# Patient Record
Sex: MALE | URBAN - METROPOLITAN AREA
[De-identification: names, ages, dates, MRNs, and addresses within clinical notes are randomized per-mention and may not be internally consistent; named-entity substitution may affect disease eponyms.]

---

## 2017-11-09 ENCOUNTER — OFFICE (OUTPATIENT)
Dept: URBAN - METROPOLITAN AREA CLINIC 66 | Facility: CLINIC | Age: 77
End: 2017-11-09

## 2017-11-09 VITALS — SYSTOLIC BLOOD PRESSURE: 156 MMHG | HEIGHT: 66 IN | DIASTOLIC BLOOD PRESSURE: 92 MMHG | WEIGHT: 138 LBS

## 2017-11-09 DIAGNOSIS — R17 JAUNDICE: ICD-10-CM

## 2017-11-09 DIAGNOSIS — K80.10 CALCULUS OF GALLBLADDER WITH CHOLECYSTITIS: ICD-10-CM

## 2017-11-09 DIAGNOSIS — K83.1 MIRIZZI'S SYNDROME: ICD-10-CM

## 2017-11-09 PROCEDURE — 99204 OFFICE O/P NEW MOD 45 MIN: CPT | Performed by: INTERNAL MEDICINE

## 2017-11-09 NOTE — SERVICEHPINOTES
patient was seen in hospital with abdominal pain, cholecystitis jaundice due Mirrizi syndrome. S/p ERCP with stent placement and bilirubin came down from 5.5. Now no more symptoms.  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 One 10 Fr by 9 cm plastic stent with a single external flap and a single internal flap was placed into the common bile duct into the common hepatic duct. Bile flowed through the stent. The stent was in good position.s/p lap cholecystectomy 10/27/17 per Dr. villavicencio.BRDIAGNOSIS:BRGALLBLADDER, LAPAROSCOPIC CHOLECYSTECTOMY:BR -     ACUTE AND CHRONIC CHOLECYSTITIS WITH MUCOSALBR     ULCERATION AND NECROSIS.BR-     CHOLELITHIASIS.

## 2017-12-09 ENCOUNTER — HOSPITAL ENCOUNTER (INPATIENT)
Facility: HOSPITAL | Age: 77
LOS: 1 days | DRG: 871 | End: 2017-12-10
Attending: EMERGENCY MEDICINE | Admitting: FAMILY MEDICINE
Payer: COMMERCIAL

## 2017-12-09 ENCOUNTER — APPOINTMENT (EMERGENCY)
Dept: RADIOLOGY | Facility: HOSPITAL | Age: 77
DRG: 871 | End: 2017-12-09
Payer: COMMERCIAL

## 2017-12-09 DIAGNOSIS — A41.9 SEPSIS (HCC): Primary | ICD-10-CM

## 2017-12-09 DIAGNOSIS — K76.9 HEPATIC LESION: ICD-10-CM

## 2017-12-09 DIAGNOSIS — A41.9 SEVERE SEPSIS (HCC): ICD-10-CM

## 2017-12-09 DIAGNOSIS — K75.0 HEPATIC ABSCESS: ICD-10-CM

## 2017-12-09 DIAGNOSIS — R65.20 SEVERE SEPSIS (HCC): ICD-10-CM

## 2017-12-09 PROCEDURE — 85730 THROMBOPLASTIN TIME PARTIAL: CPT | Performed by: EMERGENCY MEDICINE

## 2017-12-09 PROCEDURE — 85027 COMPLETE CBC AUTOMATED: CPT | Performed by: EMERGENCY MEDICINE

## 2017-12-09 PROCEDURE — 83605 ASSAY OF LACTIC ACID: CPT | Performed by: EMERGENCY MEDICINE

## 2017-12-09 PROCEDURE — 85007 BL SMEAR W/DIFF WBC COUNT: CPT | Performed by: EMERGENCY MEDICINE

## 2017-12-09 PROCEDURE — 85610 PROTHROMBIN TIME: CPT | Performed by: EMERGENCY MEDICINE

## 2017-12-09 PROCEDURE — 84484 ASSAY OF TROPONIN QUANT: CPT | Performed by: EMERGENCY MEDICINE

## 2017-12-09 PROCEDURE — 83735 ASSAY OF MAGNESIUM: CPT | Performed by: EMERGENCY MEDICINE

## 2017-12-09 PROCEDURE — 87040 BLOOD CULTURE FOR BACTERIA: CPT | Performed by: EMERGENCY MEDICINE

## 2017-12-09 PROCEDURE — 71020 HB CHEST X-RAY 2VW FRONTAL&LATL: CPT

## 2017-12-09 PROCEDURE — 80053 COMPREHEN METABOLIC PANEL: CPT | Performed by: EMERGENCY MEDICINE

## 2017-12-09 PROCEDURE — 85379 FIBRIN DEGRADATION QUANT: CPT | Performed by: EMERGENCY MEDICINE

## 2017-12-09 PROCEDURE — 93005 ELECTROCARDIOGRAM TRACING: CPT | Performed by: EMERGENCY MEDICINE

## 2017-12-09 PROCEDURE — 83690 ASSAY OF LIPASE: CPT | Performed by: EMERGENCY MEDICINE

## 2017-12-09 PROCEDURE — 36415 COLL VENOUS BLD VENIPUNCTURE: CPT | Performed by: EMERGENCY MEDICINE

## 2017-12-09 RX ORDER — 0.9 % SODIUM CHLORIDE 0.9 %
3 VIAL (ML) INJECTION AS NEEDED
Status: DISCONTINUED | OUTPATIENT
Start: 2017-12-09 | End: 2017-12-10 | Stop reason: HOSPADM

## 2017-12-10 ENCOUNTER — APPOINTMENT (INPATIENT)
Dept: MRI IMAGING | Facility: HOSPITAL | Age: 77
DRG: 871 | End: 2017-12-10
Payer: COMMERCIAL

## 2017-12-10 ENCOUNTER — APPOINTMENT (EMERGENCY)
Dept: CT IMAGING | Facility: HOSPITAL | Age: 77
DRG: 871 | End: 2017-12-10
Payer: COMMERCIAL

## 2017-12-10 ENCOUNTER — HOSPITAL ENCOUNTER (INPATIENT)
Facility: HOSPITAL | Age: 77
LOS: 12 days | Discharge: HOME | DRG: 919 | End: 2017-12-22
Attending: ANESTHESIOLOGY | Admitting: ANESTHESIOLOGY
Payer: COMMERCIAL

## 2017-12-10 ENCOUNTER — APPOINTMENT (INPATIENT)
Dept: RADIOLOGY | Facility: HOSPITAL | Age: 77
DRG: 919 | End: 2017-12-10
Payer: COMMERCIAL

## 2017-12-10 VITALS
WEIGHT: 147.71 LBS | HEART RATE: 126 BPM | OXYGEN SATURATION: 91 % | BODY MASS INDEX: 23.18 KG/M2 | DIASTOLIC BLOOD PRESSURE: 50 MMHG | HEIGHT: 67 IN | TEMPERATURE: 101.8 F | SYSTOLIC BLOOD PRESSURE: 105 MMHG | RESPIRATION RATE: 18 BRPM

## 2017-12-10 DIAGNOSIS — K80.21 CHOLELITHIASIS WITH BILIARY OBSTRUCTION: ICD-10-CM

## 2017-12-10 DIAGNOSIS — A41.9 SEVERE SEPSIS (HCC): ICD-10-CM

## 2017-12-10 DIAGNOSIS — E11.9 DM (DIABETES MELLITUS) (HCC): Primary | Chronic | ICD-10-CM

## 2017-12-10 DIAGNOSIS — E63.9 POOR NUTRITION: ICD-10-CM

## 2017-12-10 DIAGNOSIS — R13.10 DYSPHAGIA: ICD-10-CM

## 2017-12-10 DIAGNOSIS — R65.20 SEVERE SEPSIS (HCC): ICD-10-CM

## 2017-12-10 DIAGNOSIS — K76.9 HEPATIC LESION: ICD-10-CM

## 2017-12-10 PROBLEM — D72.825 BANDEMIA: Status: ACTIVE | Noted: 2017-12-10

## 2017-12-10 PROBLEM — D64.9 ANEMIA: Status: ACTIVE | Noted: 2017-12-10

## 2017-12-10 PROBLEM — N40.0 BPH (BENIGN PROSTATIC HYPERPLASIA): Status: ACTIVE | Noted: 2017-12-10

## 2017-12-10 PROBLEM — I10 HTN (HYPERTENSION): Chronic | Status: ACTIVE | Noted: 2017-12-10

## 2017-12-10 PROBLEM — N17.9 AKI (ACUTE KIDNEY INJURY) (HCC): Status: ACTIVE | Noted: 2017-12-10

## 2017-12-10 PROBLEM — E11.65 TYPE 2 DIABETES MELLITUS WITH HYPERGLYCEMIA (HCC): Status: ACTIVE | Noted: 2017-12-10

## 2017-12-10 PROBLEM — E03.9 HYPOTHYROIDISM: Status: ACTIVE | Noted: 2017-12-10

## 2017-12-10 PROBLEM — K65.1: Status: ACTIVE | Noted: 2017-12-10

## 2017-12-10 PROBLEM — E87.2 LACTIC ACID ACIDOSIS: Status: ACTIVE | Noted: 2017-12-10

## 2017-12-10 LAB
ABO GROUP BLD: NORMAL
ALBUMIN SERPL BCP-MCNC: 3 G/DL (ref 3.5–5)
ALP SERPL-CCNC: 72 U/L (ref 46–116)
ALT SERPL W P-5'-P-CCNC: 73 U/L (ref 12–78)
ANION GAP SERPL CALCULATED.3IONS-SCNC: 11 MMOL/L (ref 4–13)
ANION GAP SERPL CALCULATED.3IONS-SCNC: 8 MMOL/L (ref 4–13)
APTT PPP: 41 SECONDS (ref 23–35)
AST SERPL W P-5'-P-CCNC: 64 U/L (ref 5–45)
ATRIAL RATE: 103 BPM
BACTERIA UR QL AUTO: ABNORMAL /HPF
BASE EX.OXY STD BLDV CALC-SCNC: 90.5 % (ref 60–80)
BASE EXCESS BLDV CALC-SCNC: -1.2 MMOL/L
BASOPHILS # BLD AUTO: 0.01 THOUSANDS/ΜL (ref 0–0.1)
BASOPHILS # BLD MANUAL: 0 THOUSAND/UL (ref 0–0.1)
BASOPHILS NFR BLD AUTO: 0 % (ref 0–1)
BASOPHILS NFR MAR MANUAL: 0 % (ref 0–1)
BILIRUB SERPL-MCNC: 1.2 MG/DL (ref 0.2–1)
BILIRUB UR QL STRIP: NEGATIVE
BLD GP AB SCN SERPL QL: NEGATIVE
BUN SERPL-MCNC: 23 MG/DL (ref 5–25)
BUN SERPL-MCNC: 26 MG/DL (ref 5–25)
CALCIUM SERPL-MCNC: 7.8 MG/DL (ref 8.3–10.1)
CALCIUM SERPL-MCNC: 8.3 MG/DL (ref 8.3–10.1)
CHLORIDE SERPL-SCNC: 101 MMOL/L (ref 100–108)
CHLORIDE SERPL-SCNC: 99 MMOL/L (ref 100–108)
CLARITY UR: CLEAR
CO2 SERPL-SCNC: 24 MMOL/L (ref 21–32)
CO2 SERPL-SCNC: 25 MMOL/L (ref 21–32)
COLOR UR: YELLOW
CREAT SERPL-MCNC: 1.29 MG/DL (ref 0.6–1.3)
CREAT SERPL-MCNC: 1.45 MG/DL (ref 0.6–1.3)
DEPRECATED D DIMER PPP: 1440 NG/ML (FEU) (ref 0–424)
EOSINOPHIL # BLD AUTO: 0.01 THOUSAND/ΜL (ref 0–0.61)
EOSINOPHIL # BLD MANUAL: 0 THOUSAND/UL (ref 0–0.4)
EOSINOPHIL NFR BLD AUTO: 0 % (ref 0–6)
EOSINOPHIL NFR BLD MANUAL: 0 % (ref 0–6)
ERYTHROCYTE [DISTWIDTH] IN BLOOD BY AUTOMATED COUNT: 14.2 % (ref 11.6–15.1)
ERYTHROCYTE [DISTWIDTH] IN BLOOD BY AUTOMATED COUNT: 14.3 % (ref 11.6–15.1)
ERYTHROCYTE [DISTWIDTH] IN BLOOD BY AUTOMATED COUNT: 14.5 % (ref 11.6–15.1)
GFR SERPL CREATININE-BSD FRML MDRD: 46 ML/MIN/1.73SQ M
GFR SERPL CREATININE-BSD FRML MDRD: 53 ML/MIN/1.73SQ M
GLUCOSE SERPL-MCNC: 106 MG/DL (ref 65–140)
GLUCOSE SERPL-MCNC: 146 MG/DL (ref 65–140)
GLUCOSE SERPL-MCNC: 157 MG/DL (ref 65–140)
GLUCOSE SERPL-MCNC: 168 MG/DL (ref 65–140)
GLUCOSE SERPL-MCNC: 89 MG/DL (ref 65–140)
GLUCOSE UR STRIP-MCNC: NEGATIVE MG/DL
HCO3 BLDV-SCNC: 23.2 MMOL/L (ref 24–30)
HCT VFR BLD AUTO: 30.3 % (ref 36.5–49.3)
HCT VFR BLD AUTO: 31 % (ref 36.5–49.3)
HCT VFR BLD AUTO: 34 % (ref 36.5–49.3)
HGB BLD-MCNC: 10 G/DL (ref 12–17)
HGB BLD-MCNC: 10.1 G/DL (ref 12–17)
HGB BLD-MCNC: 11 G/DL (ref 12–17)
HGB UR QL STRIP.AUTO: ABNORMAL
INR PPP: 1.34 (ref 0.86–1.16)
KETONES UR STRIP-MCNC: NEGATIVE MG/DL
LACTATE SERPL-SCNC: 1 MMOL/L (ref 0.5–2)
LACTATE SERPL-SCNC: 1.8 MMOL/L (ref 0.5–2)
LACTATE SERPL-SCNC: 2.3 MMOL/L (ref 0.5–2)
LACTATE SERPL-SCNC: 4.4 MMOL/L (ref 0.5–2)
LEUKOCYTE ESTERASE UR QL STRIP: NEGATIVE
LIPASE SERPL-CCNC: 98 U/L (ref 73–393)
LYMPHOCYTES # BLD AUTO: 0.45 THOUSAND/UL (ref 0.6–4.47)
LYMPHOCYTES # BLD AUTO: 1.29 THOUSANDS/ΜL (ref 0.6–4.47)
LYMPHOCYTES # BLD AUTO: 2 % (ref 14–44)
LYMPHOCYTES NFR BLD AUTO: 6 % (ref 14–44)
MAGNESIUM SERPL-MCNC: 1.5 MG/DL (ref 1.6–2.6)
MCH RBC QN AUTO: 26.7 PG (ref 26.8–34.3)
MCH RBC QN AUTO: 27 PG (ref 26.8–34.3)
MCH RBC QN AUTO: 27.2 PG (ref 26.8–34.3)
MCHC RBC AUTO-ENTMCNC: 32.4 G/DL (ref 31.4–37.4)
MCHC RBC AUTO-ENTMCNC: 32.6 G/DL (ref 31.4–37.4)
MCHC RBC AUTO-ENTMCNC: 33 G/DL (ref 31.4–37.4)
MCV RBC AUTO: 82 FL (ref 82–98)
MCV RBC AUTO: 83 FL (ref 82–98)
MCV RBC AUTO: 83 FL (ref 82–98)
MONOCYTES # BLD AUTO: 1.05 THOUSAND/ΜL (ref 0.17–1.22)
MONOCYTES # BLD AUTO: 1.58 THOUSAND/UL (ref 0–1.22)
MONOCYTES NFR BLD AUTO: 5 % (ref 4–12)
MONOCYTES NFR BLD: 7 % (ref 4–12)
NEUTROPHILS # BLD AUTO: 18.51 THOUSANDS/ΜL (ref 1.85–7.62)
NEUTROPHILS # BLD MANUAL: 20.36 THOUSAND/UL (ref 1.85–7.62)
NEUTS BAND NFR BLD MANUAL: 9 % (ref 0–8)
NEUTS SEG NFR BLD AUTO: 81 % (ref 43–75)
NEUTS SEG NFR BLD AUTO: 89 % (ref 43–75)
NITRITE UR QL STRIP: NEGATIVE
NON-SQ EPI CELLS URNS QL MICRO: ABNORMAL /HPF
NRBC BLD AUTO-RTO: 0 /100 WBCS
NRBC BLD AUTO-RTO: 0 /100 WBCS
O2 CT BLDV-SCNC: 13.9 ML/DL
P AXIS: 55 DEGREES
PCO2 BLDV: 37.3 MM HG (ref 42–50)
PH BLDV: 7.41 [PH] (ref 7.3–7.4)
PH UR STRIP.AUTO: 5.5 [PH] (ref 4.5–8)
PLATELET # BLD AUTO: 135 THOUSANDS/UL (ref 149–390)
PLATELET # BLD AUTO: 164 THOUSANDS/UL (ref 149–390)
PLATELET # BLD AUTO: 169 THOUSANDS/UL (ref 149–390)
PLATELET BLD QL SMEAR: ADEQUATE
PMV BLD AUTO: 11.1 FL (ref 8.9–12.7)
PMV BLD AUTO: 11.2 FL (ref 8.9–12.7)
PMV BLD AUTO: 11.8 FL (ref 8.9–12.7)
PO2 BLDV: 60.4 MM HG (ref 35–45)
POTASSIUM SERPL-SCNC: 3.5 MMOL/L (ref 3.5–5.3)
POTASSIUM SERPL-SCNC: 3.7 MMOL/L (ref 3.5–5.3)
PR INTERVAL: 128 MS
PROT SERPL-MCNC: 6.6 G/DL (ref 6.4–8.2)
PROT UR STRIP-MCNC: ABNORMAL MG/DL
PROTHROMBIN TIME: 17 SECONDS (ref 12.1–14.4)
QRS AXIS: 68 DEGREES
QRSD INTERVAL: 112 MS
QT INTERVAL: 374 MS
QTC INTERVAL: 489 MS
RBC # BLD AUTO: 3.68 MILLION/UL (ref 3.88–5.62)
RBC # BLD AUTO: 3.74 MILLION/UL (ref 3.88–5.62)
RBC # BLD AUTO: 4.12 MILLION/UL (ref 3.88–5.62)
RBC #/AREA URNS AUTO: ABNORMAL /HPF
RH BLD: POSITIVE
SODIUM SERPL-SCNC: 134 MMOL/L (ref 136–145)
SODIUM SERPL-SCNC: 134 MMOL/L (ref 136–145)
SP GR UR STRIP.AUTO: 1.01 (ref 1–1.03)
SPECIMEN EXPIRATION DATE: NORMAL
T WAVE AXIS: 102 DEGREES
TOTAL CELLS COUNTED SPEC: 100
TROPONIN I SERPL-MCNC: <0.02 NG/ML
UROBILINOGEN UR QL STRIP.AUTO: 0.2 E.U./DL
VARIANT LYMPHS # BLD AUTO: 1 %
VENTRICULAR RATE: 103 BPM
WBC # BLD AUTO: 20.93 THOUSAND/UL (ref 4.31–10.16)
WBC # BLD AUTO: 21.27 THOUSAND/UL (ref 4.31–10.16)
WBC # BLD AUTO: 22.62 THOUSAND/UL (ref 4.31–10.16)
WBC #/AREA URNS AUTO: ABNORMAL /HPF

## 2017-12-10 PROCEDURE — 86901 BLOOD TYPING SEROLOGIC RH(D): CPT | Performed by: EMERGENCY MEDICINE

## 2017-12-10 PROCEDURE — 83605 ASSAY OF LACTIC ACID: CPT | Performed by: PHYSICIAN ASSISTANT

## 2017-12-10 PROCEDURE — 77012 CT SCAN FOR NEEDLE BIOPSY: CPT

## 2017-12-10 PROCEDURE — 80048 BASIC METABOLIC PNL TOTAL CA: CPT | Performed by: EMERGENCY MEDICINE

## 2017-12-10 PROCEDURE — C1769 GUIDE WIRE: HCPCS

## 2017-12-10 PROCEDURE — 36415 COLL VENOUS BLD VENIPUNCTURE: CPT | Performed by: EMERGENCY MEDICINE

## 2017-12-10 PROCEDURE — 99152 MOD SED SAME PHYS/QHP 5/>YRS: CPT

## 2017-12-10 PROCEDURE — 99285 EMERGENCY DEPT VISIT HI MDM: CPT

## 2017-12-10 PROCEDURE — 86850 RBC ANTIBODY SCREEN: CPT | Performed by: EMERGENCY MEDICINE

## 2017-12-10 PROCEDURE — 86900 BLOOD TYPING SEROLOGIC ABO: CPT | Performed by: EMERGENCY MEDICINE

## 2017-12-10 PROCEDURE — 87205 SMEAR GRAM STAIN: CPT | Performed by: ANESTHESIOLOGY

## 2017-12-10 PROCEDURE — 81001 URINALYSIS AUTO W/SCOPE: CPT | Performed by: EMERGENCY MEDICINE

## 2017-12-10 PROCEDURE — 85027 COMPLETE CBC AUTOMATED: CPT | Performed by: PHYSICIAN ASSISTANT

## 2017-12-10 PROCEDURE — 85025 COMPLETE CBC W/AUTO DIFF WBC: CPT | Performed by: EMERGENCY MEDICINE

## 2017-12-10 PROCEDURE — A9585 GADOBUTROL INJECTION: HCPCS | Performed by: INTERNAL MEDICINE

## 2017-12-10 PROCEDURE — 87040 BLOOD CULTURE FOR BACTERIA: CPT | Performed by: INTERNAL MEDICINE

## 2017-12-10 PROCEDURE — 87070 CULTURE OTHR SPECIMN AEROBIC: CPT | Performed by: ANESTHESIOLOGY

## 2017-12-10 PROCEDURE — 82948 REAGENT STRIP/BLOOD GLUCOSE: CPT

## 2017-12-10 PROCEDURE — 96375 TX/PRO/DX INJ NEW DRUG ADDON: CPT

## 2017-12-10 PROCEDURE — 82805 BLOOD GASES W/O2 SATURATION: CPT | Performed by: EMERGENCY MEDICINE

## 2017-12-10 PROCEDURE — 96361 HYDRATE IV INFUSION ADD-ON: CPT

## 2017-12-10 PROCEDURE — 83605 ASSAY OF LACTIC ACID: CPT | Performed by: EMERGENCY MEDICINE

## 2017-12-10 PROCEDURE — 88313 SPECIAL STAINS GROUP 2: CPT | Performed by: ANESTHESIOLOGY

## 2017-12-10 PROCEDURE — 80048 BASIC METABOLIC PNL TOTAL CA: CPT | Performed by: PHYSICIAN ASSISTANT

## 2017-12-10 PROCEDURE — 0FB13ZX EXCISION OF RIGHT LOBE LIVER, PERCUTANEOUS APPROACH, DIAGNOSTIC: ICD-10-PCS | Performed by: RADIOLOGY

## 2017-12-10 PROCEDURE — 88307 TISSUE EXAM BY PATHOLOGIST: CPT | Performed by: ANESTHESIOLOGY

## 2017-12-10 PROCEDURE — 83605 ASSAY OF LACTIC ACID: CPT | Performed by: INTERNAL MEDICINE

## 2017-12-10 PROCEDURE — 47000 NEEDLE BIOPSY OF LIVER PERQ: CPT

## 2017-12-10 PROCEDURE — 96365 THER/PROPH/DIAG IV INF INIT: CPT

## 2017-12-10 PROCEDURE — 74183 MRI ABD W/O CNTR FLWD CNTR: CPT

## 2017-12-10 PROCEDURE — 74177 CT ABD & PELVIS W/CONTRAST: CPT

## 2017-12-10 RX ORDER — KETOROLAC TROMETHAMINE 30 MG/ML
30 INJECTION, SOLUTION INTRAMUSCULAR; INTRAVENOUS ONCE
Status: COMPLETED | OUTPATIENT
Start: 2017-12-10 | End: 2017-12-10

## 2017-12-10 RX ORDER — CALCIUM CARBONATE 200(500)MG
1000 TABLET,CHEWABLE ORAL DAILY PRN
Status: DISCONTINUED | OUTPATIENT
Start: 2017-12-10 | End: 2017-12-22 | Stop reason: HOSPADM

## 2017-12-10 RX ORDER — TAMSULOSIN HYDROCHLORIDE 0.4 MG/1
0.4 CAPSULE ORAL
Status: CANCELLED | OUTPATIENT
Start: 2017-12-10

## 2017-12-10 RX ORDER — VANCOMYCIN HYDROCHLORIDE 1 G/200ML
15 INJECTION, SOLUTION INTRAVENOUS ONCE
Status: COMPLETED | OUTPATIENT
Start: 2017-12-10 | End: 2017-12-10

## 2017-12-10 RX ORDER — SODIUM CHLORIDE 9 MG/ML
100 INJECTION, SOLUTION INTRAVENOUS CONTINUOUS
Status: DISCONTINUED | OUTPATIENT
Start: 2017-12-10 | End: 2017-12-11

## 2017-12-10 RX ORDER — LEVOTHYROXINE SODIUM 0.05 MG/1
50 TABLET ORAL
Status: DISCONTINUED | OUTPATIENT
Start: 2017-12-10 | End: 2017-12-10 | Stop reason: HOSPADM

## 2017-12-10 RX ORDER — LEVOTHYROXINE SODIUM 0.05 MG/1
50 TABLET ORAL DAILY
COMMUNITY

## 2017-12-10 RX ORDER — CALCIUM CARBONATE 200(500)MG
1000 TABLET,CHEWABLE ORAL DAILY PRN
Status: CANCELLED | OUTPATIENT
Start: 2017-12-10

## 2017-12-10 RX ORDER — VANCOMYCIN HYDROCHLORIDE 1 G/200ML
15 INJECTION, SOLUTION INTRAVENOUS EVERY 24 HOURS
Status: CANCELLED | OUTPATIENT
Start: 2017-12-10

## 2017-12-10 RX ORDER — SODIUM CHLORIDE 9 MG/ML
100 INJECTION, SOLUTION INTRAVENOUS CONTINUOUS
Status: CANCELLED | OUTPATIENT
Start: 2017-12-10

## 2017-12-10 RX ORDER — HEPARIN SODIUM 5000 [USP'U]/ML
5000 INJECTION, SOLUTION INTRAVENOUS; SUBCUTANEOUS EVERY 8 HOURS SCHEDULED
Status: CANCELLED | OUTPATIENT
Start: 2017-12-10

## 2017-12-10 RX ORDER — DUTASTERIDE AND TAMSULOSIN HYDROCHLORIDE CAPSULES .5; .4 MG/1; MG/1
CAPSULE ORAL
COMMUNITY

## 2017-12-10 RX ORDER — HEPARIN SODIUM 5000 [USP'U]/ML
5000 INJECTION, SOLUTION INTRAVENOUS; SUBCUTANEOUS EVERY 8 HOURS SCHEDULED
Status: DISCONTINUED | OUTPATIENT
Start: 2017-12-10 | End: 2017-12-10

## 2017-12-10 RX ORDER — MIDAZOLAM HYDROCHLORIDE 1 MG/ML
INJECTION INTRAMUSCULAR; INTRAVENOUS CODE/TRAUMA/SEDATION MEDICATION
Status: COMPLETED | OUTPATIENT
Start: 2017-12-10 | End: 2017-12-10

## 2017-12-10 RX ORDER — 0.9 % SODIUM CHLORIDE 0.9 %
3 VIAL (ML) INJECTION AS NEEDED
Status: DISCONTINUED | OUTPATIENT
Start: 2017-12-10 | End: 2017-12-22 | Stop reason: HOSPADM

## 2017-12-10 RX ORDER — CALCIUM CARBONATE 200(500)MG
1000 TABLET,CHEWABLE ORAL DAILY PRN
Status: DISCONTINUED | OUTPATIENT
Start: 2017-12-10 | End: 2017-12-10 | Stop reason: HOSPADM

## 2017-12-10 RX ORDER — VANCOMYCIN HYDROCHLORIDE 1 G/200ML
15 INJECTION, SOLUTION INTRAVENOUS ONCE
Status: CANCELLED | OUTPATIENT
Start: 2017-12-10 | End: 2017-12-10

## 2017-12-10 RX ORDER — ACETAMINOPHEN 325 MG/1
650 TABLET ORAL EVERY 6 HOURS PRN
Status: CANCELLED | OUTPATIENT
Start: 2017-12-10

## 2017-12-10 RX ORDER — ONDANSETRON 2 MG/ML
4 INJECTION INTRAMUSCULAR; INTRAVENOUS EVERY 6 HOURS PRN
Status: DISCONTINUED | OUTPATIENT
Start: 2017-12-10 | End: 2017-12-10 | Stop reason: HOSPADM

## 2017-12-10 RX ORDER — ACETAMINOPHEN 325 MG/1
650 TABLET ORAL EVERY 6 HOURS PRN
Status: DISCONTINUED | OUTPATIENT
Start: 2017-12-10 | End: 2017-12-10 | Stop reason: HOSPADM

## 2017-12-10 RX ORDER — 0.9 % SODIUM CHLORIDE 0.9 %
3 VIAL (ML) INJECTION AS NEEDED
Status: CANCELLED | OUTPATIENT
Start: 2017-12-10

## 2017-12-10 RX ORDER — ONDANSETRON 2 MG/ML
4 INJECTION INTRAMUSCULAR; INTRAVENOUS EVERY 6 HOURS PRN
Status: CANCELLED | OUTPATIENT
Start: 2017-12-10

## 2017-12-10 RX ORDER — ONDANSETRON 2 MG/ML
4 INJECTION INTRAMUSCULAR; INTRAVENOUS EVERY 6 HOURS PRN
Status: DISCONTINUED | OUTPATIENT
Start: 2017-12-10 | End: 2017-12-22 | Stop reason: HOSPADM

## 2017-12-10 RX ORDER — ACETAMINOPHEN 325 MG/1
650 TABLET ORAL ONCE
Status: COMPLETED | OUTPATIENT
Start: 2017-12-10 | End: 2017-12-10

## 2017-12-10 RX ORDER — LEVOTHYROXINE SODIUM 0.05 MG/1
50 TABLET ORAL
Status: CANCELLED | OUTPATIENT
Start: 2017-12-11

## 2017-12-10 RX ORDER — CHLORHEXIDINE GLUCONATE 0.12 MG/ML
15 RINSE ORAL EVERY 12 HOURS SCHEDULED
Status: DISCONTINUED | OUTPATIENT
Start: 2017-12-10 | End: 2017-12-11

## 2017-12-10 RX ORDER — TAMSULOSIN HYDROCHLORIDE 0.4 MG/1
0.4 CAPSULE ORAL
Status: DISCONTINUED | OUTPATIENT
Start: 2017-12-10 | End: 2017-12-22 | Stop reason: HOSPADM

## 2017-12-10 RX ORDER — SODIUM CHLORIDE 9 MG/ML
100 INJECTION, SOLUTION INTRAVENOUS CONTINUOUS
Status: DISCONTINUED | OUTPATIENT
Start: 2017-12-10 | End: 2017-12-10 | Stop reason: HOSPADM

## 2017-12-10 RX ORDER — TAMSULOSIN HYDROCHLORIDE 0.4 MG/1
0.4 CAPSULE ORAL
Status: DISCONTINUED | OUTPATIENT
Start: 2017-12-10 | End: 2017-12-10 | Stop reason: HOSPADM

## 2017-12-10 RX ORDER — LISINOPRIL 10 MG/1
10 TABLET ORAL 2 TIMES DAILY
COMMUNITY

## 2017-12-10 RX ORDER — VANCOMYCIN HYDROCHLORIDE 1 G/200ML
15 INJECTION, SOLUTION INTRAVENOUS EVERY 12 HOURS
Status: DISCONTINUED | OUTPATIENT
Start: 2017-12-10 | End: 2017-12-10 | Stop reason: DRUGHIGH

## 2017-12-10 RX ORDER — HEPARIN SODIUM 5000 [USP'U]/ML
5000 INJECTION, SOLUTION INTRAVENOUS; SUBCUTANEOUS EVERY 8 HOURS SCHEDULED
Status: DISCONTINUED | OUTPATIENT
Start: 2017-12-10 | End: 2017-12-10 | Stop reason: HOSPADM

## 2017-12-10 RX ORDER — VANCOMYCIN HYDROCHLORIDE 1 G/200ML
15 INJECTION, SOLUTION INTRAVENOUS EVERY 24 HOURS
Status: DISCONTINUED | OUTPATIENT
Start: 2017-12-11 | End: 2017-12-11

## 2017-12-10 RX ORDER — LEVOTHYROXINE SODIUM 0.05 MG/1
50 TABLET ORAL
Status: DISCONTINUED | OUTPATIENT
Start: 2017-12-11 | End: 2017-12-22 | Stop reason: HOSPADM

## 2017-12-10 RX ORDER — VANCOMYCIN HYDROCHLORIDE 1 G/200ML
15 INJECTION, SOLUTION INTRAVENOUS EVERY 24 HOURS
Status: DISCONTINUED | OUTPATIENT
Start: 2017-12-10 | End: 2017-12-10 | Stop reason: HOSPADM

## 2017-12-10 RX ORDER — ACETAMINOPHEN 325 MG/1
650 TABLET ORAL EVERY 6 HOURS PRN
Status: DISCONTINUED | OUTPATIENT
Start: 2017-12-10 | End: 2017-12-22 | Stop reason: HOSPADM

## 2017-12-10 RX ORDER — AMOXICILLIN 250 MG
1 CAPSULE ORAL DAILY PRN
Status: DISCONTINUED | OUTPATIENT
Start: 2017-12-10 | End: 2017-12-22 | Stop reason: HOSPADM

## 2017-12-10 RX ORDER — FENTANYL CITRATE 50 UG/ML
INJECTION, SOLUTION INTRAMUSCULAR; INTRAVENOUS CODE/TRAUMA/SEDATION MEDICATION
Status: COMPLETED | OUTPATIENT
Start: 2017-12-10 | End: 2017-12-10

## 2017-12-10 RX ADMIN — SODIUM CHLORIDE 1000 ML: 0.9 INJECTION, SOLUTION INTRAVENOUS at 00:08

## 2017-12-10 RX ADMIN — SODIUM CHLORIDE 1000 ML: 0.9 INJECTION, SOLUTION INTRAVENOUS at 11:45

## 2017-12-10 RX ADMIN — PHYTONADIONE 10 MG: 10 INJECTION, EMULSION INTRAMUSCULAR; INTRAVENOUS; SUBCUTANEOUS at 17:11

## 2017-12-10 RX ADMIN — HEPARIN SODIUM 5000 UNITS: 5000 INJECTION, SOLUTION INTRAVENOUS; SUBCUTANEOUS at 06:42

## 2017-12-10 RX ADMIN — SODIUM CHLORIDE 100 ML/HR: 0.9 INJECTION, SOLUTION INTRAVENOUS at 17:30

## 2017-12-10 RX ADMIN — MIDAZOLAM 1 MG: 1 INJECTION INTRAMUSCULAR; INTRAVENOUS at 19:44

## 2017-12-10 RX ADMIN — SODIUM CHLORIDE 75 ML/HR: 0.9 INJECTION, SOLUTION INTRAVENOUS at 03:04

## 2017-12-10 RX ADMIN — VANCOMYCIN HYDROCHLORIDE 1000 MG: 1 INJECTION, SOLUTION INTRAVENOUS at 01:45

## 2017-12-10 RX ADMIN — LEVOTHYROXINE SODIUM 50 MCG: 50 TABLET ORAL at 06:42

## 2017-12-10 RX ADMIN — GADOBUTROL 6 ML: 604.72 INJECTION INTRAVENOUS at 11:35

## 2017-12-10 RX ADMIN — SODIUM CHLORIDE 1000 ML: 0.9 INJECTION, SOLUTION INTRAVENOUS at 15:19

## 2017-12-10 RX ADMIN — Medication 3.38 G: at 16:17

## 2017-12-10 RX ADMIN — KETOROLAC TROMETHAMINE 30 MG: 30 INJECTION, SOLUTION INTRAMUSCULAR at 00:34

## 2017-12-10 RX ADMIN — VANCOMYCIN HYDROCHLORIDE 1000 MG: 1 INJECTION, SOLUTION INTRAVENOUS at 18:00

## 2017-12-10 RX ADMIN — IOHEXOL 100 ML: 350 INJECTION, SOLUTION INTRAVENOUS at 00:43

## 2017-12-10 RX ADMIN — CEFTRIAXONE 1000 MG: 1 INJECTION, SOLUTION INTRAVENOUS at 01:04

## 2017-12-10 RX ADMIN — Medication 3.38 G: at 23:34

## 2017-12-10 RX ADMIN — TAMSULOSIN HYDROCHLORIDE 0.4 MG: 0.4 CAPSULE ORAL at 20:58

## 2017-12-10 RX ADMIN — ACETAMINOPHEN 650 MG: 325 TABLET, FILM COATED ORAL at 21:12

## 2017-12-10 RX ADMIN — ACETAMINOPHEN 650 MG: 325 TABLET ORAL at 00:34

## 2017-12-10 RX ADMIN — FENTANYL CITRATE 50 MCG: 50 INJECTION INTRAMUSCULAR; INTRAVENOUS at 19:44

## 2017-12-10 NOTE — PLAN OF CARE
Problem: Potential for Falls  Goal: Patient will remain free of falls  INTERVENTIONS:  - Assess patient frequently for physical needs  -  Identify cognitive and physical deficits and behaviors that affect risk of falls    -  [place] fall precautions as indicated by assessment   - Educate patient/family on patient safety including physical limitations  - Instruct patient to call for assistance with activity based on assessment  - Modify environment to reduce risk of injury  - Consider OT/PT consult to assist with strengthening/mobility   Outcome: Progressing

## 2017-12-10 NOTE — ED PROVIDER NOTES
History  Chief Complaint   Patient presents with    Fever - 9 weeks to 74 years     Per EMS " Family stated he became shaky and felt warm to touch  He was showing confusion  He had gallbladder removed three weeks ago  He has stents in place and is to be remove on the 12/22  " Healthy appearance  Answers questions appropriately     79-year-old male patient, here visiting from [place] states that he started with shakiness and feeling unwell earlier this evening [place]  Three weeks ago, the patient underwent a cholecystectomy for cholecystitis  Since then the patient has been doing well  Today, while with family, he felt that he was very jittery  The patient has no other signs of infection but does have abdominal wall which is very warm to the touch  Patient be evaluated for sepsis  History provided by:  Patient   used: No    Fever - 9 weeks to 74 years   Temp source:  Oral  Severity:  Moderate  Onset quality:  Sudden  Timing:  Constant  Progression:  Worsening  Chronicity:  New  Relieved by:  Nothing  Worsened by:  Nothing  Ineffective treatments:  None tried  Associated symptoms: confusion    Associated symptoms: no chest pain, no chills, no cough, no diarrhea, no dysuria, no headaches, no myalgias, no nausea, no rhinorrhea and no somnolence        Prior to Admission Medications   Prescriptions Last Dose Informant Patient Reported? Taking?    Dutasteride-Tamsulosin HCl 0 5-0 4 MG CAPS 12/9/2017 at Unknown time  Yes Yes   Sig: Take by mouth daily at bedtime   levothyroxine 50 mcg tablet 12/9/2017 at Unknown time  Yes Yes   Sig: Take 50 mcg by mouth daily   lisinopril (ZESTRIL) 10 mg tablet 12/9/2017 at Unknown time  Yes Yes   Sig: Take 10 mg by mouth 2 (two) times a day   metFORMIN (GLUCOPHAGE) 500 mg tablet 12/9/2017 at Unknown time  Yes Yes   Sig: Take 500 mg by mouth daily      Facility-Administered Medications: None       Past Medical History: Diagnosis Date    BPH (benign prostatic hyperplasia)     Diabetes mellitus ([facility] [address] )     Hypertension     Hypothyroidism        Past Surgical History:   Procedure Laterality Date    APPENDECTOMY      CHOLECYSTECTOMY      biliary stent placement       Family History   Problem Relation Age of Onset    Hypertension Mother     Dementia Father     Diabetes Brother      I have reviewed and agree with the history as documented  Social History   Substance Use Topics    Smoking status: Former Smoker     Packs/day: 0 50     Years: 10 00     Types: Cigarettes     Start date: 1965     Quit date: 1975    Smokeless tobacco: Never Used    Alcohol use Yes      Comment: occ        Review of Systems   Constitutional: Positive for fever  Negative for chills  HENT: Negative for rhinorrhea  Respiratory: Negative for cough  Cardiovascular: Negative for chest pain  Gastrointestinal: Negative for diarrhea and nausea  Genitourinary: Negative for dysuria  Musculoskeletal: Negative for myalgias  Neurological: Negative for headaches  Psychiatric/Behavioral: Positive for confusion  All other systems reviewed and are negative  Physical Exam  ED Triage Vitals [12/09/17 2334]   Temperature Pulse Respirations Blood Pressure SpO2   99 2 °F (37 3 °C) (!) 118 20 110/57 98 %      Temp Source Heart Rate Source Patient Position - Orthostatic VS BP Location FiO2 (%)   Oral Monitor Sitting Right arm --      Pain Score       4           Orthostatic Vital Signs  Vitals:    12/10/17 0300 12/10/17 0730 12/10/17 0828 12/10/17 1038   BP: 109/55 100/60 139/68 105/50   Pulse: 81 77 104 (!) 126   Patient Position - Orthostatic VS: Lying Lying Lying Lying       Physical Exam   Constitutional: He is oriented to person, place, and time  He appears well-developed and well-nourished  No distress  HENT:   Head: Normocephalic and atraumatic     Right Ear: External ear normal    Left Ear: External ear normal    Eyes: Conjunctivae and EOM are normal  Right eye exhibits no discharge  Left eye exhibits no discharge  No scleral icterus  Neck: Normal range of motion  Neck supple  No JVD present  No tracheal deviation present  No thyromegaly present  Cardiovascular: Normal rate and regular rhythm  Pulmonary/Chest: Effort normal and breath sounds normal  No stridor  No respiratory distress  He has no wheezes  He has no rales  Abdominal: Soft  Bowel sounds are normal  He exhibits no distension  There is no tenderness  Musculoskeletal: Normal range of motion  He exhibits no edema, tenderness or deformity  Neurological: He is alert and oriented to person, place, and time  No cranial nerve deficit  Coordination normal    Skin: Skin is warm and dry  He is not diaphoretic  Psychiatric: He has a normal mood and affect  His behavior is normal    Nursing note and vitals reviewed  ED Medications  Medications   sodium chloride 0 9 % bolus 1,000 mL (0 mL Intravenous Stopped 12/10/17 0145)   acetaminophen (TYLENOL) tablet 650 mg (650 mg Oral Given 12/10/17 0034)   ketorolac (TORADOL) injection 30 mg (30 mg Intravenous Given 12/10/17 0034)   iohexol (OMNIPAQUE) 350 MG/ML injection (MULTI-DOSE) 100 mL (100 mL Intravenous Given 12/10/17 0043)   vancomycin (VANCOCIN) IVPB (premix) 1,000 mg (0 mg Intravenous Stopped 12/10/17 0250)   sodium chloride 0 9 % bolus 1,000 mL (1,000 mL Intravenous New Bag 12/10/17 1145)   gadobutrol injection (MULTI-DOSE) SOLN 10 mL (6 mL Intravenous Given 12/10/17 1135)       Diagnostic Studies  Results Reviewed     Procedure Component Value Units Date/Time    Blood culture [[id number]] Collected:  12/09/17 2354    Lab Status:  Preliminary result Specimen:  Blood from Arm, Right Updated:  12/12/17 1301     Blood Culture No Growth at 48 hrs  Blood culture [[id number]] Collected:  12/09/17 2354    Lab Status:  Preliminary result Specimen:  Blood from Hand, Right Updated:  12/12/17 1301     Blood Culture No Growth at 48 hrs  Basic metabolic panel [[id number]]  (Abnormal) Collected:  12/10/17 0509    Lab Status:  Final result Specimen:  Blood from Arm, Left Updated:  12/10/17 0556     Sodium 134 (L) mmol/L      Potassium 3 5 mmol/L      Chloride 101 mmol/L      CO2 25 mmol/L      Anion Gap 8 mmol/L      BUN 23 mg/dL      Creatinine 1 29 mg/dL      Glucose 168 (H) mg/dL      Calcium 7 8 (L) mg/dL      eGFR 53 ml/min/1 73sq m     Narrative:         National Kidney Disease Education Program recommendations are as follows:  GFR calculation is accurate only with a steady state creatinine  Chronic Kidney disease less than 60 ml/min/1 73 sq  meters  Kidney failure less than 15 ml/min/1 73 sq  meters  CBC (With Platelets) [[id number]]  (Abnormal) Collected:  12/10/17 0509    Lab Status:  Final result Specimen:  Blood from Arm, Left Updated:  12/10/17 0533     WBC 20 93 (H) Thousand/uL      RBC 3 74 (L) Million/uL      Hemoglobin 10 1 (L) g/dL      Hematocrit 31 0 (L) %      MCV 83 fL      MCH 27 0 pg      MCHC 32 6 g/dL      RDW 14 3 %      Platelets 816 Thousands/uL      MPV 11 8 fL     Lactic acid every 2 hours prn [[id number]]  (Normal) Collected:  12/10/17 0216    Lab Status:  Final result Specimen:  Blood from Arm, Right Updated:  12/10/17 0259     LACTIC ACID 1 0 mmol/L     Narrative:         Result may be elevated if tourniquet was used during collection      Blood gas, venous [[id number]]  (Abnormal) Collected:  12/10/17 0136    Lab Status:  Final result Specimen:  Blood from Arm, Right Updated:  12/10/17 0142     pH, Ozzie 7 411 (H)     pCO2, Ozzie 37 3 (L) mm Hg      pO2, Ozzie 60 4 (H) mm Hg      HCO3, Ozzie 23 2 (L) mmol/L      Base Excess, Ozzie -1 2 mmol/L      O2 Content, Ozzie 13 9 ml/dL      O2 HGB, VENOUS 90 5 (H) %     Urine Microscopic [[id number]]  (Abnormal) Collected:  12/10/17 0103    Lab Status:  Final result Specimen:  Urine from Urine, Clean Catch Updated:  12/10/17 0133     RBC, UA 1-2 (A) /hpf      WBC, UA None Seen /hpf Epithelial Cells Occasional /hpf      Bacteria, UA None Seen /hpf     Urinalysis with culture and sensitivity reflex [[id number]]  (Abnormal) Collected:  12/10/17 0103    Lab Status:  Final result Specimen:  Urine from Urine, Clean Catch Updated:  12/10/17 0123     Color, UA Yellow     Clarity, UA Clear     Specific Gravity, UA 1 010     pH, UA 5 5     Leukocytes, UA Negative     Nitrite, UA Negative     Protein, UA Trace (A) mg/dl      Glucose, UA Negative mg/dl      Ketones, UA Negative mg/dl      Urobilinogen, UA 0 2 E U /dl      Bilirubin, UA Negative     Blood, UA Small (A)    D-dimer, quantitative [[id number]]  (Abnormal) Collected:  12/09/17 2354    Lab Status:  Final result Specimen:  Blood from Arm, Right Updated:  12/10/17 0044     D-Dimer, Quant 1,440 (H) ng/ml (FEU)     CBC and differential [[id number]]  (Abnormal) Collected:  12/09/17 2354    Lab Status:  Final result Specimen:  Blood from Arm, Right Updated:  12/10/17 0037     WBC 22 62 (H) Thousand/uL      RBC 4 12 Million/uL      Hemoglobin 11 0 (L) g/dL      Hematocrit 34 0 (L) %      MCV 83 fL      MCH 26 7 (L) pg      MCHC 32 4 g/dL      RDW 14 2 %      MPV 11 1 fL      Platelets 045 Thousands/uL      nRBC 0 /100 WBCs     Narrative: This is an appended report  These results have been appended to a previously verified report  Lactate Blood [[id number]]  (Abnormal) Collected:  12/09/17 2354    Lab Status:  Final result Specimen:  Blood from Arm, Right Updated:  12/10/17 0032     LACTIC ACID 2 3 (HH) mmol/L     Narrative:         Result may be elevated if tourniquet was used during collection      Troponin I [[id number]]  (Normal) Collected:  12/09/17 2354    Lab Status:  Final result Specimen:  Blood from Arm, Right Updated:  12/10/17 0030     Troponin I <0 02 ng/mL     Narrative:         [employer] Chemistry analyzer 99% cutoff is > 0 04 ng/mL in network labs    o cTnI 99% cutoff is useful only when applied to patients in the clinical setting of myocardial ischemia  o cTnI 99% cutoff should be interpreted in the context of clinical history, ECG findings and possibly cardiac imaging to establish correct diagnosis  o cTnI 99% cutoff may be suggestive but clearly not indicative of a coronary event without the clinical setting of myocardial ischemia  Comprehensive metabolic panel [[id number]]  (Abnormal) Collected:  12/09/17 2354    Lab Status:  Final result Specimen:  Blood from Arm, Right Updated:  12/10/17 3528     Sodium 134 (L) mmol/L      Potassium 3 7 mmol/L      Chloride 99 (L) mmol/L      CO2 24 mmol/L      Anion Gap 11 mmol/L      BUN 26 (H) mg/dL      Creatinine 1 45 (H) mg/dL      Glucose 157 (H) mg/dL      Calcium 8 3 mg/dL      AST 64 (H) U/L      ALT 73 U/L      Alkaline Phosphatase 72 U/L      Total Protein 6 6 g/dL      Albumin 3 0 (L) g/dL      Total Bilirubin 1 20 (H) mg/dL      eGFR 46 ml/min/1 73sq m     Narrative:         National Kidney Disease Education Program recommendations are as follows:  GFR calculation is accurate only with a steady state creatinine  Chronic Kidney disease less than 60 ml/min/1 73 sq  meters  Kidney failure less than 15 ml/min/1 73 sq  meters  Lipase [[id number]]  (Normal) Collected:  12/09/17 2354    Lab Status:  Final result Specimen:  Blood from Arm, Right Updated:  12/10/17 0027     Lipase 98 u/L     Magnesium [[id number]]  (Abnormal) Collected:  12/09/17 2354    Lab Status:  Final result Specimen:  Blood from Arm, Right Updated:  12/10/17 0027     Magnesium 1 5 (L) mg/dL     Protime-INR [[id number]]  (Abnormal) Collected:  12/09/17 2354    Lab Status:  Final result Specimen:  Blood from Arm, Right Updated:  12/10/17 0027     Protime 17 0 (H) seconds      INR 1 34 (H)    APTT [[id number]]  (Abnormal) Collected:  12/09/17 2354    Lab Status:  Final result Specimen:  Blood from Arm, Right Updated:  12/10/17 0027     PTT 41 (H) seconds     Narrative:          Therapeutic Heparin Range = 60-90 seconds MRI abdomen w wo contrast   Final Result by Brii Scruggs DO (12/10 1313)      4 5 cm right hepatic lobe mass is indeterminate and restricts diffusion  Although abscess remains in the differential diagnosis, its imaging appearance slightly favor a solid mass  Needle sampling is necessary for further differentiation and therefore    recommended  1 7 cm posterior right hepatic lobe lesion is difficult to characterize given its small size although has imaging features favoring cavernous hemangioma  I would recommend follow-up MRI in 3 months to ensure stability of this finding  1 7 x 1 2 cm indeterminate left adrenal nodule  This does not appear to represent a lipid rich adenoma although could represent a lipid poor adenoma  This can be reevaluated for stability at time of follow-up above  I personally discussed this study and recommendations with Dr Margarita De La Torre on 12/10/2017 1:06 PM          Workstation performed: [id number]         XR chest 2 views   Final Result by Georgiana Jonas MD (12/10 1024)      No active pulmonary disease  Workstation performed: [id number]         CT abdomen pelvis with contrast   Final Result by Julieth Gan DO (12/10 7793)   1  Enhancing hepatic lesions as described above  Differential would include atypical hemangioma, hepatic neoplasm, either primary or metastatic or hepatic abscess, given the history of fever and recent gallbladder surgery  It is recommended the    patient have a follow-up MRI of the liver with gadolinium, for further evaluation  2   Prostatomegaly with probable bladder outlet obstruction  Correlation with serum PSA  3   Colonic diverticulosis        Findings are consistent with the preliminary report from [facility] which was provided shortly after completion of the exam                       Workstation performed: [id number]                    Procedures  Procedures       Phone Contacts  ED Phone Contact    ED Course  ED Course                                MDM  Number of Diagnoses or Management Options  Hepatic abscess: new and requires workup     Amount and/or Complexity of Data Reviewed  Clinical lab tests: ordered and reviewed  Tests in the radiology section of CPT®: ordered and reviewed  Decide to obtain previous medical records or to obtain history from someone other than the patient: yes  Review and summarize past medical records: yes    Patient Progress  Patient progress: stable    The patient presented with a condition in which there was a high probability of imminent or life-threatening deterioration, and critical care services (excluding separately billable procedures) totalled 30-74 minutes  Disposition  Final diagnoses:   Hepatic abscess     Time reflects when diagnosis was documented in both MDM as applicable and the Disposition within this note     Time User Action Codes Description Comment    12/10/2017  1:45 AM Morris Srivastava Add [A41 9] Sepsis ([facility] [address] )     12/10/2017  1:45 AM Costella India [A41 9] Sepsis ([facility] [address] )     12/10/2017  1:45 AM Costella India [A41 9] Sepsis ([facility] [address] )     12/10/2017  1:45 AM Morris Srivastava Add [K76 9] Hepatic lesion     12/10/2017 11:24 AM Lit KHAN Add [A41 9,  R65 20] Severe sepsis ([facility] [address] )     12/12/2017  2:43 PM Radha Clark Add [K75 0] Hepatic abscess       ED Disposition     ED Disposition Condition Comment    Admit  Case was discussed with Elizabeth Bolden and the patient's admission status was agreed to be Admission Status: inpatient status to the service of Dr Rocky Baer           Follow-up Information    None       Discharge Medication List as of 12/10/2017  2:07 PM      CONTINUE these medications which have NOT CHANGED    Details   Dutasteride-Tamsulosin HCl 0 5-0 4 MG CAPS Take by mouth daily at bedtime, Historical Med      levothyroxine 50 mcg tablet Take 50 mcg by mouth daily, Historical Med      lisinopril (ZESTRIL) 10 mg tablet Take 10 mg by mouth 2 (two) times a day, Historical Med      metFORMIN (GLUCOPHAGE) 500 mg tablet Take 500 mg by mouth daily, Historical Med           No discharge procedures on file      ED Provider  Electronically Signed by           Leopoldo Hymen, DO  12/12/17 100 [facility], DO  12/12/17 3004

## 2017-12-10 NOTE — SOCIAL WORK
Cm was informed patient being transferred to [facility] for a higher level of care and would need insurance auth for transport  Cm contacted accepting transport [address] who stated they are transporting the patient auth pending as they are aware patients insurance company is closed over the weekend and patient is in need of treatment at a higher level of care  Cm contacted patient insurance this day and confirmed company is closed for the weekend  Cm team will pursue insurance auth next business day

## 2017-12-10 NOTE — DISCHARGE SUMMARY
Discharge Summary - [facility]    Patient Information: Jc Cooper 65 y o  male MRN: [id number]  Unit/Bed#: -01 Encounter: [id number]    Discharging Physician / Practitioner: Cait Jarquin MD  PCP: No primary care provider on file  Admission Date: 12/9/2017  Discharge Date: 12/10/17    Reason for Admission: altered mental status, fever    Discharge Diagnoses:     Principal Problem:  ·   Severe sepsis [facility]): patient with increasing lactic acid this am and rigor  CT shows possible liver abcess versus hemangioma  Patient being sent for MRI stat  Inpatient sepsis protocol initiated  I spoke with ID will change Cefepime to zosyn and keep Vanco for now  Patient has a retained common bile duct stent that he was to have removed on return to [place]  Patient being transferred to MICU in [facility] for closer monitoring and possible drainage if this is an abcess  Active Problems:  ·   DM (diabetes mellitus) ([facility] [address] ) holding metformin for now use sliding scale insulin  ·   HTN (hypertension):  Blood pressure on the low side giving IV fluid hydration and holding ACE-inhibitor  ·   ALDEN [facility]  Holding ACE-inhibitor and giving fluid challenge will monitor eyes and nose to assure patient is voiding as he does have history of BPH with urinary retention  ·   Hypothyroidism:  Will check TSH continue Synthroid        Consultations During Hospital Stay:  · I canceled the GI consult here at [facility] is the patient will be transferring to [facility]  Medical ICU can decide on gastroenterology consultation versus IR versus surgery versus all the above  · I have consulted Infectious Disease at [facility] in communicated with an via phone  We have adjusted his antibiotic therapy  Procedures Performed:   · CT scan of the abdomen shows enhancing hepatic lesions 14 mm right lobe of liver    There is also a 3 6 x 3 4 heterogeneous enhancing low density lesion in the dome of the right lobe  There is no intra hepatic biliary duct dilatation  Stent is presently common bile duct extending to the common hepatic and right hepatic ducts  Stomach was distended with recently  us did food  Patient has hiatal hernia no obstruction is noted patient has moderate feces in the colon compatible with constipation  No diverticulitis is noted but diverticular are noted  Appendix and gallbladder are absent  Prostatomegaly is present with possible outlet obstruction  Significant Findings / Test Results:     · Possible liver abscess  · Discharging creatinine improved to 1   29 down from 1 45  · White count initially 22 62 down to 20 93  · Hemoglobin 10 1  · Lactic acid trending from 2 3-1 0, to 4 4 this a m     Incidental Findings:   · Renal cysts no further workup3    Test Results Pending at Discharge (will require follow up): · Blood cultures are pending x2 sets 1 last night and 1 from this morning     Outpatient Tests Requested:  · None    Complications:  None    Hospital Course:     Seng Montez is a 65 y o  male patient who originally presented to the hospital on 2017 due to altered mental status and fever  Patient evidently had a cholecystectomy with complications requiring placement of common bile duct stent back in   He was to have the stent removed on 2017  Evidently the cholecystectomy was emergent  The patient came to [place] for a wedding and while here started to feel shaky and have fever  He started to take Tylenol but did not have improvement in the fever  His wife states he was actually confused  He came to the emergency room at the urging guests who were physicians at the wedding  He was found to have sepsis related to a possible liver abscess noted on CT scan  This morning the patient developed poor eager stat lactic acid was sent showing worsening condition  I communicated with Infectious Disease and adjusted his antibiotics    I initiated severe sepsis protocol initiated fluid boluses  We reviewed the case with ICU  Patient will need to be transferred to [place] for further care due to no ICU beds and the likelihood of needing drainage through an interventional radiologist which would not be available here today  At the time of transfer the patient is awake alert and oriented rigors have continue discontinued  We are obtain MRI to help guide therapy  I have reviewed the case with Nephrology to assure that it would be safe to give him gadolinium at this time his renal function is reasonable  We will need to do a he retention protocol on him however as it does appear that he has prostatomegaly and may have urinary output put issues  Condition at Discharge: fair     Discharge Day Visit / Exam:     Subjective:  Patient found this morning to be shaking chills  He is awake alert and very pleasant denies any pain but is having some nausea    Vitals: Blood Pressure: 105/50 (12/10/17 1038)  Pulse: (!) 126 (RN aware) (12/10/17 1038)  Temperature: (!) 101 8 °F (38 8 °C) (12/10/17 1038)  Temp Source: Axillary (12/10/17 1038)  Respirations: 18 (12/10/17 1038)  Height: 5' 7" (170 2 cm) (12/10/17 0300)  Weight - Scale: 67 kg (147 lb 11 3 oz) (12/10/17 0300)  SpO2: 91 % (12/10/17 1038)  Exam:   Physical Exam    General:  Patient is awake alert and oriented cranial nerves 2-12 appear to be intact  Pupils equal round and reactive to light extraocular muscles intact mucous membranes are moist neck is supple there is no JVD no lymphadenopathy no oral lesions  Chest is decreased but clear to auscultation I do not appreciate any rhonchi rales or wheezes  Cardiovascular tachycardic positive S1 and S2 no S3-S4 murmur or gallop  Abdomen soft no guarding no rebound positive bowel sounds  Extremities no clubbing cyanosis edema skin is warm  Neurologically patient is awake alert oriented cranial nerves 2-12 are intact  Psychiatrically affect is appropriate    Discharge instructions/Information to patient and family:   See after visit summary for information provided to patient and family  Provisions for Follow-Up Care:  See after visit summary for information related to follow-up care and any pertinent home health orders  Disposition:     [address] Transfer to [facility] [address] to [facility] SNF:   · Not Applicable to this Patient - Not Applicable to this Patient    Planned Readmission:  Readmit to [facility]     Discharge Statement:  I spent 60 min minutes discharging the patient  This time was spent on the day of discharge  I had direct contact with the patient on the day of discharge  Greater than 50% of the total time was spent examining patient, answering all patient questions, arranging and discussing plan of care with patient as well as directly providing post-discharge instructions  Additional time then spent on discharge activities  Note critical care time of 60 minutes was spent stabilizing this patient  I performed a physical exam reviewed the chart contacted numerous sub specialist as above discussed the case with intensive care and arrange for transfer to another campus  We initiated a protocol for sepsis severe as above  I reviewed this case with the patient's wife at the bedside and am in the process of calling a family friend who they desire to have updated  Discharge Medications:  See after visit summary for reconciled discharge medications provided to patient and family        ** Please Note: This note has been constructed using a voice recognition system **

## 2017-12-10 NOTE — H&P
History and Physical - Critical Care  Eulogio Hunter 65 y o  male MRN: [id number]  [facility]: [id number]     Reason for Admission / Chief Complaint: sepsis secondary to liver mass/abscess     History of Present Illness:  Florian Barreto is a 65 y o  male w/ hx of recent cholecystectomy w/ biliary stent placement 10/25/17 presents with fevers/chills/rigors that bega two days ago  Patient states he started to feel unwell, and would periodically shake from being febrile  Tried tylenol with no alleviation of symptoms  Patient thought maybe he got sick on the plane ride, as patient is visiting from [place] for a family wedding  Patient this morning felt particularly worse, and was brought to [facility], where patient was found to be septic, with tachycardia, soft pressures, and febrile  IR imaginag found a hepaic mass in right liver lobe, and an abscess could not be ruled out  Transferred to [facility] for further management and IR drainage  On arrival, patient states he feels fine, denies any current nausea/vomiting, mild abdominal pain  Patient states he is to have his stents removed 12/20/17  When asked about why patient had cholecystectomy, patient states he had biliary sludge as well as many gallstones  Patient had stent placed at [facility] in [place]  The patient was unable to recall the GI surgeon's name  His gastroenterologist was Dr Preston Vargas [phone]  Attempted to call the GI physician for further information, and left message for Huong Burrows, Dr Miguel Puri advanced practitioner, but no one was able to be reached for further information  Also called [facility], [phone], but  was unable to transfer us to the surgeon who performed the patient's surgery, since medical records is closed on a Sunday  History obtained from the patient       Past Medical History:  Past Medical History:   Diagnosis Date    BPH (benign prostatic hyperplasia)     Diabetes mellitus ([facility] [address] )     Hypertension     Hypothyroidism         Past Surgical History:  Past Surgical History:   Procedure Laterality Date    APPENDECTOMY      CHOLECYSTECTOMY      biliary stent placement        Past Family History:  Family History   Problem Relation Age of Onset    Hypertension Mother     Dementia Father     Diabetes Brother         Social History:  History   Smoking Status    Former Smoker    Packs/day: 0 50    Years: 10 00    Types: Cigarettes    Start date: 1965    Quit date: 1975   Smokeless Tobacco    Never Used     History   Alcohol Use    Yes     Comment: occ     History   Drug Use No     Marital Status: /Civil Union    Medications:  Current Facility-Administered Medications   Medication Dose Route Frequency    acetaminophen (TYLENOL) tablet 650 mg  650 mg Oral Q6H PRN    calcium carbonate (TUMS) chewable tablet 1,000 mg  1,000 mg Oral Daily PRN    chlorhexidine (PERIDEX) 0 12 % oral rinse 15 mL  15 mL Swish & Spit Q12H [address]    insulin lispro (HumaLOG) 100 units/mL subcutaneous injection 1-5 Units  1-5 Units Subcutaneous TID AC    [START ON 12/11/2017] levothyroxine tablet 50 mcg  50 mcg Oral Early Morning    ondansetron (ZOFRAN) injection 4 mg  4 mg Intravenous Q6H PRN    phytonadione (AQUA-MEPHYTON) 10 mg/mL 10 mg in sodium chloride 0 9 % 50 mL IVPB  10 mg Intravenous Once    piperacillin-tazobactam (ZOSYN) 3 375 g in dextrose 5 % 50 mL IVPB  3 375 g Intravenous Q6H [address]    sodium chloride (PF) 0 9 % injection 3 mL  3 mL Intravenous PRN    sodium chloride 0 9 % bolus 1,000 mL  1,000 mL Intravenous Once    sodium chloride 0 9 % infusion  100 mL/hr Intravenous Continuous    tamsulosin (FLOMAX) capsule 0 4 mg  0 4 mg Oral Daily With Dinner    [START ON 12/11/2017] vancomycin (VANCOCIN) IVPB (premix) 1,000 mg  15 mg/kg Intravenous Q24H    vancomycin (VANCOCIN) IVPB (premix) 1,000 mg  15 mg/kg Intravenous Once     Home medications:  Prior to Admission medications    Medication Sig Start Date End Date Taking? Authorizing Provider   Dutasteride-Tamsulosin HCl 0 5-0 4 MG CAPS Take by mouth daily at bedtime    Historical Provider, MD   levothyroxine 50 mcg tablet Take 50 mcg by mouth daily    Historical Provider, MD   lisinopril (ZESTRIL) 10 mg tablet Take 10 mg by mouth 2 (two) times a day    Historical Provider, MD   metFORMIN (GLUCOPHAGE) 500 mg tablet Take 500 mg by mouth daily    Historical Provider, MD     Allergies: Allergies   Allergen Reactions    Shellfish-Derived Products Anaphylaxis        ROS:   Review of Systems   Constitutional: Positive for chills and fever  Negative for fatigue  HENT: Negative for sore throat  Eyes: Negative for redness and visual disturbance  Respiratory: Negative for shortness of breath  Cardiovascular: Negative for chest pain  Gastrointestinal: Positive for abdominal pain  Negative for blood in stool, constipation, diarrhea and nausea  Endocrine: Negative for polyuria  Genitourinary: Negative for difficulty urinating  Skin: Negative for rash  Neurological: Negative for dizziness, seizures, facial asymmetry, light-headedness, numbness and headaches  Psychiatric/Behavioral: Negative for agitation, confusion and dysphoric mood  Vitals:  Vitals:    12/10/17 1441 12/10/17 1500   BP: 92/51 92/51   Pulse: (!) 112 (!) 106   Resp: (!) 24 (!) 27   Temp: 100 4 °F (38 °C)    TempSrc: Oral    SpO2: 96% 95%   Weight: 65 4 kg (144 lb 2 9 oz)    Height: 5' 7" (1 702 m)      Temperature:   Temp (24hrs), Av 6 °F (37 6 °C), Min:97 4 °F (36 3 °C), Max:103 1 °F (39 5 °C)    Current: Temperature: 100 4 °F (38 °C)     Weights:   IBW: 66 1 kg  Body mass index is 22 58 kg/m²  Physical Exam:  Physical Exam   Constitutional: He is oriented to person, place, and time  He appears well-developed and well-nourished  No distress  HENT:   Head: Normocephalic and atraumatic     Right Ear: External ear normal  Left Ear: External ear normal    Mouth/Throat: Oropharynx is clear and moist    Eyes: Pupils are equal, round, and reactive to light  Neck: Normal range of motion  Cardiovascular: Regular rhythm and normal heart sounds  tachycardic   Pulmonary/Chest: Effort normal  No respiratory distress  He has no wheezes  Abdominal: Soft  There is tenderness  There is no rebound  Mild tenderness to palpation in right lower quadrant with no rebound/gaurding, muñiz sign negative  Musculoskeletal: Normal range of motion  Neurological: He is alert and oriented to person, place, and time  No cranial nerve deficit  Skin: Skin is warm and dry  He is not diaphoretic  No erythema  Psychiatric: He has a normal mood and affect  Vitals reviewed  Labs:    Results from last 7 days  Lab Units 12/10/17  0509 12/09/17  2354   WBC Thousand/uL 20 93* 22 62*   HEMOGLOBIN g/dL 10 1* 11 0*   HEMATOCRIT % 31 0* 34 0*   PLATELETS Thousands/uL 164 169   MONO PCT MAN %  --  7      Results from last 7 days  Lab Units 12/10/17  0509 12/09/17  2354   SODIUM mmol/L 134* 134*   POTASSIUM mmol/L 3 5 3 7   CHLORIDE mmol/L 101 99*   CO2 mmol/L 25 24   BUN mg/dL 23 26*   CREATININE mg/dL 1 29 1 45*   CALCIUM mg/dL 7 8* 8 3   TOTAL PROTEIN g/dL  --  6 6   BILIRUBIN TOTAL mg/dL  --  1 20*   ALK PHOS U/L  --  72   ALT U/L  --  73   AST U/L  --  64*   GLUCOSE RANDOM mg/dL 168* 157*       Results from last 7 days  Lab Units 12/09/17  2354   MAGNESIUM mg/dL 1 5*            Results from last 7 days  Lab Units 12/09/17  2354   INR  1 34*   PTT seconds 41*       Results from last 7 days  Lab Units 12/10/17  1202   LACTIC ACID mmol/L 1 8       0  Lab Value Date/Time   TROPONINI <0 02 12/09/2017 2354        Imaging:  I have personally reviewed pertinent reports  Xr Chest 2 Views  Result Date: 12/10/2017  Impression: No active pulmonary disease       Mri Abdomen W Wo Contrast  Result Date: 12/10/2017  Impression:   -4 5 cm right hepatic lobe mass is indeterminate and restricts diffusion  Although abscess remains in the differential diagnosis, its imaging appearance slightly favor a solid mass  Needle sampling is necessary for further differentiation and therefore recommended    -1 7 cm posterior right hepatic lobe lesion is difficult to characterize given its small size although has imaging features favoring cavernous hemangioma    -1 7 x 1 2 cm indeterminate left adrenal nodule  Ct Abdomen Pelvis With Contrast  Result Date: 12/10/2017  Impression:   1  Enhancing hepatic lesions as described above  Differential would include atypical hemangioma, hepatic neoplasm, either primary or metastatic or hepatic abscess, given the history of fever and recent gallbladder surgery  It is recommended the patient have a follow-up MRI of the liver with gadolinium, for further evaluation  2   Prostatomegaly with probable bladder outlet obstruction  Correlation with serum PSA  3   Colonic diverticulosis  Micro:  No results found for: Glo Stade, SPUTUMCULTUR    Assessment: 65 y o  M presenting w/ sepsis secondary to liver mass/abscess    Plan:     Neuro:  //Analgesia: Tylenol PRN     CV: Goal MAP > 65   //HTN: holding home lisinopril    //Access:    Left peripheral  Right peripheral x2       Pulm: Goal SpO2 > 95  -Incentive spirometry    GI:   //Liver mass/abscess: IR consulted, IR guided drainage pending    -4 5 cm right hepatic lobe mass on MRI  -s/p cholecystectomy 10/25/17  [ ] AM CMP pending     //Bowel Regimen: senna prn    //nausea: zofran PRN     : Trend Is and Os  Trend UOP and BUN/creatinine   //BPH: home tamsulosin    //Indwelling Erwin present: no        F/E/N:     //Fluids: Rate: 100 cc/hr     //Electrolytes: Replete PRN  Goals K >4 0, Mag >2 0, and Phos >3 0    //Nutrition: NPO prior to IR guided drainage  ID: Trend white count and fever curve   //Liver abscess:  - Vancomycin + Zosyn   D/C vanc once if able to post IR procedure  - lactate 1 8, not trending  Heme: Trend H and H   //Elevated PT/INR:  Vit K 10 mg IV given     //DVT ppx: to be resumed 24 hours post IR procedure       Endo:   //DM: holding home metformin, on Insulin Sliding Scale while in hospital      //Hypothyroidism: Home levothyroxine  Msk/Skin: Frequent turning and pressure offloading    //OOB if possible  //PT/OT to see patient when able     Disposition: ICU level care       VTE Pharmacologic Prophylaxis: RX contraindicated due to: going to IR  VTE Mechanical Prophylaxis: sequential compression device     Invasive lines and devices: Invasive Devices     Peripheral Intravenous Line            Peripheral IV 12/09/17 Left Antecubital 1 day    Peripheral IV 12/10/17 Right Antecubital less than 1 day    Peripheral IV 12/10/17 Right Hand less than 1 day                 Code Status: Level 1 - Full Code  POA:    POLST:       Given critical illness, patient length of stay will require greater than two midnights  Counseling / Coordination of Care  Total Critical Care time spent 30 minutes excluding procedures, teaching and family updates  Portions of the record may have been created with voice recognition software  Occasional wrong word or "sound a like" substitutions may have occurred due to the inherent limitations of voice recognition software  Read the chart carefully and recognize, using context, where substitutions have occurred          Sarah Serrano MD

## 2017-12-10 NOTE — H&P
History and Physical - [facility]    Patient Information: Teresa Bolton 65 y o  male MRN: [id number]  Unit/Bed#: ED 32 Encounter: [id number]  Admitting Physician: Yanira Raymond PA-C  PCP: No primary care provider on file  Date of Admission:  12/10/17    Assessment/Plan:    Hospital Problem List:     Principal Problem:    Sepsis ([facility] )  Active Problems:    DM (diabetes mellitus) ([facility] )    HTN (hypertension)    ALDEN (acute kidney injury) ([place] [address] )      Plan for the Primary Problem(s):  · Sepsis  · Admit  · Trend lactic acid  · IV fluids  · IV antibiotics, cefepime and vancomycin  · Liver ultrasound pending  · Consult GI  · Etiology unclear, but suspicious of possible hepatic abscess based on CT findings and recent cholecystectomy with stent placement  Plan for Additional Problems:   · Diabetes - hemoglobin A1c pending, insulin sliding scale  · Hypertension - will monitor, holding lisinopril secondary to acute kidney injury  · Acute kidney injury - IV fluids, recheck creatinine, avoid nephrotoxic drugs    VTE Prophylaxis: Heparin  / sequential compression device   Code Status:  Full  POLST: There is no POLST form on file for this patient (pre-hospital)    Anticipated Length of Stay:  Patient will be admitted on an  inpatient  basis with an anticipated length of stay of  more than 2 midnights  Justification for Hospital Stay:  IV antibiotics    Total Time for Visit, including Counseling / Coordination of Care: 30 minutes  Greater than 50% of this total time spent on direct patient counseling and coordination of care  Chief Complaint:   Fever    History of Present Illness:    Teresa Bolton is a 65 y o  male who presents with complaints of fever and not feeling well that began yesterday  Patient states he has been very shaky  Patient states he has been taking Tylenol but the fever keeps returning  Patient is visiting from [place] for a family wedding    Patient states that while at the wedding today he started to feel worse and was brought to the hospital via EMS  Patient status post cholecystectomy with stent placement on 10/25/17  Patient is scheduled for stent removal on 12/20/2017  Patient states he had an emergent cholecystectomy  Patient states he does have abdominal pain but denies nausea, vomiting, chest pain, shortness of breath  Review of Systems:    Review of Systems   Constitutional: Positive for fever  All other systems reviewed and are negative  Past Medical and Surgical History:     Past Medical History:   Diagnosis Date    Diabetes mellitus ([facility] )     Hypertension        Past Surgical History:   Procedure Laterality Date    APPENDECTOMY      CHOLECYSTECTOMY         Meds/Allergies:    Prior to Admission medications    Medication Sig Start Date End Date Taking? Authorizing Provider   Dutasteride-Tamsulosin HCl 0 5-0 4 MG CAPS Take by mouth daily at bedtime   Yes Historical Provider, MD   levothyroxine 50 mcg tablet Take 50 mcg by mouth daily   Yes Historical Provider, MD   lisinopril (ZESTRIL) 10 mg tablet Take 10 mg by mouth 2 (two) times a day   Yes Historical Provider, MD   metFORMIN (GLUCOPHAGE) 500 mg tablet Take 500 mg by mouth daily   Yes Historical Provider, MD     I have reviewed home medications with patient personally  Allergies: No Known Allergies    Social History:     Marital Status: /Civil Union   Occupation:  Retired  Patient Pre-hospital Living Situation:  Lives at home in [place], patient is visiting for a family wedding  Patient Pre-hospital Level of Mobility:  Ambulatory  Patient Pre-hospital Diet Restrictions:  Diabetic  Substance Use History:   History   Alcohol Use    Yes     Comment: occ     History   Smoking Status    Former Smoker   Smokeless Tobacco    Never Used     History   Drug Use No       Family History:    History reviewed  No pertinent family history      Physical Exam:     Vitals:   Blood Pressure: 107/56 (12/10/17 0106)  Pulse: 101 (12/10/17 0106)  Temperature: (!) 103 1 °F (39 5 °C) (12/10/17 0011)  Temp Source: Rectal (12/10/17 0011)  Respirations: 20 (12/10/17 0106)  Weight - Scale: 66 5 kg (146 lb 9 7 oz) (12/09/17 2334)  SpO2: 96 % (12/10/17 0106)    Physical Exam   Constitutional: He is oriented to person, place, and time  He appears well-developed and well-nourished  He has a sickly appearance  HENT:   Head: Normocephalic and atraumatic  Right Ear: External ear normal    Left Ear: External ear normal    Nose: Nose normal    Mouth/Throat: Oropharynx is clear and moist    Eyes: Conjunctivae and EOM are normal  Pupils are equal, round, and reactive to light  Neck: Normal range of motion  Cardiovascular: Normal rate, regular rhythm and normal heart sounds  Pulmonary/Chest: Effort normal and breath sounds normal    Abdominal: Soft  Bowel sounds are normal  There is tenderness in the right upper quadrant and right lower quadrant  There is no CVA tenderness  Musculoskeletal: Normal range of motion  Neurological: He is alert and oriented to person, place, and time  Skin: Skin is warm and dry  Psychiatric: He has a normal mood and affect  His behavior is normal  Judgment and thought content normal    Vitals reviewed  Additional Data:     Lab Results: I have personally reviewed pertinent reports          Results from last 7 days  Lab Units 12/09/17  2354   WBC Thousand/uL 22 62*   HEMOGLOBIN g/dL 11 0*   HEMATOCRIT % 34 0*   PLATELETS Thousands/uL 169   LYMPHO PCT % 2*   MONO PCT MAN % 7   EOSINO PCT MANUAL % 0       Results from last 7 days  Lab Units 12/09/17  2354   SODIUM mmol/L 134*   POTASSIUM mmol/L 3 7   CHLORIDE mmol/L 99*   CO2 mmol/L 24   BUN mg/dL 26*   CREATININE mg/dL 1 45*   CALCIUM mg/dL 8 3   TOTAL PROTEIN g/dL 6 6   BILIRUBIN TOTAL mg/dL 1 20*   ALK PHOS U/L 72   ALT U/L 73   AST U/L 64*   GLUCOSE RANDOM mg/dL 157*       Results from last 7 days  Lab Units 12/09/17  2354   INR 1 34*       Imaging: I have personally reviewed pertinent reports  CT abdomen - [facility] report - status post cholecystectomy  Hepatic lesion noted, cannot rule out abscess    EKG, Pathology, and Other Studies Reviewed on Admission:   · EKG:     Allscripts / Epic Records Reviewed: Yes     ** Please Note: This note has been constructed using a voice recognition system   **

## 2017-12-10 NOTE — PLAN OF CARE
DISCHARGE PLANNING     Discharge to home or other facility with appropriate resources Progressing        GASTROINTESTINAL - ADULT     Minimal or absence of nausea and/or vomiting Progressing     Maintains or returns to baseline bowel function Progressing     Maintains adequate nutritional intake Progressing        INFECTION - ADULT     Absence or prevention of progression during hospitalization Progressing     Absence of fever/infection during neutropenic period Progressing        Knowledge Deficit     Patient/family/caregiver demonstrates understanding of disease process, treatment plan, medications, and discharge instructions Progressing        METABOLIC, FLUID AND ELECTROLYTES - ADULT     Electrolytes maintained within normal limits Progressing     Fluid balance maintained Progressing     Glucose maintained within target range Progressing        PAIN - ADULT     Verbalizes/displays adequate comfort level or baseline comfort level Progressing        Potential for Falls     Patient will remain free of falls Progressing        Prexisting or High Potential for Compromised Skin Integrity     Skin integrity is maintained or improved Progressing        SAFETY ADULT     Maintain or return to baseline ADL function Progressing     Maintain or return mobility status to optimal level Progressing

## 2017-12-11 LAB
ALBUMIN SERPL BCP-MCNC: 2.1 G/DL (ref 3.5–5)
ALP SERPL-CCNC: 72 U/L (ref 46–116)
ALT SERPL W P-5'-P-CCNC: 155 U/L (ref 12–78)
ANION GAP SERPL CALCULATED.3IONS-SCNC: 8 MMOL/L (ref 4–13)
ANION GAP SERPL CALCULATED.3IONS-SCNC: 9 MMOL/L (ref 4–13)
AST SERPL W P-5'-P-CCNC: 140 U/L (ref 5–45)
BASOPHILS # BLD MANUAL: 0 THOUSAND/UL (ref 0–0.1)
BASOPHILS NFR MAR MANUAL: 0 % (ref 0–1)
BILIRUB SERPL-MCNC: 0.97 MG/DL (ref 0.2–1)
BUN SERPL-MCNC: 26 MG/DL (ref 5–25)
BUN SERPL-MCNC: 27 MG/DL (ref 5–25)
CALCIUM SERPL-MCNC: 6.8 MG/DL (ref 8.3–10.1)
CALCIUM SERPL-MCNC: 7 MG/DL (ref 8.3–10.1)
CHLORIDE SERPL-SCNC: 109 MMOL/L (ref 100–108)
CHLORIDE SERPL-SCNC: 110 MMOL/L (ref 100–108)
CO2 SERPL-SCNC: 20 MMOL/L (ref 21–32)
CO2 SERPL-SCNC: 22 MMOL/L (ref 21–32)
CREAT SERPL-MCNC: 1.11 MG/DL (ref 0.6–1.3)
CREAT SERPL-MCNC: 1.27 MG/DL (ref 0.6–1.3)
EOSINOPHIL # BLD MANUAL: 0 THOUSAND/UL (ref 0–0.4)
EOSINOPHIL NFR BLD MANUAL: 0 % (ref 0–6)
ERYTHROCYTE [DISTWIDTH] IN BLOOD BY AUTOMATED COUNT: 14.5 % (ref 11.6–15.1)
EST. AVERAGE GLUCOSE BLD GHB EST-MCNC: 126 MG/DL
GFR SERPL CREATININE-BSD FRML MDRD: 54 ML/MIN/1.73SQ M
GFR SERPL CREATININE-BSD FRML MDRD: 63 ML/MIN/1.73SQ M
GLUCOSE SERPL-MCNC: 113 MG/DL (ref 65–140)
GLUCOSE SERPL-MCNC: 114 MG/DL (ref 65–140)
GLUCOSE SERPL-MCNC: 140 MG/DL (ref 65–140)
GLUCOSE SERPL-MCNC: 154 MG/DL (ref 65–140)
GLUCOSE SERPL-MCNC: 223 MG/DL (ref 65–140)
GLUCOSE SERPL-MCNC: 94 MG/DL (ref 65–140)
GLUCOSE SERPL-MCNC: 95 MG/DL (ref 65–140)
HBA1C MFR BLD: 6 % (ref 4.2–6.3)
HCT VFR BLD AUTO: 29.7 % (ref 36.5–49.3)
HGB BLD-MCNC: 10 G/DL (ref 12–17)
LACTATE SERPL-SCNC: 1.3 MMOL/L (ref 0.5–2)
LYMPHOCYTES # BLD AUTO: 0.59 THOUSAND/UL (ref 0.6–4.47)
LYMPHOCYTES # BLD AUTO: 3 % (ref 14–44)
MCH RBC QN AUTO: 27 PG (ref 26.8–34.3)
MCHC RBC AUTO-ENTMCNC: 33.7 G/DL (ref 31.4–37.4)
MCV RBC AUTO: 80 FL (ref 82–98)
MONOCYTES # BLD AUTO: 0.79 THOUSAND/UL (ref 0–1.22)
MONOCYTES NFR BLD: 4 % (ref 4–12)
NEUTROPHILS # BLD MANUAL: 18.11 THOUSAND/UL (ref 1.85–7.62)
NEUTS BAND NFR BLD MANUAL: 5 % (ref 0–8)
NEUTS SEG NFR BLD AUTO: 87 % (ref 43–75)
NRBC BLD AUTO-RTO: 0 /100 WBCS
PLATELET # BLD AUTO: 142 THOUSANDS/UL (ref 149–390)
PLATELET BLD QL SMEAR: ABNORMAL
PMV BLD AUTO: 10.8 FL (ref 8.9–12.7)
POTASSIUM SERPL-SCNC: 3.8 MMOL/L (ref 3.5–5.3)
POTASSIUM SERPL-SCNC: 4 MMOL/L (ref 3.5–5.3)
PROT SERPL-MCNC: 5.4 G/DL (ref 6.4–8.2)
RBC # BLD AUTO: 3.7 MILLION/UL (ref 3.88–5.62)
RBC MORPH BLD: NORMAL
SODIUM SERPL-SCNC: 139 MMOL/L (ref 136–145)
SODIUM SERPL-SCNC: 139 MMOL/L (ref 136–145)
VARIANT LYMPHS # BLD AUTO: 1 %
WBC # BLD AUTO: 19.68 THOUSAND/UL (ref 4.31–10.16)

## 2017-12-11 PROCEDURE — 82948 REAGENT STRIP/BLOOD GLUCOSE: CPT

## 2017-12-11 PROCEDURE — 85007 BL SMEAR W/DIFF WBC COUNT: CPT | Performed by: EMERGENCY MEDICINE

## 2017-12-11 PROCEDURE — 80053 COMPREHEN METABOLIC PANEL: CPT | Performed by: EMERGENCY MEDICINE

## 2017-12-11 PROCEDURE — 83036 HEMOGLOBIN GLYCOSYLATED A1C: CPT | Performed by: NURSE PRACTITIONER

## 2017-12-11 PROCEDURE — 85027 COMPLETE CBC AUTOMATED: CPT | Performed by: EMERGENCY MEDICINE

## 2017-12-11 RX ORDER — OXYCODONE HYDROCHLORIDE 5 MG/1
5 TABLET ORAL EVERY 4 HOURS PRN
Status: DISCONTINUED | OUTPATIENT
Start: 2017-12-11 | End: 2017-12-22 | Stop reason: HOSPADM

## 2017-12-11 RX ADMIN — SODIUM CHLORIDE 100 ML/HR: 0.9 INJECTION, SOLUTION INTRAVENOUS at 03:31

## 2017-12-11 RX ADMIN — Medication 3.38 G: at 05:43

## 2017-12-11 RX ADMIN — ACETAMINOPHEN 650 MG: 325 TABLET, FILM COATED ORAL at 03:27

## 2017-12-11 RX ADMIN — INSULIN LISPRO 1 UNITS: 100 INJECTION, SOLUTION INTRAVENOUS; SUBCUTANEOUS at 12:28

## 2017-12-11 RX ADMIN — INSULIN LISPRO 1 UNITS: 100 INJECTION, SOLUTION INTRAVENOUS; SUBCUTANEOUS at 17:54

## 2017-12-11 RX ADMIN — TAMSULOSIN HYDROCHLORIDE 0.4 MG: 0.4 CAPSULE ORAL at 17:48

## 2017-12-11 RX ADMIN — Medication 3.38 G: at 23:11

## 2017-12-11 RX ADMIN — Medication 3.38 G: at 17:48

## 2017-12-11 RX ADMIN — ENOXAPARIN SODIUM 40 MG: 40 INJECTION SUBCUTANEOUS at 12:28

## 2017-12-11 RX ADMIN — OXYCODONE HYDROCHLORIDE 5 MG: 5 TABLET ORAL at 19:36

## 2017-12-11 RX ADMIN — OXYCODONE HYDROCHLORIDE 5 MG: 5 TABLET ORAL at 04:01

## 2017-12-11 RX ADMIN — LEVOTHYROXINE SODIUM 50 MCG: 50 TABLET ORAL at 05:43

## 2017-12-11 RX ADMIN — ACETAMINOPHEN 650 MG: 325 TABLET, FILM COATED ORAL at 14:44

## 2017-12-11 RX ADMIN — Medication 3.38 G: at 12:34

## 2017-12-11 NOTE — PROGRESS NOTES
Progress Note -   ICU Transfer to Step down/med  surg  - Eulogio Hunter 65 y o  male MRN: [id number]    Unit/Bed#: MICU 10 Encounter: [id number]      Code Status: Level 1 - Full Code    Date of ICU admission: 12/10/2017    Reason for ICU adm: Sepsis ([place] [address] ) [A41 9]    Active problems:   Patient Active Problem List   Diagnosis    Severe sepsis ([place] [address] )    Type 2 diabetes mellitus with hyperglycemia ([place] [address] )    HTN (hypertension)    ALDEN (acute kidney injury) ([place] [address] )    Hypothyroidism    BPH (benign prostatic hyperplasia)    Left upper quadrant abdominal abscess ([place] [address] )    Cholelithiasis with biliary obstruction S/P Cholecystectomy 6 weeks ago    Lactic acid acidosis    Anemia    Leukocytosis with bandemia       Summary of clinical course:   Thaddeus Louis is a 65 y o  male w/ hx of recent cholecystectomy w/ biliary stent placement 10/25/17 presents with fevers/chills/rigors that began two days ago  Patient states he started to feel unwell, and would periodically shake from being febrile  Tried tylenol with no alleviation of symptoms  Patient thought maybe he got sick on the plane ride, as patient is visiting from [place] for a family wedding  Patient this morning felt particularly worse, and was brought to [facility], where patient was found to be septic, with tachycardia, soft pressures, and febrile  IR imaginag found a hepatic mass in right liver lobe, and an abscess could not be ruled out  Transferred to [facility] for further management and IR drainage      On arrival, patient stated he feels fine, denied any current nausea/vomiting, mild abdominal pain  Patient states he is to have his stents removed 12/20/17  When asked about why patient had cholecystectomy, patient states he had biliary sludge as well as many gallstones      Patient had stent placed at [facility] in [place]  The patient was unable to recall the GI surgeon's name  His gastroenterologist is Dr Mireille Montenegro [phone]  Attempted to call the GI physician for further information, and left message for Zahida Khan, Dr Ngoc Mijares advanced practitioner, but no one was able to be reached for further information  Also called LIZZETH, [phone] but were unable to contact GI surgeon  Pt returned from IR appearing better  Systolic BP in 684P  IR did not find a hepatic abscess to drain, unable to aspirate purulent material  Tissue biopsies and cultures sent  Post op end points within normal limits  Lactate was not elevated, no Hg drop  Continue to monitor on the floor to ensure patient stable for discharge  Please keep patient's PCP updated, Dr Josefa Jones [phone]  Recent or scheduled procedures: none    Recent diagnostics:   IR guided tissue biopsy of right hepatic lobe    Neuro:  //Analgesia: Tylenol and 5 mg oxy PRN     CV: Goal MAP > 65   - pressures have been stable without need for pressors   //HTN: holding home lisinopril for now    Pulm: Goal SpO2 > 95  -Incentive spirometry     GI:   //Liver mass/abscess:  -4 4 x 4 2 x 4 5 cm right anterior hepatic mass consistent with abscess after cholecystectomy, but MRI findings suggestive of solid mass  1 7 x 1 3 cm posterior right hepatic lobe nodule suggestive of cavernous hemangioma  - IR attempted aspiration of right hepatic lesion, did not yield fluid, suggesting solid mass  [ ] follow up tissue biopsy and tissue culture results    //Bowel Regimen: senna prn    //nausea: zofran PRN     : Trend Is and Os   Trend UOP and BUN/creatinine   //BPH: home tamsulosin     //Indwelling Erwin present: no      F/E/N:      //Fluids: PO intake  //Electrolytes: Replete PRN   //Nutrition: Vegetarian diet     ID: Trend white count and fever curve    //Liver abscess:  Zosyn; transition to PO abx prior to discharge     Heme: Trend H and H   //Elevated PT/INR:  Vit K 10 mg IV given 12/10  //DVT ppx:  lovenox 40 mg daily  Endo:   //DM: holding home metformin, on Insulin Sliding Scale while in hospital    //Hypothyroidism: Home levothyroxine  Msk/Skin: Frequent turning and pressure offloading  //OOB if possible     Hospital discharge planning:  Med surg to ensure patient remains stable prior to discharge    Spoke to Dr Dulce Wilkerson about the transfer

## 2017-12-11 NOTE — PROGRESS NOTES
Post Op Progress note    The Pt returned from IR procedure doing well  The pt was scheduled to have IR percutaneous drainage of presumed hepatic abscesses identified on CT  However intra-procedurally, IR was unable to aspirate any purulent material, and so tissue needle biopsies were taken to evaluate presumably solid masses versus abscesses  The pt tolerated the procedure well under propofol  He returns to the unit awake, alert, in epain at the biopsy site  He has no nausea or vomiting  He has an appetite and is requesting to eat  /56   Pulse 99   Temp 98 8 °F (37 1 °C) (Oral)   Resp 20   Ht 5' 7" (1 702 m)   Wt 65 4 kg (144 lb 2 9 oz)   SpO2 93%   BMI 22 58 kg/m²      Physical Exam   Constitutional: He is oriented to person, place, and time  He appears well-developed and well-nourished  No distress  HENT:   Head: Normocephalic and atraumatic  Mouth/Throat: Oropharynx is clear and moist    Eyes: EOM are normal  Pupils are equal, round, and reactive to light  Neck: No JVD present  No tracheal deviation present  Cardiovascular: Normal rate, regular rhythm, normal heart sounds and intact distal pulses  Exam reveals no gallop and no friction rub  No murmur heard  Pulmonary/Chest: Effort normal and breath sounds normal  No respiratory distress  He has no wheezes  He has no rales  Abdominal: Soft  Bowel sounds are normal  He exhibits no distension  There is tenderness in the right upper quadrant  There is no rebound and no guarding  Neurological: He is alert and oriented to person, place, and time  No cranial nerve deficit  He exhibits normal muscle tone  Skin: Skin is warm and dry  He is not diaphoretic  No pallor  Psychiatric: He has a normal mood and affect  His behavior is normal    Nursing note and vitals reviewed      A/P 65year old male with lesion and febrile illness s/p CT guided IR aspiration of liver lesion POD 0     -Source of fever still unidentified as liver lesions do not appear to be infectious in etiology at this point  Continue to follow blood, urine, and tissue Cx data  Follow pathology to determine the etiology of liver lesions   Continue empiric Abx coverage for now    -Check post op endpoints     -Pain control as needed    -Advance Diet as tolerated    -cardiopulmonary monitoring overnight    -PT/OT and OOB in AM    -May consider transfer out of ICU in AM if remains hemodynamically stable over night

## 2017-12-11 NOTE — PROGRESS NOTES
Progress Note - Critical Care   Eulogio Hunter 65 y o  male MRN: [id number]  Unit/Bed#: MICU 10 Encounter: [id number]    Assessment: sepsis secondary to liver mass/abscess    Plan:     Neuro:  //Analgesia: Tylenol and 5 mg oxy PRN      CV: Goal MAP > 65   - pressures have been stable without need for pressors   //HTN: holding home lisinopril     //Access:    Left peripheral  Right peripheral x2                   Pulm: Goal SpO2 > 95  -Incentive spirometry     GI:   //Liver mass/abscess:  -4 4 x 4 2 x 4 5 cm right anterior hepatic mass consistent with abscess after cholecystectomy, but MRI findings suggestive of solid mass  1 7 x 1 3 cm posterior right hepatic lobe nodule suggestive of cavernous hemangioma  - IR attempted aspiration of right hepatic lesion, did not yield fluid, suggesting solid mass  [ ] follow up tissue biopsy and tissue culture results  - AST and ALT elevated s/p IR procedure     //Bowel Regimen: senna prn     //nausea: zofran PRN                : Trend Is and Os  Trend UOP and BUN/creatinine   //BPH: home tamsulosin     //Indwelling Erwin present: no                    F/E/N:      //Fluids: Rate: 100 cc/hr     //Electrolytes: Replete PRN  Goals K >4 0, Mag >2 0, and Phos >3 0     //Nutrition: NPO prior to IR guided drainage  Advance diet today  ID: Trend white count and fever curve    -afebrile post procedure, trending white count     //Liver abscess:  - Vancomycin + Zosyn  D/C vanc once if able to post IR procedure  - lactate 1 3, not trending                   Heme: Trend H and H   //Elevated PT/INR:  Vit K 10 mg IV given 12/10     //DVT ppx:  resume 24 hours post IR procedure                   Endo:   //DM: holding home metformin, on Insulin Sliding Scale while in hospital       //Hypothyroidism: Home levothyroxine                    Msk/Skin: Frequent turning and pressure offloading    //OOB if possible   //PT/OT to see patient when able                 Disposition: Likely stepdown or med surg  Chief Complaint: Mild abdominal pain  HPI/24hr events:  Returned from IR appearing better  Systolic BP in 143H  IR did not find a hepatic abscess to drain, unable to aspirate purulent material  Tissue biopsies and cultures sent  Post op end points wnl  Lactate was not elevated, no Hg drop  Required 1 dose of 5 mg oxy for pain control  Physical Exam:   Constitutional: Pt appears well-nourished  No distress  HENT:   Head: Normocephalic and atraumatic  Eyes: EOM are normal  Pupils are equal, round, and reactive to light  Neck: Normal range of motion  Neck supple  Cardiovascular: Normal rate and regular rhythm  Pulmonary/Chest: Effort normal and breath sounds normal  No respiratory distress  Abdominal: Soft  Pt exhibits no distension  Tenderness in RUQ  IR biopsy site c/d/i  Musculoskeletal: Normal range of motion  Neurological: Pt is alert  Skin: Skin is warm and dry  Pt is not diaphoretic  Vitals reviewed  Vitals:    17 0300 17 0400 17 0500 17 0600   BP: 118/63 134/55 133/62 117/58   Pulse: 84 (!) 108 102 104   Resp: 21 (!) 25 21 20   Temp:  98 2 °F (36 8 °C)     TempSrc:  Oral     SpO2: 99% 91% 100% 96%   Weight:       Height:           Temperature:   Temp (24hrs), Av 8 °F (37 1 °C), Min:97 4 °F (36 3 °C), Max:101 8 °F (38 8 °C)  T max at 10 AM yesterday  Current: Temperature: 98 2 °F (36 8 °C)  Has remained afebrile overnight    Weights:   IBW: 66 1 kg    Body mass index is 22 58 kg/m²    Weight (last 2 days)     Date/Time   Weight    12/10/17 1441  65 4 (144 18)              Hemodynamic Monitoring:  None      Intake and Outputs:  I/O        07 - 12/10 0700 12/10 0701 -  0700    I V  (mL/kg)  1048 3 (16)    IV Piggyback  1480    Total Intake(mL/kg)  2528 3 (38 7)    Urine (mL/kg/hr)  550    Total Output   550    Net   + 3              Nutrition:        Diet Orders            Start     Ordered    12/10/17 2053  Diet NPO; Ice chips  Diet effective now     Question Answer Comment   Diet Type NPO    NPO Except: Ice chips    RD to adjust diet per protocol? Yes        12/10/17 2053          Labs:     Results from last 7 days  Lab Units 12/11/17  0555 12/10/17  2341 12/10/17  0509 12/09/17  2354   WBC Thousand/uL 19 68* 21 27* 20 93* 22 62*   HEMOGLOBIN g/dL 10 0* 10 0* 10 1* 11 0*   HEMATOCRIT % 29 7* 30 3* 31 0* 34 0*   PLATELETS Thousands/uL 142* 135* 164 169   NEUTROS PCT %  --  89*  --   --    MONOS PCT %  --  5  --   --    MONO PCT MAN %  --   --   --  7        Results from last 7 days  Lab Units 12/11/17  0555 12/10/17  2341 12/10/17  0509 12/09/17  2354   SODIUM mmol/L 139 139 134* 134*   POTASSIUM mmol/L 3 8 4 0 3 5 3 7   CHLORIDE mmol/L 110* 109* 101 99*   CO2 mmol/L 20* 22 25 24   BUN mg/dL 27* 26* 23 26*   CREATININE mg/dL 1 11 1 27 1 29 1 45*   CALCIUM mg/dL 7 0* 6 8* 7 8* 8 3   TOTAL PROTEIN g/dL 5 4*  --   --  6 6   BILIRUBIN TOTAL mg/dL 0 97  --   --  1 20*   ALK PHOS U/L 72  --   --  72   ALT U/L 155*  --   --  73   AST U/L 140*  --   --  64*   GLUCOSE RANDOM mg/dL 95 113 168* 157*       Results from last 7 days  Lab Units 12/09/17  2354   MAGNESIUM mg/dL 1 5*            Results from last 7 days  Lab Units 12/09/17  2354   INR  1 34*   PTT seconds 41*       Results from last 7 days  Lab Units 12/10/17  2341   LACTIC ACID mmol/L 1 3       0  Lab Value Date/Time   TROPONINI <0 02 12/09/2017 2354       Imaging:      Xr Chest 2 Views  Result Date: 12/10/2017  Impression: No active pulmonary disease       Mri Abdomen W Wo Contrast  Result Date: 12/10/2017  Impression:   -4 5 cm right hepatic lobe mass is indeterminate and restricts diffusion  Although abscess remains in the differential diagnosis, its imaging appearance slightly favor a solid mass    Needle sampling is necessary for further differentiation and therefore recommended    -1 7 cm posterior right hepatic lobe lesion is difficult to characterize given its small size although has imaging features favoring cavernous hemangioma    -1 7 x 1 2 cm indeterminate left adrenal nodule      Ct Abdomen Pelvis With Contrast  Result Date: 12/10/2017  Impression:   1  Enhancing hepatic lesions as described above  Differential would include atypical hemangioma, hepatic neoplasm, either primary or metastatic or hepatic abscess, given the history of fever and recent gallbladder surgery  It is recommended the patient have a follow-up MRI of the liver with gadolinium, for further evaluation  2   Prostatomegaly with probable bladder outlet obstruction  Correlation with serum PSA  3   Colonic diverticulosis  Micro:  No results found for: DEVIKA SuarezCULTCARISSA    Allergies: Allergies   Allergen Reactions    Shellfish-Derived Products Anaphylaxis       Medications:   Scheduled Meds:    chlorhexidine 15 mL Swish & Spit Q12H [address]   insulin lispro 1-5 Units Subcutaneous TID AC   levothyroxine 50 mcg Oral Early Morning   piperacillin-tazobactam 3 375 g Intravenous Q6H [address]   tamsulosin 0 4 mg Oral Daily With Dinner   vancomycin 15 mg/kg Intravenous Q24H     Continuous Infusions:    sodium chloride 100 mL/hr Last Rate: 100 mL/hr (12/11/17 0331)     PRN Meds:    acetaminophen 650 mg Q6H PRN   calcium carbonate 1,000 mg Daily PRN   ondansetron 4 mg Q6H PRN   oxyCODONE 5 mg Q4H PRN   senna-docusate sodium 1 tablet Daily PRN   sodium chloride (PF) 3 mL PRN       VTE Pharmacologic Prophylaxis: None  Will start heparin subq today  VTE Mechanical Prophylaxis: sequential compression device    Invasive lines and devices: Invasive Devices     Peripheral Intravenous Line            Peripheral IV 12/10/17 Right Antecubital 1 day    Peripheral IV 12/10/17 Right Hand less than 1 day                   Counseling / Coordination of Care  Total Critical Care time spent 30 minutes excluding procedures, teaching and family updates         Code Status: Level 1 - Full Code     Portions of the record may have been created with voice recognition software  Occasional wrong word or "sound a like" substitutions may have occurred due to the inherent limitations of voice recognition software  Read the chart carefully and recognize, using context, where substitutions have occurred       Wil Carey MD

## 2017-12-11 NOTE — SOCIAL WORK
CM met with pt and his wife at bedside  CM reviewed role with dcp  Pt resides with his wife in a single story home with ramp entrance  Pt is independent with ADLs and ambulation PTA  Pt has no hx of STR or VNA  No hx of MH or drug/alcohol abuse  Pharmacy - [facility] in [facility]  Pt was due to fly back to [place] today  Pt would like to return home as soon as possible  Both are retired and his wife is his help at home  CM to follow for dcp  CM reviewed d/c planning process including the following: identifying help at home, patient preference for d/c planning needs, Discharge Lounge, [facility] Meds to Bed program, availability of treatment team to discuss questions or concerns patient and/or family may have regarding understanding medications and recognizing signs and symptoms once discharged  CM also encouraged patient to follow up with all recommended appointments after discharge  Patient advised of importance for patient and family to participate in managing patients medical well being

## 2017-12-11 NOTE — PROGRESS NOTES
Spoke with family friend Dr Loren Spivey who per wife is OK to give updates to   She would like an update as well via phone [phone]

## 2017-12-11 NOTE — CASE MANAGEMENT
[facility] in the [facility] by Arnold Ulrich for 2017  Network Utilization Review Department  Phone: [phone]; Fax [phone]  ATTENTION: The Network Utilization Review Department is now centralized for our 7 Facilities  Make a note that we have a new phone and fax numbers for our Department  Please call with any questions or concerns to [phone] and carefully follow the prompts so that you are directed to the right person  All voicemails are confidential  Fax any determinations, approvals, denials, and requests for initial or continue stay review clinical to [phone]  Due to HIGH CALL volume, it would be easier if you could please send faxed requests to expedite your requests and in part, help us provide discharge notifications faster  Initial Clinical Review    Admission: Date/Time/Statement: 12/10/17 @ 1429     Orders Placed This Encounter   Procedures    Inpatient Admission     Standing Status:   Standing     Number of Occurrences:   1     Order Specific Question:   Admitting Physician     Answer:   Maria Del Carmen Best [[id number]]     Order Specific Question:   Level of Care     Answer:   Critical Care [15]     Order Specific Question:   Estimated length of stay     Answer:   More than 2 Midnights     Order Specific Question:   Certification     Answer:   I certify that inpatient services are medically necessary for this patient for a duration of greater than two midnights  See H&P and MD Progress Notes for additional information about the patient's course of treatment  ED: Date/Time/Mode of Arrival:   ED Arrival Information     Patient not seen in ED  TRANSFER FROM [address]                      History of Illness: Drew Oliver is a 65 y o  male w/ hx of recent cholecystectomy w/ biliary stent placement 10/25/17 presents with fevers/chills/rigors that bega two days ago   Patient states he started to feel unwell, and would periodically shake from being febrile  Tried tylenol with no alleviation of symptoms  Patient thought maybe he got sick on the plane ride, as patient is visiting from [place] for a family wedding  Patient this morning felt particularly worse, and was brought to [facility], where patient was found to be septic, with tachycardia, soft pressures, and febrile  IR imaginag found a hepaic mass in right liver lobe, and an abscess could not be ruled out  Transferred to [facility] for further management and IR drainage      On arrival, patient states he feels fine, denies any current nausea/vomiting, mild abdominal pain  Patient states he is to have his stents removed 12/20/17  When asked about why patient had cholecystectomy, patient states he had biliary sludge as well as many gallstones      Patient had stent placed at [facility] in [place]  The patient was unable to recall the GI surgeon's name  His gastroenterologist was Dr Liana Paz [phone]  Attempted to call the GI physician for further information, and left message for Jesse Salazar, Dr Roel Perez advanced practitioner, but no one was able to be reached for further information   Also called [facility], [phone], but  was unable to transfer us to the surgeon who performed the patient's surgery, since medical records is closed on a Sunday       ED Vital Signs:   ED Triage Vitals   Temperature Pulse Respirations Blood Pressure SpO2   12/10/17 1441 12/10/17 1441 12/10/17 1441 12/10/17 1441 12/10/17 1441   100 4 °F (38 °C) (!) 112 (!) 24 92/51 96 %      Temp Source Heart Rate Source Patient Position - Orthostatic VS BP Location FiO2 (%)   12/10/17 1441 12/10/17 1441 12/10/17 2005 12/10/17 1441 --   Oral Monitor Lying Right arm       Pain Score       12/10/17 1441       No Pain        Wt Readings from Last 1 Encounters:   12/10/17 65 4 kg (144 lb 2 9 oz)     Vital Signs (abnormal):   12/11/17 1000  --   108   28  115/60  89  100 %  --  --   12/11/17 0900  98 4 °F (36 9 °C)  102   23  136/69  87  100 %  None (Room air)  Sitting   12/11/17 0800  --  94   26  123/59  87  97 %  --  --   12/11/17 0400  98 2 °F (36 8 °C)   108   25  134/55  93  91 %  None (Room air)  Lying   12/10/17 1959  --  93  15   88/52  67  99 %  Nasal cannula  --   12/10/17 1900  --  88   26  114/63  76  99 %  --  --   12/10/17 1800  --  90   26   96/44  69  98 %  --  --   12/10/17 1700  98 8 °F (37 1 °C)  96   34  103/59  78  98 %  --  --   12/10/17 1600  --  98  17   91/44  62  96 %  --  --   12/10/17 1500  --   106   27  92/51  69  95 %  --  --   12/10/17 1441  100 4 °F (38 °C)   112   24  92/51  --  96 %  None (Room air)  --     Abnormal Labs:   12/9 12/10 12/11 12/12   Sodium 134 134    139    139      Chloride 99 101 109 110   CO2 24 25 22 20   BUN 26 23 26 27   Creatinine 1 45 1 29 1 27 1 11   Glucose 157 168 113 95   Calcium 8 3 7 8 6 8 7 0   AST 64   140   ALT 73   155   Total Protein 6 6   5 4   Albumin 3 0   2 1   Total Bilirubin 1 20   0 97   eGFR 46 53 54 63   Magnesium 1 5        WBC 22 62    20 93    21 27    19 68      RBC 4 12 3 74 3 68 3 70   Hemoglobin 11 0 10 1 10 0 10 0   Hematocrit 34 0 31 0 30 3 29 7   MCV 83 83 82 80   MCH 26 7 27 0 27 2 27 0   Platelets 076 427 959 142   PT/INR 17 0/1 34  PTT 41  POC glucose 146  D Dimer 1440  PT/INR   Trop WNL  Lactic acide 2 3, 4 4  12/10   Protein, UA Trace     Blood, UA Small       Diagnostic Test Results:     CT abd/pelvis - 1  Enhancing hepatic lesions as described above  Differential would include atypical hemangioma, hepatic neoplasm, either primary or metastatic or hepatic abscess, given the history of fever and recent gallbladder surgery  It is recommended the patient have a follow-up MRI of the liver with gadolinium, for further evaluation  2   Prostatomegaly with probable bladder outlet obstruction  Correlation with serum PSA  3   Colonic diverticulosis      MRI abd - 4 5 cm right hepatic lobe mass is indeterminate and restricts diffusion  Although abscess remains in the differential diagnosis, its imaging appearance slightly favor a solid mass  Needle sampling is necessary for further differentiation and therefore recommended  1 7 cm posterior right hepatic lobe lesion is difficult to characterize given its small size although has imaging features favoring cavernous hemangioma  I would recommend follow-up MRI in 3 months to ensure stability of this finding  1 7 x 1 2 cm indeterminate left adrenal nodule  This does not appear to represent a lipid rich adenoma although could represent a lipid poor adenoma  This can be reevaluated for stability at time of follow-up above  CT needle biopsy liver - Successful CT-guided biopsy  Attempted drainage which was not possible as this is a solid lesion  EKG - Sinus tachycardia  Nonspecific T wave abnormality  Abnormal ECG    ED Treatment:   Medication Administration - No Administrations Displayed (No Start Event Found)     None        Past Medical/Surgical History: Active Ambulatory Problems     Diagnosis Date Noted    Severe sepsis ([facility] ) 12/10/2017    Type 2 diabetes mellitus with hyperglycemia ([facility] ) 12/10/2017    HTN (hypertension) 12/10/2017    ALDEN (acute kidney injury) ([facility] ) 12/10/2017    Hypothyroidism 12/10/2017     Resolved Ambulatory Problems     Diagnosis Date Noted    No Resolved Ambulatory Problems     Past Medical History:   Diagnosis Date    BPH (benign prostatic hyperplasia)     Diabetes mellitus ([facility] )     Hypertension     Hypothyroidism      Admitting Diagnosis: Sepsis ([facility] ) [A41 9]    Age/Sex: 65 y o  male    Assessment/Plan:  65 y o   M presenting w/ sepsis secondary to liver mass/abscess   Plan:      Neuro:  //Analgesia: Tylenol PRN                CV: Goal MAP > 65   //HTN: holding home lisinopril     //Access:    Left peripheral  Right peripheral x2                 Pulm: Goal SpO2 > 95  -Incentive spirometry     GI: //Liver mass/abscess: IR consulted, IR guided drainage pending    -4 5 cm right hepatic lobe mass on MRI  -s/p cholecystectomy 10/25/17  [ ] AM CMP pending      //Bowel Regimen: senna prn     //nausea: zofran PRN                : Trend Is and Os  Trend UOP and BUN/creatinine   //BPH: home tamsulosin     //Indwelling Erwin present: no                  F/E/N:      //Fluids: Rate: 100 cc/hr     //Electrolytes: Replete PRN  Goals K >4 0, Mag >2 0, and Phos >3 0     //Nutrition: NPO prior to IR guided drainage  ID: Trend white count and fever curve   //Liver abscess:  - Vancomycin + Zosyn  D/C vanc once if able to post IR procedure  - lactate 1 8, not trending  Heme: Trend H and H   //Elevated PT/INR:  Vit K 10 mg IV given      //DVT ppx: to be resumed 24 hours post IR procedure                   Endo:   //DM: holding home metformin, on Insulin Sliding Scale while in hospital       //Hypothyroidism: Home levothyroxine                    Msk/Skin: Frequent turning and pressure offloading     //OOB if possible  //PT/OT to see patient when able                Disposition: ICU level care  VTE Pharmacologic Prophylaxis: RX contraindicated due to: going to IR  VTE Mechanical Prophylaxis: sequential compression device     Invasive lines and devices: Invasive Devices            Peripheral Intravenous Line               Peripheral IV 12/09/17 Left Antecubital 1 day      Peripheral IV 12/10/17 Right Antecubital less than 1 day      Peripheral IV 12/10/17 Right Hand less than 1 day                Code Status: Level 1 - Full Code  Given critical illness, patient length of stay will require greater than two midnights      Admission Orders:  Scheduled Meds:   chlorhexidine 15 mL Swish & Spit Q12H [address]   enoxaparin 40 mg Subcutaneous Q24H [address]   insulin lispro 1-5 Units Subcutaneous TID AC   levothyroxine 50 mcg Oral Early Morning   piperacillin-tazobactam 3 375 g Intravenous Q6H [address]   tamsulosin 0 4 mg Oral Daily With Dinner     Continuous Infusions:   sodium chloride 100 mL/hr Last Rate: Stopped (12/11/17 1044)     PRN Meds:   Acetaminophen x2    calcium carbonate    ondansetron    oxyCODONE x1    senna-docusate sodium    sodium chloride (PF)  · Fentanyl  _________________________________  12/10 CT guided drainage Procedure Note  Findings: Attempted aspiration/drainage of right hepatic lesion did not yield fluid suggesting this is a solid mass  Therefore, a core biopsy was obtained for pathology  ____________________________________  12/11 Critical Care Progress Note  Assessment: sepsis secondary to liver mass/abscess     Plan:      Neuro:  //Analgesia: Tylenol and 5 mg oxy PRN      CV: Goal MAP > 65   - pressures have been stable without need for pressors   //HTN: holding home lisinopril     //Access:    Left peripheral  Right peripheral x2      Pulm: Goal SpO2 > 95  -Incentive spirometry     GI:   //Liver mass/abscess:  -4 4 x 4 2 x 4 5 cm right anterior hepatic mass consistent with abscess after cholecystectomy, but MRI findings suggestive of solid mass  1 7 x 1 3 cm posterior right hepatic lobe nodule suggestive of cavernous hemangioma  - IR attempted aspiration of right hepatic lesion, did not yield fluid, suggesting solid mass  [ ] follow up tissue biopsy and tissue culture results  - AST and ALT elevated s/p IR procedure     //Bowel Regimen: senna prn     //nausea: zofran PRN     : Trend Is and Os  Trend UOP and BUN/creatinine   //BPH: home tamsulosin     //Indwelling Erwin present: no       F/E/N:      //Fluids: Rate: 100 cc/hr     //Electrolytes: Replete PRN  Goals K >4 0, Mag >2 0, and Phos >3 0     //Nutrition: NPO prior to IR guided drainage  Advance diet today      ID: Trend white count and fever curve    -afebrile post procedure, trending white count      //Liver abscess:  - Vancomycin + Zosyn   D/C vanc once if able to post IR procedure  - lactate 1 3, not trending       Heme: Trend H and H   //Elevated PT/INR:  Vit K 10 mg IV given 12/10     //DVT ppx:  resume 24 hours post IR procedure      Endo:   //DM: holding home metformin, on Insulin Sliding Scale while in hospital       //Hypothyroidism: Home levothyroxine       Msk/Skin: Frequent turning and pressure offloading     //OOB if possible   //PT/OT to see patient when able                 Disposition: Likely stepdown or med surg      Chief Complaint: Mild abdominal pain      HPI/24hr events:  Returned from IR appearing better  Systolic BP in 175V  IR did not find a hepatic abscess to drain, unable to aspirate purulent material  Tissue biopsies and cultures sent  Post op end points wnl  Lactate was not elevated, no Hg drop  Required 1 dose of 5 mg oxy for pain control

## 2017-12-11 NOTE — CASE MANAGEMENT
[address] [place] [address] in the [facility] by Arnold Ulrich for 2017  Network Utilization Review Department  Phone: [phone]; Fax [phone]  ATTENTION: The Network Utilization Review Department is now centralized for our 7 Facilities  Make a note that we have a new phone and fax numbers for our Department  Please call with any questions or concerns to [phone] and carefully follow the prompts so that you are directed to the right person  All voicemails are confidential  Fax any determinations, approvals, denials, and requests for initial or continue stay review clinical to [phone]  Due to HIGH CALL volume, it would be easier if you could please send faxed requests to expedite your requests and in part, help us provide discharge notifications faster  Initial Clinical Review    Admission: Date/Time/Statement: 12/10/17 @ 0145     Orders Placed This Encounter   Procedures    Inpatient Admission     Standing Status:   Standing     Number of Occurrences:   1     Order Specific Question:   Admitting Physician     Answer:   David Peoples [[id number]]     Order Specific Question:   Level of Care     Answer:   Med Surg [16]     Order Specific Question:   Estimated length of stay     Answer:   More than 2 Midnights     Order Specific Question:   Certification     Answer:   I certify that inpatient services are medically necessary for this patient for a duration of greater than two midnights  See H&P and MD Progress Notes for additional information about the patient's course of treatment           ED: Date/Time/Mode of Arrival:   ED Arrival Information     Expected Arrival Acuity Means of Arrival Escorted By Service Admission Type    - 12/9/2017 23:27 Urgent Ambulance [address] [place] [address] Medicine Urgent    Arrival Complaint    -          Chief Complaint:   Chief Complaint   Patient presents with    Fever - 9 weeks to 74 years     Per EMS " Family stated he became shaky and felt warm to touch  He was showing confusion  He had gallbladder removed three weeks ago  He has stents in place and is to be remove on the 12/22  " Healthy appearance  Answers questions appropriately       History of Illness: Anastacia Danielson is a 65 y o  male who presents with complaints of fever and not feeling well that began yesterday  Patient states he has been very shaky  Patient states he has been taking Tylenol but the fever keeps returning  Patient is visiting from [place] for a family wedding  Patient states that while at the wedding today he started to feel worse and was brought to the hospital via EMS  Patient status post cholecystectomy with stent placement on 10/25/17  Patient is scheduled for stent removal on 12/20/2017  Patient states he had an emergent cholecystectomy    Patient states he does have abdominal pain but denies nausea, vomiting, chest pain, shortness of breath        ED Vital Signs:   ED Triage Vitals [12/09/17 2334]   Temperature Pulse Respirations Blood Pressure SpO2   99 2 °F (37 3 °C) (!) 118 20 110/57 98 %      Temp Source Heart Rate Source Patient Position - Orthostatic VS BP Location FiO2 (%)   Oral Monitor Sitting Right arm --      Pain Score       4        Wt Readings from Last 1 Encounters:   12/10/17 65 4 kg (144 lb 2 9 oz)       Vital Signs (abnormal):   12/10/17 0011   103 1 °F (39 5 °C)     12/10/17 1038   101 8 °F (38 8 °C)  126 18 105/50 91 %   12/10/17 0828   97 4 °F (36 3 °C) 104 20 139/68 92 %   12/10/17 0730  97 6 °F (36 4 °C) 77 18 100/60 97 %   12/10/17 0300  97 8 °F (36 6 °C) 81 18 109/55 97 %   12/10/17 0215  99 5 °F (37 5 °C) -- -- -- --   12/10/17 0147  -- 91 20 97/55 95 %   12/10/17 0106  -- 101 20 107/56 96 %     Abnormal Labs/Diagnostic Test Results:   12/9: na 134   Cl 99   Bun 26   Creat 1 45   ast 64   Alb 3   t bili 1 2   Mg 1 5   Lactic acid 2 3  Wbc 22 62   hgb 11   hct 34   Bands 9   D dimer 1440     Pt 17   incr 1 34 ptt 39  bld c&s pending  EKG: sinus tach    12/10: VBG: vph 7 411   vpco2 37 3   vpo2 60 4   vhco3 23 2   voxyhgb 90 5  Na 134   Gluc 168, 146   Ca 7 8   Lactic acid 4 4  Wbc 20 93   hgb 10 1   hct 31  UA: sm bld   Tr prpot   1-2 rbc   occ epithelials  CXR: no active dz  CT a&p:1   Enhancing hepatic lesions as described above  Differential would include atypical hemangioma, hepatic neoplasm, either primary or metastatic or hepatic abscess, given the history of fever and recent gallbladder surgery  It is recommended the patient have a follow-up MRI of the liver with gadolinium, for further evaluation  2   Prostatomegaly with probable bladder outlet obstruction  Correlation with serum PSA  3   Colonic diverticulosi    MRI abd: 4 5 cm right hepatic lobe mass is indeterminate and restricts diffusion  Although abscess remains in the differential diagnosis, its imaging appearance slightly favor a solid mass  Needle sampling is necessary for further differentiation and therefore recommended    1 7 cm posterior right hepatic lobe lesion is difficult to characterize given its small size although has imaging features favoring cavernous hemangioma  I would recommend follow-up MRI in 3 months to ensure stability of this finding    1 7 x 1 2 cm indeterminate left adrenal nodule  This does not appear to represent a lipid rich adenoma although could represent a lipid poor adenoma  This can be reevaluated for stability at time of follow-up above      ED Treatment:   Medication Administration from 12/09/2017 2327 to 12/10/2017 0251       Date/Time Order Dose Route Action Action by Comments     12/10/2017 0008 sodium chloride 0 9 % bolus 1,000 mL 1,000 mL Intravenous Florian 37 Berlin Harris RN      12/10/2017 0034 acetaminophen (TYLENOL) tablet 650 mg 650 mg Oral Given Berlin Harris RN      12/10/2017 0034 ketorolac (TORADOL) injection 30 mg 30 mg Intravenous Given Berlin Harris RN      12/10/2017 0104 cefTRIAXone (ROCEPHIN) IVPB (premix) 1,000 mg 1,000 mg Intravenous New Bag Karina Riddle RN      12/10/2017 0145 vancomycin (VANCOCIN) IVPB (premix) 1,000 mg 1,000 mg Intravenous New Bag Mckinley Strange RN           Past Medical/Surgical History: Active Ambulatory Problems     Diagnosis Date Noted    BPH (benign prostatic hyperplasia) 12/10/2017    [facility] 12/10/2017    Cholelithiasis with biliary obstruction S/P Cholecystectomy 6 weeks ago 12/10/2017    Lactic acid acidosis 12/10/2017    Anemia 12/10/2017    Leukocytosis with bandemia 12/10/2017     Resolved Ambulatory Problems     Diagnosis Date Noted    No Resolved Ambulatory Problems     Past Medical History:   Diagnosis Date    BPH (benign prostatic hyperplasia)     Diabetes mellitus ([facility] )     Hypertension     Hypothyroidism        Admitting Diagnosis: Fever [R50 9]  Hepatic lesion [K76 9]  Sepsis ([facility] ) [A41 9]    Age/Sex: 65 y o  male    Assessment/Plan: Principal Problem:  Sepsis ([facility] )  Active Problems:    DM (diabetes mellitus) ([facility] )    HTN (hypertension)    ALDEN (acute kidney injury) (HCC)    Plan for the Primary Problem(s):  · Sepsis  ? Admit  ? Trend lactic acid  ? IV fluids  ? IV antibiotics, cefepime and vancomycin  ? Liver ultrasound pending  ? Consult GI  ?  Etiology unclear, but suspicious of possible hepatic abscess based on CT findings and recent cholecystectomy with stent placement      Plan for Additional Problems:   · Diabetes - hemoglobin A1c pending, insulin sliding scale  · Hypertension - will monitor, holding lisinopril secondary to acute kidney injury  · Acute kidney injury - IV fluids, recheck creatinine, avoid nephrotoxic drugs     VTE Prophylaxis: Heparin  / sequential compression device   Code Status:  Full  POLST: There is no POLST form on file for this patient (pre-hospital)     Anticipated Length of Stay:  Patient will be admitted on an  inpatient  basis with an anticipated length of stay of  more than 2 midnights  Justification for Hospital Stay:  IV antibiotics    Attestation signed by Mariela Royal MD at 12/10/2017 11:17 AM       Split/Shared Statement  I saw/examined the patient  I agree with the Advanced Practitioner's note with the following additions/exceptions: Called to see patient with shaking chills  Patient awake and alert but with rigor  Fluids increased to 100 ml, and lactic acid sent stat, Blood cultures x 2 ordered  Lactic returned 4 4  Severe sepsis protocol activated with initiation of Fluid boluses and serial lactic acid  CT from last night showed potential for liver abcess which is highly likely since patient recently had cholecystectomy with common bile duct stent  He was due to have stent removed on return to [place]  I reviewed this case with ICU attending who agrees with increase in level of care  We have no ICU availablity on campus and patient will likely at minimum may need IR for intervention and drainage vs surgery   Patient will need transfer to [place]  Reviewed case with Dr [employer] who accepts patient in transfer to medical ICU  Will get MRI stat prior to transfer   Tele started      [facility]  Creatinine improved this am  Continue fluids   Ok to give gadolinium at this time         Exam: VS  101 8   Resp 18  105/50 91 % MAP 76     General : Rigor, bed shaking     Patient awake alert very pleasant  Normocephalic atraumatic pupils equal round and reactive to light extraocular muscles are intact mucous mucous membranes are moist neck is supple there is no JVD no lymph nodes no carotid bruits chest is decreased but clear to auscultation is no rhonchi rales or wheezes  Cardiovascular regular rate rhythm positive S1 and S2 no S3-S4 murmur or gallop  Abdomen soft nontender nondistended with positive bowel sounds no hepatosplenomegaly no guarding or rebound, he does not appear to have a full bladder  Extremities no clubbing cyanosis edema neurologically awake alert oriented cranial nerves 2-12 appear to be intact     Laboratories were reviewed patient with increased lactic acid the signifying worsening sepsis  Patient qualifies for severe sepsis and we transfer to the ICU at [facility] for intervention  For now will give IV fluid boluses repeat lactic acid in Q 2 hours and obtain MRCP to guide therapy at our other facil         Admission Orders:    PO tylenol x 1  IV cefepime q12h  Peridex rinse q12h  SQ heparin q8h  Gluc checks ac/  Hs  IV toradol prn (x 1)  PO levothyroxine daily  PO phytonadione x 1  IV zosyn q6h  NS 1li bolus x 4  PO flomax daily  IV vanco x 2  /hr  IV fentanyl x 1  IV versed x 1  IV zofran prn  SCD's  Lactic acid q2h  Cons GI  Diabetic diet  Cbc, bmp in am  US liver  Up w/assist    PER MED DC SUMMARY 12/10  Subjective:  Patient found this morning to be shaking chills, +nausea  Discharge Diagnoses:    Principal Problem:  ·   Severe sepsis [facility]): patient with increasing lactic acid this am and rigor  CT shows possible liver abcess versus hemangioma  Patient being sent for MRI stat  Inpatient sepsis protocol initiated  I spoke with ID will change Cefepime to zosyn and keep Vanco for now  Patient has a retained common bile duct stent that he was to have removed on return to [place]  Patient being transferred to MICU in [facility] for closer monitoring and possible drainage if this is an abcess  Significant Findings / Test Results:    · Possible liver abscess  · Discharging creatinine improved to 1   29 down from 1 45  · White count initially 22 62 down to 20 93  · Hemoglobin 10 1  · Lactic acid trending from 2 3-1 0, to 4 4 this a m  Amaris Brown Course:    Miki Aguilar is a 65 y o  male patient who originally presented to the hospital on 12/9/2017 due to altered mental status and fever    Patient evidently had a cholecystectomy with complications requiring placement of common bile duct stent back in October 25  He was to have the stent removed on December 20, 2017  Evidently the cholecystectomy was emergent  The patient came to [place] for a wedding and while here started to feel shaky and have fever  He started to take Tylenol but did not have improvement in the fever  His wife states he was actually confused  He came to the emergency room at the urging guests who were physicians at the wedding  He was found to have sepsis related to a possible liver abscess noted on CT scan  This morning the patient developed poor eager stat lactic acid was sent showing worsening condition  I communicated with Infectious Disease and adjusted his antibiotics  I initiated severe sepsis protocol initiated fluid boluses  We reviewed the case with ICU  Patient will need to be transferred to [facility] for further care due to no ICU beds and the likelihood of needing drainage through an interventional radiologist which would not be available here today  At the time of transfer the patient is awake alert and oriented rigors have continue discontinued  We are obtain MRI to help guide therapy  I have reviewed the case with Nephrology to assure that it would be safe to give him gadolinium at this time his renal function is reasonable  We will need to do a he retention protocol on him however as it does appear that he has prostatomegaly and may have urinary output put issues    Condition at Discharge: fair   Disposition: [facility] Transfer to [facility]    Gastroenterology [address] [place] [address] - Clinical Indications for Admission to Inpatient Care by Ying Youssef RN        Met:  Reviewed on 12/11/2017 by Ying Youssef RN        Created Using Review Status Review Entered   Viri Montalvo Completed 12/11/2017 11:29      Criteria Set Name - Subset   Gastroenterology [address] [place] [address] - Clinical Indications for Admission to Inpatient Care      Criteria Review      Clinical Indications for Admission to Inpatient Care Most Recent : Debbie Mondragon Most Recent Date: 12/11/2017 11:29 AM EST   (X) Hospital admission is needed for appropriate care of the patient because of 1 or more of the    following :      (X) Suspected acute intra-abdominal process indicated by 1 or more of the following  (1) (2) (3)       (4) (5):         (X) Hemodynamic instability         12/11/2017 11:29 AM EST by Debbie Mondragon            after 4li NS infused; lactic acid 4 4         (X) Severe liver disease indicated by 1 or more of the following  (15) (16) (21) (22) (23) (24)       (25) (26) (27) (28):         (X) Hepatic abscess       ==================================================    PLEASE NOTE, THE BELOW REVIEW IS FROM THE [facility]) [facility]   UNABLE TO REMOVE FROM THE FAX      ==================================================

## 2017-12-11 NOTE — BRIEF OP NOTE (RAD/CATH)
CT guided drainage Procedure Note    PATIENT NAME: Edward Goodman  : 1940  MRN: [id number]     Pre-op Diagnosis:   1  DM (diabetes mellitus) ([facility] )    2  Hepatic lesion      Post-op Diagnosis:   1  DM (diabetes mellitus) ([facility] )    2  Hepatic lesion        Surgeon:   Salina Osorio MD  Assistants:     No qualified resident was available, Resident is only observing    Estimated Blood Loss: Minimal  Findings: Attempted aspiration/drainage of right hepatic lesion did not yield fluid suggesting this is a solid mass   Therefore, a core biopsy was obtained for pathology    Specimens: 18 gauge core biopsy for pathology and culture    Complications:  none    Anesthesia: Conscious sedation and Local    Salina Osorio MD     Date: 12/10/2017  Time: 8:17 PM

## 2017-12-11 NOTE — PLAN OF CARE
Problem: DISCHARGE PLANNING - CARE MANAGEMENT  Goal: Discharge to post-acute care or home with appropriate resources  INTERVENTIONS:  - Conduct assessment to determine patient/family and health care team treatment goals, and need for post-acute services based on payer coverage, community resources, and patient preferences, and barriers to discharge  - Address psychosocial, clinical, and financial barriers to discharge as identified in assessment in conjunction with the patient/family and health care team  - Arrange appropriate level of post-acute services according to patient's   needs and preference and payer coverage in collaboration with the physician and health care team  - Communicate with and update the patient/family, physician, and health care team regarding progress on the discharge plan  - Arrange appropriate transportation to post-acute venues  - Pt to d/c home with appropriate resources when medically stable    Outcome: Progressing

## 2017-12-12 PROBLEM — R16.0 LIVER MASS: Status: ACTIVE | Noted: 2017-12-10

## 2017-12-12 PROBLEM — E87.2 LACTIC ACID ACIDOSIS: Status: RESOLVED | Noted: 2017-12-10 | Resolved: 2017-12-12

## 2017-12-12 LAB
ALBUMIN SERPL BCP-MCNC: 2.1 G/DL (ref 3.5–5)
ALP SERPL-CCNC: 95 U/L (ref 46–116)
ALT SERPL W P-5'-P-CCNC: 106 U/L (ref 12–78)
ANION GAP SERPL CALCULATED.3IONS-SCNC: 8 MMOL/L (ref 4–13)
AST SERPL W P-5'-P-CCNC: 45 U/L (ref 5–45)
BILIRUB SERPL-MCNC: 0.9 MG/DL (ref 0.2–1)
BUN SERPL-MCNC: 18 MG/DL (ref 5–25)
CALCIUM SERPL-MCNC: 7.5 MG/DL (ref 8.3–10.1)
CHLORIDE SERPL-SCNC: 106 MMOL/L (ref 100–108)
CO2 SERPL-SCNC: 21 MMOL/L (ref 21–32)
CREAT SERPL-MCNC: 1.06 MG/DL (ref 0.6–1.3)
ERYTHROCYTE [DISTWIDTH] IN BLOOD BY AUTOMATED COUNT: 14.4 % (ref 11.6–15.1)
GFR SERPL CREATININE-BSD FRML MDRD: 67 ML/MIN/1.73SQ M
GLUCOSE SERPL-MCNC: 130 MG/DL (ref 65–140)
GLUCOSE SERPL-MCNC: 143 MG/DL (ref 65–140)
GLUCOSE SERPL-MCNC: 185 MG/DL (ref 65–140)
GLUCOSE SERPL-MCNC: 198 MG/DL (ref 65–140)
GLUCOSE SERPL-MCNC: 201 MG/DL (ref 65–140)
HCT VFR BLD AUTO: 31.7 % (ref 36.5–49.3)
HGB BLD-MCNC: 10.9 G/DL (ref 12–17)
MCH RBC QN AUTO: 27.2 PG (ref 26.8–34.3)
MCHC RBC AUTO-ENTMCNC: 34.4 G/DL (ref 31.4–37.4)
MCV RBC AUTO: 79 FL (ref 82–98)
PLATELET # BLD AUTO: 182 THOUSANDS/UL (ref 149–390)
PMV BLD AUTO: 11.4 FL (ref 8.9–12.7)
POTASSIUM SERPL-SCNC: 3.2 MMOL/L (ref 3.5–5.3)
PROT SERPL-MCNC: 5.8 G/DL (ref 6.4–8.2)
RBC # BLD AUTO: 4.01 MILLION/UL (ref 3.88–5.62)
SODIUM SERPL-SCNC: 135 MMOL/L (ref 136–145)
WBC # BLD AUTO: 15.71 THOUSAND/UL (ref 4.31–10.16)

## 2017-12-12 PROCEDURE — 82948 REAGENT STRIP/BLOOD GLUCOSE: CPT

## 2017-12-12 PROCEDURE — 85027 COMPLETE CBC AUTOMATED: CPT | Performed by: PHYSICIAN ASSISTANT

## 2017-12-12 PROCEDURE — 80053 COMPREHEN METABOLIC PANEL: CPT | Performed by: PHYSICIAN ASSISTANT

## 2017-12-12 RX ADMIN — Medication 3.38 G: at 05:10

## 2017-12-12 RX ADMIN — ACETAMINOPHEN 650 MG: 325 TABLET, FILM COATED ORAL at 01:43

## 2017-12-12 RX ADMIN — Medication 3.38 G: at 11:56

## 2017-12-12 RX ADMIN — CEFTRIAXONE 2000 MG: 2 INJECTION, SOLUTION INTRAVENOUS at 16:53

## 2017-12-12 RX ADMIN — INSULIN LISPRO 1 UNITS: 100 INJECTION, SOLUTION INTRAVENOUS; SUBCUTANEOUS at 22:08

## 2017-12-12 RX ADMIN — ACETAMINOPHEN 650 MG: 325 TABLET, FILM COATED ORAL at 12:08

## 2017-12-12 RX ADMIN — ACETAMINOPHEN 650 MG: 325 TABLET, FILM COATED ORAL at 22:09

## 2017-12-12 RX ADMIN — ONDANSETRON 4 MG: 2 INJECTION INTRAMUSCULAR; INTRAVENOUS at 19:42

## 2017-12-12 RX ADMIN — LEVOTHYROXINE SODIUM 50 MCG: 50 TABLET ORAL at 05:09

## 2017-12-12 RX ADMIN — OXYCODONE HYDROCHLORIDE 5 MG: 5 TABLET ORAL at 01:44

## 2017-12-12 RX ADMIN — Medication 500 MG: at 19:32

## 2017-12-12 RX ADMIN — ENOXAPARIN SODIUM 40 MG: 40 INJECTION SUBCUTANEOUS at 08:00

## 2017-12-12 RX ADMIN — TAMSULOSIN HYDROCHLORIDE 0.4 MG: 0.4 CAPSULE ORAL at 16:51

## 2017-12-12 NOTE — ASSESSMENT & PLAN NOTE
· POA  · Unclear source, initially felt secondary to liver abscess, but was found to be solid    · Blood cultures negative thus far, CXR clear, UA unremarkable, ?viral  · Still febrile with leukocytosis  · Will ask for ID consultation  · Continue Zosyn for now  · PCP in [place] Dr Gali Barillas [phone]

## 2017-12-12 NOTE — ASSESSMENT & PLAN NOTE
· S/p biopsy with IR  · Will need follow up with GI specialist in [place] Dr Alicia Lowe [phone]

## 2017-12-12 NOTE — PROGRESS NOTES
Progress Note - Maricruz Hughes 1940, 65 y o  male MRN: [id number]    Unit/Bed#: -01 Encounter: [id number]    Primary Care Provider: No primary care provider on file  Date and time admitted to hospital: 12/10/2017  2:29 PM        * Severe sepsis ([facility] )   Assessment & Plan    · POA  · Unclear source, initially felt secondary to liver abscess, but was found to be solid  · Blood cultures negative thus far, CXR clear, UA unremarkable, ?viral  · Still febrile with leukocytosis  · Will ask for ID consultation  · Continue Zosyn for now  · PCP in [place] Dr Megha Hernandez [phone]        Liver mass   Assessment & Plan    · S/p biopsy with IR  · Will need follow up with GI specialist in [place] Dr Maday Banks [phone]        Cholelithiasis with biliary obstruction S/P Cholecystectomy 6 weeks ago   Assessment & Plan    · Will need GI follow up upon returning home        Anemia   Assessment & Plan    · stable        Type 2 diabetes mellitus with hyperglycemia ([facility] )   Assessment & Plan    · Metformin on hold  · Continue correctional insulin for now        ALDEN (acute kidney injury) ([facility] )   Assessment & Plan    · POA and likely secondary to sepsis and hypotension  · Improved  · Monitor back on zestril        HTN (hypertension)   Assessment & Plan    · Initially hypotensive, but now stable  · Continue zestril          VTE Pharmacologic Prophylaxis:   Pharmacologic: Enoxaparin (Lovenox)  Mechanical VTE Prophylaxis in Place: Yes    Patient Centered Rounds: I have performed bedside rounds with nursing staff today  Discussions with Specialists or Other Care Team Provider:     Education and Discussions with Family / Patient: patient  Wife updated at bedside  Spoke to daughter and family friend over the phone, spoke with brother over phone    Time Spent for Care: 45 minutes  More than 50% of total time spent on counseling and coordination of care as described above   Time spent reviewing records, examining patient, and discussing treatment plan with patient and family    Current Length of Stay: 2 day(s)    Current Patient Status: Inpatient   Certification Statement: The patient will continue to require additional inpatient hospital stay due to fever    Discharge Plan: await clinical coarse  Resides in [place] and is here visiting family    Code Status: Level 1 - Full Code      Subjective: Intermittent chills  Some RUQ tenderness at times  Ambulated today without difficulty    Objective:     Vitals:   Temp (24hrs), Av 2 °F (37 3 °C), Min:97 5 °F (36 4 °C), Max:100 4 °F (38 °C)    HR:  [] 85  Resp:  [16-32] 18  BP: ()/(48-83) 147/79  SpO2:  [91 %-100 %] 95 %  Body mass index is 22 46 kg/m²  Input and Output Summary (last 24 hours): Intake/Output Summary (Last 24 hours) at 17 1338  Last data filed at 17 0819   Gross per 24 hour   Intake              240 ml   Output             1000 ml   Net             -760 ml       Physical Exam:     Physical Exam   Constitutional: He is oriented to person, place, and time  He appears well-developed  No distress  HENT:   Head: Normocephalic and atraumatic  Neck: Normal range of motion  Neck supple  Cardiovascular: Normal rate and regular rhythm  No murmur heard  Pulmonary/Chest: Effort normal and breath sounds normal  No respiratory distress  He has no wheezes  He has no rales  Abdominal: Soft  Bowel sounds are normal  He exhibits no distension  There is tenderness (RUQ tenderness to palpation)  Musculoskeletal: He exhibits no edema  Neurological: He is alert and oriented to person, place, and time  No cranial nerve deficit  Skin: Skin is warm and dry  No rash noted  Psychiatric: He has a normal mood and affect         Additional Data:     Labs:      Results from last 7 days  Lab Units 17  0555 12/10/17  2341   WBC Thousand/uL 19 68* 21 27*   HEMOGLOBIN g/dL 10 0* 10 0*   HEMATOCRIT % 29 7* 30 3*   PLATELETS Thousands/uL 142* 135*   NEUTROS PCT %  --  89*   LYMPHS PCT %  --  6*   LYMPHO PCT % 3*  --    MONOS PCT %  --  5   MONO PCT MAN % 4  --    EOS PCT %  --  0   EOSINO PCT MANUAL % 0  --        Results from last 7 days  Lab Units 12/11/17  0555   SODIUM mmol/L 139   POTASSIUM mmol/L 3 8   CHLORIDE mmol/L 110*   CO2 mmol/L 20*   BUN mg/dL 27*   CREATININE mg/dL 1 11   CALCIUM mg/dL 7 0*   TOTAL PROTEIN g/dL 5 4*   BILIRUBIN TOTAL mg/dL 0 97   ALK PHOS U/L 72   ALT U/L 155*   AST U/L 140*   GLUCOSE RANDOM mg/dL 95       Results from last 7 days  Lab Units 12/09/17  2354   INR  1 34*       * I Have Reviewed All Lab Data Listed Above  * Additional Pertinent Lab Tests Reviewed: [facility] Admission Reviewed    Imaging:    Imaging Reports Reviewed Today Include: MRI,CT,CXR  Imaging Personally Reviewed by Myself Includes:  none    Recent Cultures (last 7 days):       Results from last 7 days  Lab Units 12/10/17  2030 12/10/17  1202 12/10/17  0917 12/09/17  2354   BLOOD CULTURE   --  No Growth at 24 hrs  No Growth at 48 hrs  No Growth at 48 hrs  No Growth at 48 hrs  GRAM STAIN RESULT  No Polys or Bacteria seen  --   --   --    BODY FLUID CULTURE, STERILE  No growth  --   --   --        Last 24 Hours Medication List:     enoxaparin 40 mg Subcutaneous Q24H [address]   insulin lispro 1-5 Units Subcutaneous TID AC   insulin lispro 1-5 Units Subcutaneous HS   levothyroxine 50 mcg Oral Early Morning   piperacillin-tazobactam 3 375 g Intravenous Q6H [address]   tamsulosin 0 4 mg Oral Daily With Dinner        Today, Patient Was Seen By: Tess Carty PA-C    ** Please Note: Dictation voice to text software may have been used in the creation of this document   ** Yes

## 2017-12-12 NOTE — CONSULTS
Consultation - Infectious Disease   Eulogio Hunter 65 y o  male MRN: [id number]  Unit/Bed#: -01 Encounter: [id number]      IMPRESSION & RECOMMENDATIONS:   1  Severe sepsis-POA at [place]  Fever, leukocytosis, elevated lactate  A definitive source has not been yet found, however I suspect is related to the patient's hypodense liver lesion  No other clear sources appreciated  The patient does have some prostatomegaly that could suggest urinary retention with UTI, however he completely denies any urinary symptomatology at this time  Unfortunately, no urinalysis was sent on the time of the original admission  Thus far the patient's blood cultures have remained negative and he has become hemodynamically stable and nontoxic  He seems to be tolerating the antibiotics without difficulty  At this point is the patient is significantly improved, it seems reasonable to narrow the spectrum of antibiotics while waiting tissue all data in anticipation of a long-term plan   -discontinue Zosyn  -ceftriaxone 2 g IV Q 24 hours  -follow-up blood cultures and liver biopsy cultures and adjust antibiotics as needed  -monitor CBC with diff and CMP  -supportive care  -additional workup if sepsis persist without a source    2  Hypodense liver mass-unclear etiology  With the patient having no liver mass back in late October, it is unlikely that this represents of malignancy  This more likely represents an inflammatory/infectious process like a phlegmon that has yet to become liquified into an abscess  No other clear source is appreciated  -follow up liver biopsy including cultures and pathology  -antibiotics as above for now  -may need additional imaging to see if this hypodensity becomes more liquified  -no additional ID workup for now    3  Acute kidney injury-likely secondary to sepsis with hypotension  Pre renal issues may have significantly played a role  The renal function has improved    -monitor the GFR  -no need to dose adjust ceftriaxone for renal function  -no additional ID workup for now    4  Recent cholecystitis/cholangitis-with biliary obstruction  Status post ERCP with stenting  Possibly all related to 1  Transaminases are elevated however there does not appear to be an obstructive pattern, and imaging does not reveal any ongoing obstruction  -monitor liver function test  -no additional ID workup for now  -recommend GI evaluation if the liver function tests would remain elevated and no other source of sepsis is found    5  Diabetes mellitus-type 2 with hyperglycemia    HISTORY OF PRESENT ILLNESS:  Reason for Consult:  Sepsis  HPI: Blanca Barahona is a 65y o  year old male admitted to [facility] in [place] after being transferred from [facility] for management of high fever, shaking chills, and an abnormal liver lesion who I am asked to assist with management  Patient has a history of a recent cholecystectomy with biliary stent placement back in late October this year  At that time he reportedly had a normal MRI of the liver  He had been feeling some malaise last week but otherwise was relatively stable  He had traveled from [place] where he lives, for a family wedding late last week and during that time the malaise persisted  This past Friday night the patient had a bout of fever and shaking chills  The following morning he felt quite fatigued and cold but was able to go to the wedding that day  By Saturday night his shaking chills became much more persistent and he apparently became more lethargic and confused  He was therefore brought to [facility] for further evaluation  He apparently developed some right-sided abdominal pain that day and underwent CT of the abdomen and pelvis that revealed a hypodense mass in the liver of unclear significance  On presentation he was found to have high fever with an elevated lactate level    He had blood cultures obtained and was started on vancomycin and ceftriaxone and admitted for further management  He was transferred to [facility] in [place] for biopsy/drainage of the hypodense liver lesion  He was initially admitted to the intensive care unit due to a soft blood pressure  His antibiotics were broadened to vancomycin and Zosyn  He underwent percutaneous biopsy of the liver mass yesterday and the mass was found to be solid  Therefore no drain was placed or drainage could be accomplished  The core biopsy was sent for pathology and culture  Over the last 24 hours the patient's temperature has come down and he has remained hemodynamically stable  He has been transferred out of the intensive care unit  He initially had a brisk leukocytosis which is resolving  He is no longer having the shaking chills but has significant malaise and according to his wife he has not cognitively back to baseline  He denies any headache or stiff neck, denies any sore throat or rhinorrhea or nasal congestion, denies any cough or difficulty breathing, denies any chest pain or current abdominal pain, denies any dysuria or hematuria, denies any new rash or skin lesions, denies any new joint or muscle pains  Of note the patient did have some back pain when he initially had his rigors  Denies having any difficulty with urine output  REVIEW OF SYSTEMS:  A complete 12 point system-based review of systems is otherwise negative      PAST MEDICAL HISTORY:  Past Medical History:   Diagnosis Date    BPH (benign prostatic hyperplasia)     Diabetes mellitus ([facility] [address] )     Hypertension     Hypothyroidism      Past Surgical History:   Procedure Laterality Date    APPENDECTOMY      CHOLECYSTECTOMY      biliary stent placement       FAMILY HISTORY:  Non-contributory    SOCIAL HISTORY:  Social History   History   Alcohol Use    Yes     Comment: occ     History   Drug Use No     History   Smoking Status    Former Smoker    Packs/day: 0 50    Years: 10 00  Types: Cigarettes    Start date:     Quit date:    Smokeless Tobacco    Never Used       ALLERGIES:  Allergies   Allergen Reactions    Shellfish-Derived Products Anaphylaxis       MEDICATIONS:  All current active medications have been reviewed  Antibiotics:  Zosyn 3 , antibiotics 4  PHYSICAL EXAM:  HR:  [] 85  Resp:  [16-18] 18  BP: ()/(48-83) 147/79  SpO2:  [91 %-95 %] 95 %  Temp (24hrs), Av 2 °F (37 3 °C), Min:97 5 °F (36 4 °C), Max:100 4 °F (38 °C)  Current: Temperature: 99 6 °F (37 6 °C)    Intake/Output Summary (Last 24 hours) at 17 1439  Last data filed at 17 3539   Gross per 24 hour   Intake              240 ml   Output             1000 ml   Net             -760 ml       General Appearance:  Appearing nontoxic, and in no distress   Head:  Normocephalic, without obvious abnormality, atraumatic   Eyes:  Conjunctiva pink and sclera anicteric, both eyes   Nose: Nares normal, mucosa normal, no drainage   Throat: Oropharynx moist without lesions   Neck: Supple, symmetrical, no adenopathy, no tenderness/mass/nodules   Back:   Symmetric, no curvature, ROM normal, no CVA tenderness   Lungs:   Decreased breath sounds bilaterally, respirations unlabored   Chest Wall:  No tenderness or deformity   Heart:  RRR; no murmur, rub or gallop   Abdomen:   Soft, non-tender, non-distended, positive bowel sounds,    Extremities: No cyanosis, clubbing or edema   Skin: No rashes or lesions  No draining wounds noted  Lymph nodes: Cervical, supraclavicular nodes normal   Neurologic: Alert and oriented times 3, extremity strength 5/5 and symmetric       LABS, IMAGING, & OTHER STUDIES:  Lab Results:  I have personally reviewed pertinent labs      Results from last 7 days  Lab Units 17  1425 17  0555 12/10/17  2341   WBC Thousand/uL 15 71* 19 68* 21 27*   HEMOGLOBIN g/dL 10 9* 10 0* 10 0*   PLATELETS Thousands/uL 182 142* 135*       Results from last 7 days  Lab Units 12/11/17  0555 12/10/17  2341 12/10/17  0509 12/09/17  2354   SODIUM mmol/L 139 139 134* 134*   POTASSIUM mmol/L 3 8 4 0 3 5 3 7   CHLORIDE mmol/L 110* 109* 101 99*   CO2 mmol/L 20* 22 25 24   ANION GAP mmol/L 9 8 8 11   BUN mg/dL 27* 26* 23 26*   CREATININE mg/dL 1 11 1 27 1 29 1 45*   EGFR ml/min/1 73sq m 63 54 53 46   GLUCOSE RANDOM mg/dL 95 113 168* 157*   CALCIUM mg/dL 7 0* 6 8* 7 8* 8 3   AST U/L 140*  --   --  64*   ALT U/L 155*  --   --  73   ALK PHOS U/L 72  --   --  72   TOTAL PROTEIN g/dL 5 4*  --   --  6 6   ALBUMIN g/dL 2 1*  --   --  3 0*   BILIRUBIN TOTAL mg/dL 0 97  --   --  1 20*       Results from last 7 days  Lab Units 12/10/17  2030 12/10/17  1202 12/10/17  0917 12/09/17  2354   BLOOD CULTURE   --  No Growth at 24 hrs  No Growth at 48 hrs  No Growth at 48 hrs  No Growth at 48 hrs  GRAM STAIN RESULT  No Polys or Bacteria seen  --   --   --    BODY FLUID CULTURE, STERILE  No growth  --   --   --        Imaging Studies:   I have personally reviewed pertinent imaging study reports and images in PACS  MRI abdomen-4 5 cm right hepatic lobe mass is indeterminate and restricts diffusion   Although abscess remains in the differential diagnosis, its imaging appearance slightly favor a solid mass   Needle sampling is necessary for further differentiation and therefore   recommended  1 7 cm posterior right hepatic lobe lesion is difficult to characterize given its small size although has imaging features favoring cavernous hemangioma   I would recommend follow-up MRI in 3 months to ensure stability of this finding  1 7 x 1 2 cm indeterminate left adrenal nodule   This does not appear to represent a lipid rich adenoma although could represent a lipid poor adenoma   This can be reevaluated for stability at time of follow-up above        CT abdomen and pelvis-1   Enhancing hepatic lesions as described above   Differential would include atypical hemangioma, hepatic neoplasm, either primary or metastatic or hepatic abscess, given the history of fever and recent gallbladder surgery   It is recommended the   patient have a follow-up MRI of the liver with gadolinium, for further evaluation  2   Prostatomegaly with probable bladder outlet obstruction   Correlation with serum PSA  3   Colonic diverticulosis

## 2017-12-13 PROBLEM — E87.6 HYPOKALEMIA: Status: ACTIVE | Noted: 2017-12-13

## 2017-12-13 LAB
ALBUMIN SERPL BCP-MCNC: 2.4 G/DL (ref 3.5–5)
ALP SERPL-CCNC: 139 U/L (ref 46–116)
ALT SERPL W P-5'-P-CCNC: 105 U/L (ref 12–78)
ANION GAP SERPL CALCULATED.3IONS-SCNC: 9 MMOL/L (ref 4–13)
AST SERPL W P-5'-P-CCNC: 36 U/L (ref 5–45)
BASOPHILS # BLD AUTO: 0.03 THOUSANDS/ΜL (ref 0–0.1)
BASOPHILS NFR BLD AUTO: 0 % (ref 0–1)
BILIRUB SERPL-MCNC: 0.96 MG/DL (ref 0.2–1)
BUN SERPL-MCNC: 13 MG/DL (ref 5–25)
CALCIUM SERPL-MCNC: 8.8 MG/DL (ref 8.3–10.1)
CHLORIDE SERPL-SCNC: 104 MMOL/L (ref 100–108)
CO2 SERPL-SCNC: 24 MMOL/L (ref 21–32)
CREAT SERPL-MCNC: 1.05 MG/DL (ref 0.6–1.3)
EOSINOPHIL # BLD AUTO: 0.14 THOUSAND/ΜL (ref 0–0.61)
EOSINOPHIL NFR BLD AUTO: 1 % (ref 0–6)
ERYTHROCYTE [DISTWIDTH] IN BLOOD BY AUTOMATED COUNT: 14.4 % (ref 11.6–15.1)
GFR SERPL CREATININE-BSD FRML MDRD: 68 ML/MIN/1.73SQ M
GLUCOSE SERPL-MCNC: 118 MG/DL (ref 65–140)
GLUCOSE SERPL-MCNC: 130 MG/DL (ref 65–140)
GLUCOSE SERPL-MCNC: 140 MG/DL (ref 65–140)
GLUCOSE SERPL-MCNC: 143 MG/DL (ref 65–140)
GLUCOSE SERPL-MCNC: 153 MG/DL (ref 65–140)
HCT VFR BLD AUTO: 37.4 % (ref 36.5–49.3)
HGB BLD-MCNC: 12.7 G/DL (ref 12–17)
LYMPHOCYTES # BLD AUTO: 1.76 THOUSANDS/ΜL (ref 0.6–4.47)
LYMPHOCYTES NFR BLD AUTO: 10 % (ref 14–44)
MCH RBC QN AUTO: 27.3 PG (ref 26.8–34.3)
MCHC RBC AUTO-ENTMCNC: 34 G/DL (ref 31.4–37.4)
MCV RBC AUTO: 80 FL (ref 82–98)
MONOCYTES # BLD AUTO: 1.55 THOUSAND/ΜL (ref 0.17–1.22)
MONOCYTES NFR BLD AUTO: 9 % (ref 4–12)
NEUTROPHILS # BLD AUTO: 13.46 THOUSANDS/ΜL (ref 1.85–7.62)
NEUTS SEG NFR BLD AUTO: 80 % (ref 43–75)
NRBC BLD AUTO-RTO: 0 /100 WBCS
PLATELET # BLD AUTO: 231 THOUSANDS/UL (ref 149–390)
PMV BLD AUTO: 11.9 FL (ref 8.9–12.7)
POTASSIUM SERPL-SCNC: 3.1 MMOL/L (ref 3.5–5.3)
PROT SERPL-MCNC: 7.2 G/DL (ref 6.4–8.2)
RBC # BLD AUTO: 4.66 MILLION/UL (ref 3.88–5.62)
SODIUM SERPL-SCNC: 137 MMOL/L (ref 136–145)
WBC # BLD AUTO: 17.06 THOUSAND/UL (ref 4.31–10.16)

## 2017-12-13 PROCEDURE — 80053 COMPREHEN METABOLIC PANEL: CPT | Performed by: PHYSICIAN ASSISTANT

## 2017-12-13 PROCEDURE — G8978 MOBILITY CURRENT STATUS: HCPCS

## 2017-12-13 PROCEDURE — 82948 REAGENT STRIP/BLOOD GLUCOSE: CPT

## 2017-12-13 PROCEDURE — G8979 MOBILITY GOAL STATUS: HCPCS

## 2017-12-13 PROCEDURE — 85025 COMPLETE CBC W/AUTO DIFF WBC: CPT | Performed by: INTERNAL MEDICINE

## 2017-12-13 PROCEDURE — 97162 PT EVAL MOD COMPLEX 30 MIN: CPT

## 2017-12-13 RX ORDER — LISINOPRIL 10 MG/1
10 TABLET ORAL EVERY 12 HOURS SCHEDULED
Status: DISCONTINUED | OUTPATIENT
Start: 2017-12-13 | End: 2017-12-22 | Stop reason: HOSPADM

## 2017-12-13 RX ORDER — POTASSIUM CHLORIDE 20 MEQ/1
40 TABLET, EXTENDED RELEASE ORAL 2 TIMES DAILY
Status: COMPLETED | OUTPATIENT
Start: 2017-12-13 | End: 2017-12-13

## 2017-12-13 RX ADMIN — ENOXAPARIN SODIUM 40 MG: 40 INJECTION SUBCUTANEOUS at 09:38

## 2017-12-13 RX ADMIN — LISINOPRIL 10 MG: 10 TABLET ORAL at 21:39

## 2017-12-13 RX ADMIN — CEFTRIAXONE 2000 MG: 2 INJECTION, SOLUTION INTRAVENOUS at 16:17

## 2017-12-13 RX ADMIN — TAMSULOSIN HYDROCHLORIDE 0.4 MG: 0.4 CAPSULE ORAL at 16:16

## 2017-12-13 RX ADMIN — ACETAMINOPHEN 650 MG: 325 TABLET, FILM COATED ORAL at 09:49

## 2017-12-13 RX ADMIN — POTASSIUM CHLORIDE 40 MEQ: 1500 TABLET, EXTENDED RELEASE ORAL at 11:43

## 2017-12-13 RX ADMIN — INSULIN LISPRO 1 UNITS: 100 INJECTION, SOLUTION INTRAVENOUS; SUBCUTANEOUS at 21:37

## 2017-12-13 RX ADMIN — ONDANSETRON 4 MG: 2 INJECTION INTRAMUSCULAR; INTRAVENOUS at 18:23

## 2017-12-13 RX ADMIN — LISINOPRIL 10 MG: 10 TABLET ORAL at 10:25

## 2017-12-13 RX ADMIN — LEVOTHYROXINE SODIUM 50 MCG: 50 TABLET ORAL at 06:17

## 2017-12-13 RX ADMIN — POTASSIUM CHLORIDE 40 MEQ: 1500 TABLET, EXTENDED RELEASE ORAL at 18:00

## 2017-12-13 NOTE — PROGRESS NOTES
Progress Note - Infectious Disease   Eulogio Hunter 65 y o  male MRN: [id number]  Unit/Bed#: -01 Encounter: [id number]      Impression/Plan:  1  Severe sepsis-POA at [facility]  Fever, leukocytosis, elevated lactate  A definitive source has not been yet found, however I suspect is related to the patient's hypodense liver lesion  No other clear sources appreciated  Thus far the patient's blood cultures have remained negative and he has become hemodynamically stable and nontoxic  He seems to be tolerating the antibiotics without difficulty  The lactate level normalized  -continue ceftriaxone for now  -follow-up blood cultures and liver biopsy cultures and adjust antibiotics as needed  -monitor CBC with diff and CMP  -supportive care  -additional workup if sepsis persist without a source     2  Hypodense liver mass-unclear etiology  With the patient having no liver mass back in late October, it is unlikely that this represents of malignancy  This more likely represents an inflammatory/infectious process like a phlegmon that has yet to become liquified into an abscess  No other clear source is appreciated  -follow up liver biopsy including cultures and pathology  -antibiotics as above for now  -may need additional imaging if the fever would persist to see if this hypodensity becomes more liquified  -no additional ID workup for now     3  Acute kidney injury-likely secondary to sepsis with hypotension  Pre renal issues may have significantly played a role  The renal function has improved and now stabilized  -monitor the GFR  -no need to dose adjust ceftriaxone for renal function  -no additional ID workup for now     4   Recent cholecystitis/cholangitis-with biliary obstruction  Status post ERCP with stenting  Possibly all related to 1  Transaminases are elevated however there does not appear to be an obstructive pattern, and imaging does not reveal any ongoing obstruction    The liver function tests have remained up  -monitor liver function test  -no additional ID workup for now  -recommend GI evaluation if the liver function tests would remain elevated and no other source of sepsis is found     5  Diabetes mellitus-type 2 with hyperglycemia    Antibiotics:  Ceftriaxone 2  Antibiotics 5    Subjective:  Patient still with intermittent fever but no chills or sweats; no nausea, but no vomiting, or diarrhea; he has a poor appetite; no cough, shortness of breath; still with some right upper quadrant pain  No new symptoms  Objective:  Vitals:  HR:  [101-110] 110  Resp:  [18] 18  BP: (141-190)/(69-91) 190/91  SpO2:  [91 %-97 %] 97 %  Temp (24hrs), Av 7 °F (37 6 °C), Min:98 3 °F (36 8 °C), Max:101 1 °F (38 4 °C)  Current: Temperature: 98 3 °F (36 8 °C)    Physical Exam:   General Appearance:  Alert, nontoxic, no acute distress  Throat: Oropharynx moist without lesions  Lungs:   Clear to auscultation bilaterally; respirations unlabored   Heart:  Tachycardic; no murmur, rub or gallop   Abdomen:   Soft, non-tender, non-distended, positive bowel sounds  Extremities: No clubbing, cyanosis or edema   Skin: No new rashes or lesions  No draining wounds noted         Labs, Imaging, & Other studies:   All pertinent labs and imaging studies were personally reviewed    Results from last 7 days  Lab Units 17  0616 17  1425 17  0555   WBC Thousand/uL 17 06* 15 71* 19 68*   HEMOGLOBIN g/dL 12 7 10 9* 10 0*   PLATELETS Thousands/uL 231 182 142*       Results from last 7 days  Lab Units 17  0616 17  1425 17  0555   SODIUM mmol/L 137 135* 139   POTASSIUM mmol/L 3 1* 3 2* 3 8   CHLORIDE mmol/L 104 106 110*   CO2 mmol/L 24 21 20*   ANION GAP mmol/L 9 8 9   BUN mg/dL 13 18 27*   CREATININE mg/dL 1 05 1 06 1 11   EGFR ml/min/1 73sq m 68 67 63   GLUCOSE RANDOM mg/dL 118 198* 95   CALCIUM mg/dL 8 8 7 5* 7 0*   AST U/L 36 45 140*   ALT U/L 105* 106* 155*   ALK PHOS U/L 139* 95 72 TOTAL PROTEIN g/dL 7 2 5 8* 5 4*   ALBUMIN g/dL 2 4* 2 1* 2 1*   BILIRUBIN TOTAL mg/dL 0 96 0 90 0 97       Results from last 7 days  Lab Units 12/10/17  2030 12/10/17  1202 12/10/17  0917 12/09/17  1974   BLOOD CULTURE   --  No Growth at 48 hrs  No Growth at 48 hrs  No Growth at 48 hrs  No Growth at 48 hrs     GRAM STAIN RESULT  No Polys or Bacteria seen  --   --   --    BODY FLUID CULTURE, STERILE  No growth  --   --   --

## 2017-12-13 NOTE — ASSESSMENT & PLAN NOTE
· Uncontrolled suspect secondary to lisinopril not ordered on admission likely due to hypotension  · Resume lisinopril  · Monitor

## 2017-12-13 NOTE — PHYSICAL THERAPY NOTE
Physical Therapy Evaluation    Patient's Name: Deja Hernandez    Admitting Diagnosis  Sepsis ([place] [address] ) [A41 9]    Problem List  Patient Active Problem List   Diagnosis    Severe sepsis ([place] [address] )    Type 2 diabetes mellitus with hyperglycemia ([place] [address] )    HTN (hypertension)    ALDEN (acute kidney injury) ([place] [address] )    Hypothyroidism    BPH (benign prostatic hyperplasia)    Liver mass    Cholelithiasis with biliary obstruction S/P Cholecystectomy 6 weeks ago    Anemia    Leukocytosis with bandemia       Past Medical History  Past Medical History:   Diagnosis Date    BPH (benign prostatic hyperplasia)     Diabetes mellitus ([place] [address] )     Hypertension     Hypothyroidism        Past Surgical History  Past Surgical History:   Procedure Laterality Date    APPENDECTOMY      CHOLECYSTECTOMY      biliary stent placement          12/13/17 1310   Note Type   Note type Eval only   Pain Assessment   Pain Assessment FLACC   Pain Type Acute pain   Pain Location Hip   Pain Orientation Left  (reports he pulled the muscle yesterday)   Pain Descriptors Aching   Pain Frequency Intermittent   Pain Onset Gradual   Clinical Progression Gradually improving   Effect of Pain on Daily Activities min discomfort w/ mobilization   Patient's Stated Pain Goal No pain   Hospital Pain Intervention(s) Repositioned; Ambulation/increased activity; Distraction; Emotional support   Response to Interventions appears to be comfortable post session   Pain Rating: FLACC (Rest) - Face 0   Pain Rating: FLACC (Rest) - Legs 0   Pain Rating: FLACC (Rest) - Activity 0   Pain Rating: FLACC (Rest) - Cry 1   Pain Rating: FLACC (Rest) - Consolability 0   Score: FLACC (Rest) 1   Pain Rating: FLACC (Activity) - Face 1   Pain Rating: FLACC (Activity) - Legs 0   Pain Rating: FLACC (Activity) - Activity 0   Pain Rating: FLACC (Activity) - Cry 1   Pain Rating: FLACC (Activity) - Consolability 0   Score: FLACC (Activity) 2   Home Living   Type of Home House   Home Layout One level;Ramped entrance  (in [place] state)   Additional Comments Later during the session (after the mobilization was completed x 2), informed by nsg that pt is visiting the area from [place]; pt confirms and reports he will be going back home directly to [place] upon D/C;   Prior Function   Level of Ray Independent with ADLs and functional mobility  (amb w/o AD)   Lives With Spouse   Restrictions/Precautions   Braces or Orthoses (denies)   Other Precautions Fall Risk   General   Additional Pertinent History cleared for assessment (spoke to nsg)   Cognition   Overall Cognitive Status WFL   Arousal/Participation Alert   Orientation Level Oriented to person;Oriented to place;Oriented to situation   Memory Within functional limits   Following Commands Follows one step commands without difficulty   Comments Pt is observed in bed; reports he has been moving around on his own; agreeable to demonstrate mobility skills   RUE Assessment   RUE Assessment WFL  (AROM)   LUE Assessment   LUE Assessment WFL  (AROM)   RLE Assessment   RLE Assessment WFL  (AROM)   Strength RLE   RLE Overall Strength 4-/5   LLE Assessment   LLE Assessment WFL  (AROM)   Strength LLE   LLE Overall Strength 4-/5   Bed Mobility   Supine to Sit 7  Independent   Sit to Supine 7  Independent   Transfers   Sit to Stand 6  Modified independent  (2 trials)   Stand to Sit 6  Modified independent  (2 trials)   Additional Comments Steps negotiation will be considered in the POC in case pt encounters elevations while travelling back to his home in [place]  Ambulation/Elevation   Gait pattern Excessively slow; Short stride; Inconsistent nina  (min initial scissoring;)   Gait Assistance 5  Supervision   Additional items Assist x 1;Verbal cues  (gait patterns improved as the session went on)   Assistive Device None  (pt denies the need for SPC or rw)   Distance 2 x 120 ft w/ sitted rest period in between   Stair Management Assistance Not tested  (pt declines at that time; reports he will try tomorrow)   Balance   Static Sitting Good   Static Standing Fair +   Ambulatory Fair   Activity Tolerance   Activity Tolerance Patient tolerated treatment well   Nurse Made Aware spoke to [place], [facility]   Assessment   Prognosis Good   Problem List Decreased endurance  (inconsistent gait patterns)   Assessment Pt is 65 y o  with w/ hx of recent cholecystectomy w/ biliary stent placement 10/25/17, presents with fevers/chills/rigors  Dx include: severe sepsis, ALDEN  Pt 's comorbidities affecting POC include: DM, HTN and personal factors of: pt will need to travel back to [place] (home)  Pt's clinical presentation is currently evolving which is evident in abn lab values being monitored, intermittent chills, episode of nausea during the course, and intermittent (L) hip/thigh discomfort (? etiology; pt feels it is muscular)  Pt presents w/ min generalized weakness, min decreased functional endurance, and inconsistent gait patterns  Will  cont to follow pt in PT for progressive mobilization to max level of (I) w/ functional mobility on level surfaces, endurance, and safety and to address elevations prior to D/C as indicated above; otherwise, overall functional mobility status appears to be near premorbid level --> anticipate will return home w/ available family support upon D/C when medically cleared; may need to consider OP PT for general strengthening and reconditioning; pt informed; will follow  Goals   Patient Goals to go home   STG Expiration Date 12/18/17   Short Term Goal #1 3-5 days  Pt will amb 300 ft w/o AD, (I)  Pt will negotiate 12 steps w/ hand rail, mod (I)  Pt will demonstrate good standing unsupported balance  Plan   Treatment/Interventions LE strengthening/ROM; Elevations; Therapeutic exercise; Endurance training;Gait training;Spoke to nursing   PT Frequency 5x/wk   Recommendation   Recommendation Outpatient PT; Home with family support   Modified [place] Scale Modified [place]c Scale 2   Barthel Index   Feeding 10   Bathing 5   Grooming Score 5   Dressing Score 10   Bladder Score 10   Bowels Score 10   Toilet Use Score 10   Transfers (Bed/Chair) Score 15   Mobility (Level Surface) Score 10   Stairs Score 0   Barthel Index Score 85       Clearance Bosworth, PT

## 2017-12-13 NOTE — PROGRESS NOTES
Pt c/o N & indigestion, unable to eat  pt given zofran & one tab of TUMS to trial out, will give second should discomfort be unresolved

## 2017-12-13 NOTE — ASSESSMENT & PLAN NOTE
· This lesion was not present prior to deny  · S/p biopsy with IR  · ID following  · Will need follow up with GI specialist in [place] Dr Krishna Mueller [phone]

## 2017-12-13 NOTE — PLAN OF CARE
Problem: PHYSICAL THERAPY ADULT  Goal: Performs mobility at highest level of function for planned discharge setting  See evaluation for individualized goals  Treatment/Interventions: LE strengthening/ROM, Elevations, Therapeutic exercise, Endurance training, Gait training, Spoke to nursing          See flowsheet documentation for full assessment, interventions and recommendations  Prognosis: Good  Problem List: Decreased endurance (inconsistent gait patterns)  Assessment: Pt is 65 y o  with w/ hx of recent cholecystectomy w/ biliary stent placement 10/25/17, presents with fevers/chills/rigors  Dx include: severe sepsis, ALDEN  Pt 's comorbidities affecting POC include: DM, HTN and personal factors of: pt will need to travel back to [place] (home)  Pt's clinical presentation is currently evolving which is evident in abn lab values being monitored, intermittent chills, episode of nausea during the course, and intermittent (L) hip/thigh discomfort (? etiology; pt feels it is muscular)  Pt presents w/ min generalized weakness, min decreased functional endurance, and inconsistent gait patterns  Will  cont to follow pt in PT for progressive mobilization to max level of (I) w/ functional mobility on level surfaces, endurance, and safety and to address elevations prior to D/C as indicated above; otherwise, overall functional mobility status appears to be near premorbid level --> anticipate will return home w/ available family support upon D/C when medically cleared; may need to consider OP PT for general strengthening and reconditioning; pt informed; will follow  Recommendation: Outpatient PT, Home with family support          See flowsheet documentation for full assessment

## 2017-12-13 NOTE — PROGRESS NOTES
Progress Note - Deepak Shine 1940, 65 y o  male MRN: [id number]    Unit/Bed#: -01 Encounter: [id number]    Primary Care Provider: No primary care provider on file     Date and time admitted to hospital: 12/10/2017  2:29 PM      * Severe sepsis ([facility] [address] )   Assessment & Plan    · POA as evidenced by leukocytosis, fever, elevated lactic  · Patient clinically stable and nontoxic appearing  · Unclear source,liver lesion thought to be source biopsy pending  · Blood cultures negative thus far, CXR clear, UA unremarkable, ?viral  · Still febrile last night 101 1 and low grade today with increase noted in WBC count today   · Appreciate ID consult and recommendations  · Continue ceftriaxone   · Follow BC, vitals, and labs  · PCP in [place] Dr Mercedes Garg [phone]        Liver mass   Assessment & Plan    · This lesion was not present prior to deny  · S/p biopsy with IR  · ID following  · Will need follow up with GI specialist in [place] Dr Nan Rosado [phone]        HTN (hypertension)   Assessment & Plan    · Uncontrolled suspect secondary to lisinopril not ordered on admission likely due to hypotension  · Resume lisinopril  · Monitor         ALDEN (acute kidney injury) ([facility] [address] )   Assessment & Plan    · POA and likely secondary to sepsis and hypotension  · Improved  · Monitor         Type 2 diabetes mellitus with hyperglycemia (HCC)   Assessment & Plan    · Blood sugars acceptable  · Continue accu checks AC/HS with SSI  · Metformin on hold        Cholelithiasis with biliary obstruction S/P Cholecystectomy 6 weeks ago   Assessment & Plan    · LFTs elevated  · Continue to trend if increasing consider GI consult  · Will need GI follow up upon returning home        Anemia   Assessment & Plan    · stable        Hypokalemia   Assessment & Plan    · Replete  · Monitor BMP            VTE Pharmacologic Prophylaxis:   Pharmacologic: Enoxaparin (Lovenox)  Mechanical VTE Prophylaxis in Place: Yes    Patient Centered Rounds: I have performed bedside rounds with nursing staff today  Discussions with Specialists or Other Care Team Provider: reviewed ID notes    Education and Discussions with Family / Patient: patient and wife at bedside and daughter updated via phone    Time Spent for Care: 30 minutes  More than 50% of total time spent on counseling and coordination of care as described above  Current Length of Stay: 3 day(s)    Current Patient Status: Inpatient   Certification Statement: The patient will continue to require additional inpatient hospital stay due to IV abx for sepsis, monitoring labs    Discharge Plan: home when medically stable and cleared by ID    Code Status: Level 1 - Full Code      Subjective:   Patient reports minimal tenderness RUQ, no N/V  Appetite improved today but feels like he need water to get food down  No HA or dizziness  OOB ambulating halls without difficulty  Feels mild nasal congestion and sorethroat    Objective:     Vitals:   Temp (24hrs), Av 2 °F (37 3 °C), Min:97 6 °F (36 4 °C), Max:101 1 °F (38 4 °C)    HR:  [] 94  Resp:  [18] 18  BP: (141-190)/(69-91) 152/80  SpO2:  [91 %-97 %] 96 %  Body mass index is 21 85 kg/m²  Input and Output Summary (last 24 hours): Intake/Output Summary (Last 24 hours) at 17 1733  Last data filed at 17 1215   Gross per 24 hour   Intake              890 ml   Output             1825 ml   Net             -935 ml       Physical Exam:     Physical Exam   Constitutional: He is oriented to person, place, and time  He appears well-developed and well-nourished  No distress  HENT:   Head: Normocephalic  Mouth/Throat: Oropharynx is clear and moist  No oropharyngeal exudate  Eyes: No scleral icterus  Neck: Neck supple  Cardiovascular: Normal rate and regular rhythm  Pulmonary/Chest: Effort normal and breath sounds normal  No respiratory distress  Abdominal: Soft  Bowel sounds are normal  He exhibits no distension  There is tenderness  There is no rebound and no guarding  Tender RUQ biopsy site   Musculoskeletal: Normal range of motion  He exhibits no edema  Neurological: He is alert and oriented to person, place, and time  No cranial nerve deficit  Skin: Skin is warm and dry  No rash noted  Psychiatric: He has a normal mood and affect  His behavior is normal    Vitals reviewed  Additional Data:     Labs:      Results from last 7 days  Lab Units 12/13/17  0616   WBC Thousand/uL 17 06*   HEMOGLOBIN g/dL 12 7   HEMATOCRIT % 37 4   PLATELETS Thousands/uL 231   NEUTROS PCT % 80*   LYMPHS PCT % 10*   MONOS PCT % 9   EOS PCT % 1       Results from last 7 days  Lab Units 12/13/17  0616   SODIUM mmol/L 137   POTASSIUM mmol/L 3 1*   CHLORIDE mmol/L 104   CO2 mmol/L 24   BUN mg/dL 13   CREATININE mg/dL 1 05   CALCIUM mg/dL 8 8   TOTAL PROTEIN g/dL 7 2   BILIRUBIN TOTAL mg/dL 0 96   ALK PHOS U/L 139*   ALT U/L 105*   AST U/L 36   GLUCOSE RANDOM mg/dL 118       Results from last 7 days  Lab Units 12/09/17  2354   INR  1 34*       * I Have Reviewed All Lab Data Listed Above  * Additional Pertinent Lab Tests Reviewed: All Labs Within Last 24 Hours Reviewed    Imaging:    Imaging Reports Reviewed Today Include: CXR  Imaging Personally Reviewed by Myself Includes:  none    Recent Cultures (last 7 days):       Results from last 7 days  Lab Units 12/10/17  2030 12/10/17  1202 12/10/17  0917 12/09/17  2354   BLOOD CULTURE   --  No Growth at 48 hrs  No Growth at 72 hrs  No Growth at 72 hrs  No Growth at 72 hrs     GRAM STAIN RESULT  No Polys or Bacteria seen  --   --   --    BODY FLUID CULTURE, STERILE  No growth  --   --   --        Last 24 Hours Medication List:     cefTRIAXone 2,000 mg Intravenous Q24H   enoxaparin 40 mg Subcutaneous Q24H SILVINO   insulin lispro 1-5 Units Subcutaneous TID AC   insulin lispro 1-5 Units Subcutaneous HS   levothyroxine 50 mcg Oral Early Morning   lisinopril 10 mg Oral Q12H [address]   potassium chloride 40 mEq Oral BID   tamsulosin 0 4 mg Oral Daily With Dinner        Today, Patient Was Seen By: PARISH Solis    ** Please Note: Dictation voice to text software may have been used in the creation of this document   **

## 2017-12-13 NOTE — ASSESSMENT & PLAN NOTE
· LFTs elevated  · Continue to trend if increasing consider GI consult  · Will need GI follow up upon returning home

## 2017-12-13 NOTE — ASSESSMENT & PLAN NOTE
· POA as evidenced by leukocytosis, fever, elevated lactic  · Patient clinically stable and nontoxic appearing  · Unclear source,liver lesion thought to be source biopsy pending  · Blood cultures negative thus far, CXR clear, UA unremarkable, ?viral  · Still febrile last night 101 1 and low grade today with increase noted in WBC count today   · Appreciate ID consult and recommendations  · Continue ceftriaxone   · Follow BC, vitals, and labs  · PCP in [place] Dr Fransisca Yadav [phone]

## 2017-12-14 LAB
ALBUMIN SERPL BCP-MCNC: 2.1 G/DL (ref 3.5–5)
ALP SERPL-CCNC: 111 U/L (ref 46–116)
ALT SERPL W P-5'-P-CCNC: 66 U/L (ref 12–78)
ANION GAP SERPL CALCULATED.3IONS-SCNC: 6 MMOL/L (ref 4–13)
AST SERPL W P-5'-P-CCNC: 26 U/L (ref 5–45)
BACTERIA SPEC BFLD CULT: NO GROWTH
BASOPHILS # BLD AUTO: 0.02 THOUSANDS/ΜL (ref 0–0.1)
BASOPHILS NFR BLD AUTO: 0 % (ref 0–1)
BILIRUB SERPL-MCNC: 0.67 MG/DL (ref 0.2–1)
BUN SERPL-MCNC: 9 MG/DL (ref 5–25)
CALCIUM SERPL-MCNC: 8.6 MG/DL (ref 8.3–10.1)
CHLORIDE SERPL-SCNC: 105 MMOL/L (ref 100–108)
CO2 SERPL-SCNC: 26 MMOL/L (ref 21–32)
CREAT SERPL-MCNC: 0.86 MG/DL (ref 0.6–1.3)
EOSINOPHIL # BLD AUTO: 0.13 THOUSAND/ΜL (ref 0–0.61)
EOSINOPHIL NFR BLD AUTO: 1 % (ref 0–6)
ERYTHROCYTE [DISTWIDTH] IN BLOOD BY AUTOMATED COUNT: 14 % (ref 11.6–15.1)
GFR SERPL CREATININE-BSD FRML MDRD: 83 ML/MIN/1.73SQ M
GLUCOSE SERPL-MCNC: 100 MG/DL (ref 65–140)
GLUCOSE SERPL-MCNC: 103 MG/DL (ref 65–140)
GLUCOSE SERPL-MCNC: 108 MG/DL (ref 65–140)
GLUCOSE SERPL-MCNC: 156 MG/DL (ref 65–140)
GLUCOSE SERPL-MCNC: 159 MG/DL (ref 65–140)
GRAM STN SPEC: NORMAL
HCT VFR BLD AUTO: 32.6 % (ref 36.5–49.3)
HGB BLD-MCNC: 11.1 G/DL (ref 12–17)
LYMPHOCYTES # BLD AUTO: 1.52 THOUSANDS/ΜL (ref 0.6–4.47)
LYMPHOCYTES NFR BLD AUTO: 12 % (ref 14–44)
MCH RBC QN AUTO: 26.9 PG (ref 26.8–34.3)
MCHC RBC AUTO-ENTMCNC: 34 G/DL (ref 31.4–37.4)
MCV RBC AUTO: 79 FL (ref 82–98)
MONOCYTES # BLD AUTO: 1.37 THOUSAND/ΜL (ref 0.17–1.22)
MONOCYTES NFR BLD AUTO: 10 % (ref 4–12)
NEUTROPHILS # BLD AUTO: 9.85 THOUSANDS/ΜL (ref 1.85–7.62)
NEUTS SEG NFR BLD AUTO: 77 % (ref 43–75)
NRBC BLD AUTO-RTO: 0 /100 WBCS
PLATELET # BLD AUTO: 236 THOUSANDS/UL (ref 149–390)
PMV BLD AUTO: 11 FL (ref 8.9–12.7)
POTASSIUM SERPL-SCNC: 3.3 MMOL/L (ref 3.5–5.3)
PROT SERPL-MCNC: 6.2 G/DL (ref 6.4–8.2)
RBC # BLD AUTO: 4.12 MILLION/UL (ref 3.88–5.62)
SODIUM SERPL-SCNC: 137 MMOL/L (ref 136–145)
WBC # BLD AUTO: 13.12 THOUSAND/UL (ref 4.31–10.16)

## 2017-12-14 PROCEDURE — 85025 COMPLETE CBC W/AUTO DIFF WBC: CPT | Performed by: NURSE PRACTITIONER

## 2017-12-14 PROCEDURE — 80053 COMPREHEN METABOLIC PANEL: CPT | Performed by: NURSE PRACTITIONER

## 2017-12-14 PROCEDURE — 82948 REAGENT STRIP/BLOOD GLUCOSE: CPT

## 2017-12-14 RX ORDER — POTASSIUM CHLORIDE 20 MEQ/1
40 TABLET, EXTENDED RELEASE ORAL ONCE
Status: COMPLETED | OUTPATIENT
Start: 2017-12-14 | End: 2017-12-14

## 2017-12-14 RX ADMIN — LEVOTHYROXINE SODIUM 50 MCG: 50 TABLET ORAL at 06:02

## 2017-12-14 RX ADMIN — ENOXAPARIN SODIUM 40 MG: 40 INJECTION SUBCUTANEOUS at 09:18

## 2017-12-14 RX ADMIN — TAMSULOSIN HYDROCHLORIDE 0.4 MG: 0.4 CAPSULE ORAL at 16:49

## 2017-12-14 RX ADMIN — POTASSIUM CHLORIDE 40 MEQ: 1500 TABLET, EXTENDED RELEASE ORAL at 16:49

## 2017-12-14 RX ADMIN — LISINOPRIL 10 MG: 10 TABLET ORAL at 20:49

## 2017-12-14 RX ADMIN — CEFTRIAXONE 2000 MG: 2 INJECTION, SOLUTION INTRAVENOUS at 15:56

## 2017-12-14 RX ADMIN — LISINOPRIL 10 MG: 10 TABLET ORAL at 09:18

## 2017-12-14 NOTE — NUTRITION
12/14/17 1321   Recommendations/Interventions   Interventions Supplement initiate   Nutrition Recommendations Other (specify)  (Consider med for dyspepsia- pt c/o increased fullness, burning in stomach and belching with eating; Replete K+)

## 2017-12-14 NOTE — PROGRESS NOTES
[facility] Progress Note  Patient: Antoinette Lu 65 y o  male   MRN: [id number]  PCP: No primary care provider on file  Unit/Bed#: MS Daniel01 Encounter: [id number]  Date Of Visit: 12/14/17    Assessment:    Principal Problem:    Severe sepsis (HCC)  Active Problems:    Type 2 diabetes mellitus with hyperglycemia (HCC)    HTN (hypertension)    ALDEN (acute kidney injury) ([address] )    Liver mass    Cholelithiasis with biliary obstruction S/P Cholecystectomy 6 weeks ago    Anemia    Hypokalemia      Plan:    1  Severe sepsis, present on admission in [facility] [place], suspect secondary to early hepatic infection:  Infectious disease doctor on board  Continue with present intravenous antibiotic  Follow final results of the cultures and liver biopsy cultures and adjust antibiotics as needed  Monitor CBC with differential count and CMP  2   Hypodense liver mass, previous biopsy appears consistent with abscess  However, we cannot totally rule out possibility of malignancy; patient had recent episode of cholecystitis and cholangitis with biliary obstruction status post ERCP with stenting; liver function tests had improved: Follow-up final liver biopsy cultures  Infectious Disease doctor is recommending a repeat CT scan with contrast to see if anything has liquified  However, according to patient's outpatient doctors, patient had anaphylaxis with IV dye and iodine before  Thus, they asked me if an MRI may be ordered instead  I spoke to our advance practitioner for GI and from our discussion, will order an MRI of the abdomen  This even though that patient had a CT scan with an IV dye here without any significant allergic or adverse reaction  Infectious Disease doctor is also okay with the MRI of the abdomen    Patient's outpatient doctors were also asking me if patient stents need to be removed at this point prior to patient going back to [place] and if patient needs a repeat ERCP at this point  We will have GI to be on board  3   Acute kidney injury, resolved, likely due to patient's previous sepsis and hypotension:  Monitor patient's BMP and input and output  4   Diabetes mellitus type 2 with hyperglycemia[de-identified]  Continue insulin treatment  Will adjust this accordingly  Blood sugars are acceptable levels  Continue holding off metformin  5   Anemia, stable, possible hemodilution from IV fluids; no active bleeding:  Recheck patient's CBC  For further workup and management if this worsens significantly  6   Hypokalemia:  Replace patient's potassium  7   Poor oral intake with dysphagia with some coughing with eating and taking fluids: Will have speech therapist to evaluate this patient as well as GI evaluation  Note:  Patient had an episode of loose stools today  If this progress or worsens to keith diarrhea, will send stools for C diff PCR  VTE Pharmacologic Prophylaxis:   Pharmacologic: Enoxaparin (Lovenox)  Mechanical: Mechanical VTE prophylaxis in place  Discussions with Specialists or Other Care Team Provider:  Patient's nurse  Case management  Dr Minerva Turner; infectious disease doctor  GI advance practitioner  Education and Discussions with Family / Patient:  Patient  Patient's family at bedside, friend (patient is okay with this), with a family/relative who is a doctor on the cell phone as well as patient's primary care physician on the cell phone  I did discuss our findings and plans the extensively  I answered all their questions and concerns  Time Spent for Care: 45 minutes  More than 50% of total time spent on counseling and coordination of care as described above  Current Length of Stay: 4 day(s)    Current Patient Status: Inpatient   Certification Statement: The patient will continue to require additional inpatient hospital stay due to Above findings and plans  Discharge Plan:  None yet      Code Status: Level 1 - Full Code      Subjective:   Patient tells me that he still not doing fine  He tells me that he has problems eating particularly with swallowing  Thus he still does not eat that much  Patient also still has occasional pains at the right upper quadrant area of the abdomen  Patient has occasional nausea but no actual vomiting episodes  Patient also has been having coughing on swallowing and admits to occasional shortness of breath  Patient also had an episode of loose stools today  Otherwise no other complaints  Objective:     Vitals:   Temp (24hrs), Av 3 °F (36 8 °C), Min:97 6 °F (36 4 °C), Max:98 9 °F (37 2 °C)    HR:  [] 103  Resp:  [18] 18  BP: (131-179)/(79-85) 179/79  SpO2:  [92 %-94 %] 94 %  Body mass index is 21 79 kg/m²  Input and Output Summary (last 24 hours): Intake/Output Summary (Last 24 hours) at 17 1510  Last data filed at 17 1301   Gross per 24 hour   Intake              780 ml   Output             1600 ml   Net             -820 ml       Physical Exam:     Physical Exam   Constitutional: He is oriented to person, place, and time  No distress  HENT:   Head: Normocephalic and atraumatic  Eyes: Right eye exhibits no discharge  Left eye exhibits no discharge  No scleral icterus  Neck: No JVD present  No tracheal deviation present  Cardiovascular: Normal rate, regular rhythm and normal heart sounds  Exam reveals no gallop and no friction rub  No murmur heard  Pulmonary/Chest: Effort normal and breath sounds normal  No stridor  No respiratory distress  He has no wheezes  He has no rales  Abdominal: Soft  Bowel sounds are normal  He exhibits no distension  There is tenderness  There is no rebound and no guarding  Positive for right upper quadrant tenderness  Musculoskeletal: He exhibits no edema, tenderness or deformity  Neurological: He is alert and oriented to person, place, and time  Skin: Skin is warm  No rash noted  He is not diaphoretic   No erythema  No pallor  Psychiatric: He has a normal mood and affect  His behavior is normal  Thought content normal    Vitals reviewed  Additional Data:     Labs:      Results from last 7 days  Lab Units 12/14/17  0555   WBC Thousand/uL 13 12*   HEMOGLOBIN g/dL 11 1*   HEMATOCRIT % 32 6*   PLATELETS Thousands/uL 236   NEUTROS PCT % 77*   LYMPHS PCT % 12*   MONOS PCT % 10   EOS PCT % 1       Results from last 7 days  Lab Units 12/14/17  0555   SODIUM mmol/L 137   POTASSIUM mmol/L 3 3*   CHLORIDE mmol/L 105   CO2 mmol/L 26   BUN mg/dL 9   CREATININE mg/dL 0 86   CALCIUM mg/dL 8 6   TOTAL PROTEIN g/dL 6 2*   BILIRUBIN TOTAL mg/dL 0 67   ALK PHOS U/L 111   ALT U/L 66   AST U/L 26   GLUCOSE RANDOM mg/dL 103       Results from last 7 days  Lab Units 12/09/17  2354   INR  1 34*       * I Have Reviewed All Lab Data Listed Above  * Additional Pertinent Lab Tests Reviewed: [facility] Admission Reviewed    Imaging:    Imaging Reports Reviewed Today Include:  Diagnostic imaging studies that were done on this admission  Imaging Personally Reviewed by Myself Includes:  None  Cultures:   Blood Culture:   Lab Results   Component Value Date    BLOODCX No Growth at 72 hrs  12/10/2017    BLOODCX No Growth After 4 Days  12/10/2017    BLOODCX No Growth After 4 Days  12/09/2017    BLOODCX No Growth After 4 Days   12/09/2017     Urine Culture: No results found for: URINECX  Sputum Culture: No components found for: SPUTUMCX  Wound Culture: No results found for: WOUNDCULT    Last 24 Hours Medication List:     cefTRIAXone 2,000 mg Intravenous Q24H   enoxaparin 40 mg Subcutaneous Q24H [address]   insulin lispro 1-5 Units Subcutaneous TID AC   insulin lispro 1-5 Units Subcutaneous HS   levothyroxine 50 mcg Oral Early Morning   lisinopril 10 mg Oral Q12H [address]   tamsulosin 0 4 mg Oral Daily With Dinner        Today, Patient Was Seen By: Renu Rachel MD    ** Please Note: Dragon 360 Dictation voice to text software may have been used in the creation of this document   **

## 2017-12-14 NOTE — PROGRESS NOTES
Progress Note - Infectious Disease   Eulogio Hunter 65 y o  male MRN: [id number]  Unit/Bed#: -01 Encounter: [id number]      Impression/Plan:  1   Severe sepsis-POA at [facility], leukocytosis, elevated lactate  Suspect secondary to early hepatic infection    No other clear sources appreciated  Thus far the patient's blood cultures have remained negative and he has become hemodynamically stable and nontoxic   He seems to be tolerating the antibiotics without difficulty  The lactate level normalized  The fever seems to be resolving  -continue ceftriaxone for now  -follow-up final blood cultures and liver biopsy cultures and adjust antibiotics as needed  -monitor CBC with diff and CMP  -supportive care  -additional workup as below     2   Hypodense liver mass-Biopsy appears consistent with abscess  Cannot exclude adjacent malignancy   With the patient having no liver mass back in late October, it is unlikely that this represents of malignancy   This more likely represents an inflammatory/infectious process like a phlegmon that has yet to become liquified into an abscess   No other clear source is appreciated  -follow up final liver biopsy cultures  -antibiotics as above for now  -recommend repeat CT of the abdomen with contrast to see if anything is liquified  -no additional ID workup for now     3   Acute kidney injury-likely secondary to sepsis with hypotension   Pre renal issues may have significantly played a role   The renal function has improved and now stabilized  -monitor the GFR  -no need to dose adjust ceftriaxone for renal function  -no additional ID workup for now     4   Recent cholecystitis/cholangitis-with biliary obstruction   Status post ERCP with stenting   Possibly all related to 1  Transaminases are elevated however there does not appear to be an obstructive pattern, and imaging does not reveal any ongoing obstruction    the liver function tests have significantly improved  -monitor liver function test  -no additional ID workup for now  -outpatient GI follow-up     5   Diabetes mellitus-type 2 with hyperglycemia    Antibiotics:  Ceftriaxone 3  Antibiotics 6    Subjective:  Patient has no fever, chills, sweats; no nausea, vomiting, diarrhea; no cough, shortness of breath; no pain  No new symptoms  He remains quite fatigued and has a poor appetite  Objective:  Vitals:  HR:  [] 103  Resp:  [18] 18  BP: (131-179)/(79-85) 179/79  SpO2:  [92 %-96 %] 92 %  Temp (24hrs), Av °F (36 7 °C), Min:97 6 °F (36 4 °C), Max:98 9 °F (37 2 °C)  Current: Temperature: 98 9 °F (37 2 °C)    Physical Exam:   General Appearance:  Alert, nontoxic, no acute distress  Throat: Oropharynx moist without lesions  Lungs:   Clear to auscultation bilaterally; respirations unlabored   Heart:  RRR; no murmur, rub or gallop   Abdomen:   Soft, non-tender, non-distended, positive bowel sounds  Extremities: No clubbing, cyanosis or edema   Skin: No new rashes or lesions  No draining wounds noted         Labs, Imaging, & Other studies:   All pertinent labs and imaging studies were personally reviewed    Results from last 7 days  Lab Units 17  0555 17  0616 17  1425   WBC Thousand/uL 13 12* 17 06* 15 71*   HEMOGLOBIN g/dL 11 1* 12 7 10 9*   PLATELETS Thousands/uL 236 231 182       Results from last 7 days  Lab Units 17  0555 17  0616 17  1425   SODIUM mmol/L 137 137 135*   POTASSIUM mmol/L 3 3* 3 1* 3 2*   CHLORIDE mmol/L 105 104 106   CO2 mmol/L 26 24 21   ANION GAP mmol/L 6 9 8   BUN mg/dL 9 13 18   CREATININE mg/dL 0 86 1 05 1 06   EGFR ml/min/1 73sq m 83 68 67   GLUCOSE RANDOM mg/dL 103 118 198*   CALCIUM mg/dL 8 6 8 8 7 5*   AST U/L 26 36 45   ALT U/L 66 105* 106*   ALK PHOS U/L 111 139* 95   TOTAL PROTEIN g/dL 6 2* 7 2 5 8*   ALBUMIN g/dL 2 1* 2 4* 2 1*   BILIRUBIN TOTAL mg/dL 0 67 0 96 0 90       Results from last 7 days  Lab Units 12/10/17  2030 12/10/17  1202 12/10/17  0917 12/09/17  2354   BLOOD CULTURE   --  No Growth at 72 hrs  No Growth at 72 hrs  No Growth at 72 hrs  No Growth at 72 hrs     GRAM STAIN RESULT  No Polys or Bacteria seen  --   --   --    BODY FLUID CULTURE, STERILE  No growth  --   --   --      Pathology, acute inflammatory changes consistent with abscess

## 2017-12-14 NOTE — PLAN OF CARE
DISCHARGE PLANNING     Discharge to home or other facility with appropriate resources Progressing        DISCHARGE PLANNING - CARE MANAGEMENT     Discharge to post-acute care or home with appropriate resources Progressing        GASTROINTESTINAL - ADULT     Minimal or absence of nausea and/or vomiting Progressing        INFECTION - ADULT     Absence or prevention of progression during hospitalization Progressing     Absence of fever/infection during neutropenic period Progressing        Knowledge Deficit     Patient/family/caregiver demonstrates understanding of disease process, treatment plan, medications, and discharge instructions Progressing        Nutrition/Hydration-ADULT     Nutrient/Hydration intake appropriate for improving, restoring or maintaining nutritional needs Progressing        PAIN - ADULT     Verbalizes/displays adequate comfort level or baseline comfort level Progressing        Potential for Falls     Patient will remain free of falls Progressing        Prexisting or High Potential for Compromised Skin Integrity     Skin integrity is maintained or improved Progressing        SAFETY ADULT     Maintain or return to baseline ADL function Progressing     Maintain or return mobility status to optimal level Progressing

## 2017-12-15 ENCOUNTER — APPOINTMENT (INPATIENT)
Dept: RADIOLOGY | Facility: HOSPITAL | Age: 77
DRG: 919 | End: 2017-12-15
Payer: COMMERCIAL

## 2017-12-15 LAB
ALBUMIN SERPL BCP-MCNC: 2.2 G/DL (ref 3.5–5)
ALP SERPL-CCNC: 124 U/L (ref 46–116)
ALT SERPL W P-5'-P-CCNC: 66 U/L (ref 12–78)
ANION GAP SERPL CALCULATED.3IONS-SCNC: 7 MMOL/L (ref 4–13)
AST SERPL W P-5'-P-CCNC: 33 U/L (ref 5–45)
BACTERIA BLD CULT: NORMAL
BILIRUB SERPL-MCNC: 0.46 MG/DL (ref 0.2–1)
BUN SERPL-MCNC: 9 MG/DL (ref 5–25)
CALCIUM SERPL-MCNC: 8.8 MG/DL (ref 8.3–10.1)
CHLORIDE SERPL-SCNC: 104 MMOL/L (ref 100–108)
CO2 SERPL-SCNC: 27 MMOL/L (ref 21–32)
CREAT SERPL-MCNC: 0.84 MG/DL (ref 0.6–1.3)
ERYTHROCYTE [DISTWIDTH] IN BLOOD BY AUTOMATED COUNT: 14.1 % (ref 11.6–15.1)
GFR SERPL CREATININE-BSD FRML MDRD: 84 ML/MIN/1.73SQ M
GLUCOSE SERPL-MCNC: 116 MG/DL (ref 65–140)
GLUCOSE SERPL-MCNC: 119 MG/DL (ref 65–140)
GLUCOSE SERPL-MCNC: 157 MG/DL (ref 65–140)
GLUCOSE SERPL-MCNC: 159 MG/DL (ref 65–140)
GLUCOSE SERPL-MCNC: 170 MG/DL (ref 65–140)
HCT VFR BLD AUTO: 35.2 % (ref 36.5–49.3)
HGB BLD-MCNC: 11.8 G/DL (ref 12–17)
MCH RBC QN AUTO: 26.8 PG (ref 26.8–34.3)
MCHC RBC AUTO-ENTMCNC: 33.5 G/DL (ref 31.4–37.4)
MCV RBC AUTO: 80 FL (ref 82–98)
PLATELET # BLD AUTO: 280 THOUSANDS/UL (ref 149–390)
PMV BLD AUTO: 10.5 FL (ref 8.9–12.7)
POTASSIUM SERPL-SCNC: 3.6 MMOL/L (ref 3.5–5.3)
PROT SERPL-MCNC: 6.6 G/DL (ref 6.4–8.2)
RBC # BLD AUTO: 4.4 MILLION/UL (ref 3.88–5.62)
SODIUM SERPL-SCNC: 138 MMOL/L (ref 136–145)
WBC # BLD AUTO: 13.49 THOUSAND/UL (ref 4.31–10.16)

## 2017-12-15 PROCEDURE — 92610 EVALUATE SWALLOWING FUNCTION: CPT

## 2017-12-15 PROCEDURE — G8979 MOBILITY GOAL STATUS: HCPCS

## 2017-12-15 PROCEDURE — 97116 GAIT TRAINING THERAPY: CPT

## 2017-12-15 PROCEDURE — 82948 REAGENT STRIP/BLOOD GLUCOSE: CPT

## 2017-12-15 PROCEDURE — A9585 GADOBUTROL INJECTION: HCPCS | Performed by: INTERNAL MEDICINE

## 2017-12-15 PROCEDURE — G8978 MOBILITY CURRENT STATUS: HCPCS

## 2017-12-15 PROCEDURE — 85027 COMPLETE CBC AUTOMATED: CPT | Performed by: INTERNAL MEDICINE

## 2017-12-15 PROCEDURE — 74183 MRI ABD W/O CNTR FLWD CNTR: CPT

## 2017-12-15 PROCEDURE — 80053 COMPREHEN METABOLIC PANEL: CPT | Performed by: INTERNAL MEDICINE

## 2017-12-15 PROCEDURE — G8980 MOBILITY D/C STATUS: HCPCS

## 2017-12-15 RX ORDER — ONDANSETRON 4 MG/1
4 TABLET, ORALLY DISINTEGRATING ORAL
Status: DISCONTINUED | OUTPATIENT
Start: 2017-12-15 | End: 2017-12-22 | Stop reason: HOSPADM

## 2017-12-15 RX ORDER — SACCHAROMYCES BOULARDII 250 MG
250 CAPSULE ORAL 2 TIMES DAILY
Status: DISCONTINUED | OUTPATIENT
Start: 2017-12-15 | End: 2017-12-22 | Stop reason: HOSPADM

## 2017-12-15 RX ADMIN — INSULIN LISPRO 1 UNITS: 100 INJECTION, SOLUTION INTRAVENOUS; SUBCUTANEOUS at 16:17

## 2017-12-15 RX ADMIN — INSULIN LISPRO 1 UNITS: 100 INJECTION, SOLUTION INTRAVENOUS; SUBCUTANEOUS at 21:48

## 2017-12-15 RX ADMIN — LISINOPRIL 10 MG: 10 TABLET ORAL at 21:48

## 2017-12-15 RX ADMIN — TAMSULOSIN HYDROCHLORIDE 0.4 MG: 0.4 CAPSULE ORAL at 16:17

## 2017-12-15 RX ADMIN — ONDANSETRON 4 MG: 4 TABLET, ORALLY DISINTEGRATING ORAL at 16:17

## 2017-12-15 RX ADMIN — ENOXAPARIN SODIUM 40 MG: 40 INJECTION SUBCUTANEOUS at 10:26

## 2017-12-15 RX ADMIN — LISINOPRIL 10 MG: 10 TABLET ORAL at 10:26

## 2017-12-15 RX ADMIN — CEFTRIAXONE 2000 MG: 2 INJECTION, SOLUTION INTRAVENOUS at 16:17

## 2017-12-15 RX ADMIN — Medication 250 MG: at 18:34

## 2017-12-15 RX ADMIN — LEVOTHYROXINE SODIUM 50 MCG: 50 TABLET ORAL at 05:40

## 2017-12-15 RX ADMIN — GADOBUTROL 6 ML: 604.72 INJECTION INTRAVENOUS at 19:21

## 2017-12-15 NOTE — CASE MANAGEMENT
[address] [facility] in the [facility] by Arnold Ulrich for 2017  Network Utilization Review Department  Phone: [phone]; Fax [phone]  ATTENTION: The Network Utilization Review Department is now centralized for our 7 Facilities  Make a note that we have a new phone and fax numbers for our Department  Please call with any questions or concerns to [phone] and carefully follow the prompts so that you are directed to the right person  All voicemails are confidential  Fax any determinations, approvals, denials, and requests for initial or continue stay review clinical to [phone]  Due to HIGH CALL volume, it would be easier if you could please send faxed requests to expedite your requests and in part, help us provide discharge notifications faster  Continued Stay Review    Date: 12/15/17 Friday ACUTE MED SURG LEVEL OF CARE    Vital Signs: /79   Pulse 100   Temp 98 1 °F (36 7 °C) (Oral)   Resp 18   Ht 5' 7" (1 702 m)   Wt 63 kg (138 lb 14 2 oz)   SpO2 96%   BMI 21 75 kg/m²      :   Temp (24hrs), Av 3 °F (36 8 °C), Min:97 6 °F (36 4 °C), Max:98 9 °F (37 2 °C)   HR:  [] 103  Resp:  [18] 18  BP: (131-179)/(79-85) 179/79  SpO2:  [92 %-94 %] 94 %  Body mass index is 21 79 kg/m²       Input and Output Summary (last 24 hours):   Last data filed at 17 1301    Gross per 24 hour   Intake              780 ml   Output             1600 ml   Net             -820 ml       Diet Jason/CHO Controlled; Consistent Carbohydrate Diet Level 2 (5 carb servings/75 grams CHO/meal);  Vegetarian     Dietary nutrition supplements Glucerna TID with Meals      IV ACCESS    Medications:   Scheduled Meds:   cefTRIAXone 2,000 mg Intravenous Q24H   enoxaparin 40 mg Subcutaneous Q24H SILVINO   insulin lispro 1-5 Units Subcutaneous TID AC   insulin lispro 1-5 Units Subcutaneous HS   levothyroxine 50 mcg Oral Early Morning   lisinopril 10 mg Oral Q12H SILVINO   tamsulosin 0 4 mg Oral Daily With Dinner     PRN Meds:     acetaminophen    calcium carbonate    ondansetron    oxyCODONE    senna-docusate sodium    sodium chloride (PF)      LABS/Diagnostic Results:  CBC  Results from last 7 days  Lab Units 12/14/17  0555   WBC Thousand/uL 13 12*   HEMOGLOBIN g/dL 11 1*   HEMATOCRIT % 32 6*   PLATELETS Thousands/uL 236   NEUTROS PCT % 77*   LYMPHS PCT % 12*   MONOS PCT % 10   EOS PCT % 1      CMP  Results from last 7 days  Lab Units 12/14/17  0555   SODIUM mmol/L 137   POTASSIUM mmol/L 3 3*   CHLORIDE mmol/L 105   CO2 mmol/L 26   BUN mg/dL 9   CREATININE mg/dL 0 86   CALCIUM mg/dL 8 6   TOTAL PROTEIN g/dL 6 2*   BILIRUBIN TOTAL mg/dL 0 67   ALK PHOS U/L 111   ALT U/L 66   AST U/L 26   GLUCOSE RANDOM mg/dL 103      Lab Units 12/09/17  2354   INR   1 34*     Blood Culture:   Lab Results   Component Value Date     BLOODCX No Growth at 72 hrs  12/10/2017     BLOODCX No Growth After 4 Days  12/10/2017     BLOODCX No Growth After 4 Days  12/09/2017     BLOODCX No Growth After 4 Days  12/09/2017         Age/Sex: 65 y o  male     Assessment/Plan (per recent Int Med MD progress note):   Assessment:   Principal Problem:    Severe sepsis ([facility] )  Active Problems:    Type 2 diabetes mellitus with hyperglycemia ([facility] )    HTN (hypertension)    ALDEN (acute kidney injury) ([facility] )    Liver mass    Cholelithiasis with biliary obstruction S/P Cholecystectomy 6 weeks ago    Anemia    Hypokalemia      Plan:   1   Severe sepsis, present on admission in [facility] [place], suspect secondary to early hepatic infection:  Infectious disease doctor on board  Continue with present intravenous antibiotic  Follow final results of the cultures and liver biopsy cultures and adjust antibiotics as needed  Monitor CBC with differential count and CMP      2  Hypodense liver mass, previous biopsy appears consistent with abscess    However, we cannot totally rule out possibility of malignancy; patient had recent episode of cholecystitis and cholangitis with biliary obstruction status post ERCP with stenting; liver function tests had improved: Follow-up final liver biopsy cultures  Infectious Disease doctor is recommending a repeat CT scan with contrast to see if anything has liquified  However, according to patient's outpatient doctors, patient had anaphylaxis with IV dye and iodine before  Thus, they asked me if an MRI may be ordered instead  I spoke to our advance practitioner for GI and from our discussion, will order an MRI of the abdomen  This even though that patient had a CT scan with an IV dye here without any significant allergic or adverse reaction  Infectious Disease doctor is also okay with the MRI of the abdomen  Patient's outpatient doctors were also asking me if patient stents need to be removed at this point prior to patient going back to [place] and if patient needs a repeat ERCP at this point  We will have GI to be on board      3  Acute kidney injury, resolved, likely due to patient's previous sepsis and hypotension:  Monitor patient's BMP and input and output      4   Diabetes mellitus type 2 with hyperglycemia[de-identified]  Continue insulin treatment  Will adjust this accordingly  Blood sugars are acceptable levels  Continue holding off metformin      5  Anemia, stable, possible hemodilution from IV fluids; no active bleeding:  Recheck patient's CBC  For further workup and management if this worsens significantly      6  Hypokalemia:  Replace patient's potassium      7  Poor oral intake with dysphagia with some coughing with eating and taking fluids: Will have speech therapist to evaluate this patient as well as GI evaluation      Note:  Patient had an episode of loose stools today  If this progress or worsens to keith diarrhea, will send stools for C diff PCR        VTE Pharmacologic Prophylaxis:   Pharmacologic: Enoxaparin (Lovenox)  Mechanical: Mechanical VTE prophylaxis in place       Current Patient Status: Inpatient   Certification Statement: The patient will continue to require additional inpatient hospital stay due to Above findings and plans         Infectious Disease  Progress Notes Date of Service: 12/14/2017 10:07 AM     Impression/Plan:  1   Severe sepsis-POA at [facility], leukocytosis, elevated lactate  Suspect secondary to early hepatic infection    No other clear sources appreciated  Thus far the patient's blood cultures have remained negative and he has become hemodynamically stable and nontoxic   He seems to be tolerating the antibiotics without difficulty   The lactate level normalized  The fever seems to be resolving  -continue ceftriaxone for now  -follow-up final blood cultures and liver biopsy cultures and adjust antibiotics as needed  -monitor CBC with diff and CMP  -supportive care  -additional workup as below     2   Hypodense liver mass-Biopsy appears consistent with abscess  Cannot exclude adjacent malignancy   With the patient having no liver mass back in late October, it is unlikely that this represents of malignancy   This more likely represents an inflammatory/infectious process like a phlegmon that has yet to become liquified into an abscess   No other clear source is appreciated  -follow up final liver biopsy cultures  -antibiotics as above for now  -recommend repeat CT of the abdomen with contrast to see if anything is liquified  -no additional ID workup for now     3   Acute kidney injury-likely secondary to sepsis with hypotension   Pre renal issues may have significantly played a role   The renal function has improved and now stabilized  -monitor the GFR  -no need to dose adjust ceftriaxone for renal function  -no additional ID workup for now     4   Recent cholecystitis/cholangitis-with biliary obstruction   Status post ERCP with stenting   Possibly all related to 1   Transaminases are elevated however there does not appear to be an obstructive pattern, and imaging does not reveal any ongoing obstruction   the liver function tests have significantly improved    -monitor liver function test  -no additional ID workup for now  -outpatient GI follow-up     5   Diabetes mellitus-type 2 with hyperglycemia     Antibiotics:  Ceftriaxone 3  Antibiotics 6      Discharge Plan:   [address] CLEARED    PER PT TODAY 12/1/5/17  Plan   Progress Discontinue PT   Recommendation   Recommendation Home with family support       CASE MANAGEMENT FOLLOWING CLOSELY FOR ALL DISCHARGE NEEDS

## 2017-12-15 NOTE — SPEECH THERAPY NOTE
Speech Language/Pathology  Speech/Language Pathology  Assessment    Patient Name: Antoinette Lu  HOIVT'X Date: 12/15/2017     Problem List  Patient Active Problem List   Diagnosis    Severe sepsis [facility] )    Type 2 diabetes mellitus with hyperglycemia ([facility] )    HTN (hypertension)    ALDEN (acute kidney injury) ([facility] )    Hypothyroidism    BPH (benign prostatic hyperplasia)    Liver mass    Cholelithiasis with biliary obstruction S/P Cholecystectomy 6 weeks ago    Anemia    Leukocytosis with bandemia    Hypokalemia     Past Medical History  Past Medical History:   Diagnosis Date    BPH (benign prostatic hyperplasia)     Diabetes mellitus ([facility] )     Hypertension     Hypothyroidism      Past Surgical History  Past Surgical History:   Procedure Laterality Date    APPENDECTOMY      CHOLECYSTECTOMY      biliary stent placement          3:44 PM      Attestation signed by Renuka Boyce DO at 12/10/2017 6:30 PM   Patient seen and examined  All labs and diagnostic data reviewed  I have directed the care of this patient on this date  I agree with the documentation in the resident's note with the following addendum:  ACTIVE ISSUES:  Status post CBD stent placement followed by lap cholecystectomy on October 25, 2017     - Procedure for acute cholecystitis    - at [place] [address] (sx: Rayne Armijo [phone] (o), [phone](J))  Sepsis present on admission  4 4 x 4 2 x 4 5 cm right anterior hepatic mass consistent with abscess after cholecystectomy, but MRI findings suggestive of solid mass  1 7 x 1 3 cm posterior right hepatic lobe nodule suggestive of cavernous hemangioma  1 7 x 1 2 cm left adrenal nodule  Diabetes mellitus type 2  Hyponatremia  Acute kidney injury  BPH  History of hypertension  hypothyroidism  IMPRESSION/PLAN:  Patient transferred from [address] [place] [address] for ongoing management of sepsis and hepatic abscess    On arrival, the patient is awake alert and oriented, no acute distress, hemodynamically stable, with resolution of sepsis endpoints and creatinine returned to within normal limits  CT abdomen and pelvis obtained at [facility] was reviewed  Evidence of a hepatic mass suspicious for abscess  There is no evidence of biliary dilation or retained stone  The patient's bilirubin is mildly elevated  All cultures were obtained and are pending  The patient was started on cefepime and vancomycin  Via epic, I reviewed the MRI report from pt's admission at [facility]   Findings consistent with cholecystitis without biliary dilation were present, but there was no evidence of the larger right anterior hepatic mass on MRI  Given the patient's septic presentation and this new mass, it is consistent with hepatic abscess  The R hepatic nodule and adrenal findings were noted on this study  I discussed the case and reviewed imaging with Interventional Radiology  They will be placing a percutaneous drain for cultures and drainage of the abscess  I have transitioned the patient to Zosyn and vancomycin for anaerobic coverage  As noted, plan for IR drainage, ongoing antibiotic therapy  After recovery from the acute phase of illness, a follow-up plan will be developed as the patient would like to return for follow-up with his primary surgeon and gastroenterologist in [place] [address]  T  The [facility] resident attempted to contact the patient's gastroenterologist and I attempted to contact his primary surgeon  Messages were left with both, will re-attempt tomorrow  Management of the patient's comorbidities as per the resident's note  Additionally will require follow-up MRI in 3 months and outpatient follow-up for the 1 7 x 1 3 cm right hepatic nodule and left adrenal nodule  Critical care time equals 55 minutes exclusive of teaching and procedures        Expand All Collapse All    []Hide copied text       Reason for Admission / Chief Complaint: sepsis secondary to liver mass/abscess  History of Present Illness:  Edward Goodman is a 65 y o  male w/ hx of recent cholecystectomy w/ biliary stent placement 10/25/17 presents with fevers/chills/rigors that bega two days ago  Patient states he started to feel unwell, and would periodically shake from being febrile  Tried tylenol with no alleviation of symptoms  Patient thought maybe he got sick on the plane ride, as patient is visiting from [place] for a family wedding  Patient this morning felt particularly worse, and was brought to [place], where patient was found to be septic, with tachycardia, soft pressures, and febrile  IR imaginag found a hepaic mass in right liver lobe, and an abscess could not be ruled out  Transferred to [facility] for further management and IR drainage      On arrival, patient states he feels fine, denies any current nausea/vomiting, mild abdominal pain  Patient states he is to have his stents removed 12/20/17  When asked about why patient had cholecystectomy, patient states he had biliary sludge as well as many gallstones      Patient had stent placed at [facility] in [place]  The patient was unable to recall the GI surgeon's name  His gastroenterologist was Dr Brittany Canas [phone]  Attempted to call the GI physician for further information, and left message for [facility], Dr Dylan Lombardi advanced practitioner, but no one was able to be reached for further information  Also called [facility], [phone], but  was unable to transfer us to the surgeon who performed the patient's surgery, since medical records is closed on a Sunday  Per SL[facility] note 12/14:  Subjective:   Patient tells me that he still not doing fine  He tells me that he has problems eating particularly with swallowing  Thus he still does not eat that much  Patient also still has occasional pains at the right upper quadrant area of the abdomen    Patient has occasional nausea but no actual vomiting episodes  Patient also has been having coughing on swallowing and admits to occasional shortness of breath  Patient also had an episode of loose stools today  Otherwise no other complaints      Reason for consult:  R/o aspiration  Determine safest and least restrictive diet  h/o dysphagia   C/o solid food dysphagia  Current diet:  regular  Premorbid diet[de-identified]  regular  Previous VBS:  none  O2 requirement:  none  Voice/Speech:  wnl  Social:  home  Cognitive Status:  Alert and oriented  Oral mech exam:  Full symmetry  WNL    Pt was feeding self yogurt when I entered  Oral stage: WNL  Pharyngeal stage: WNL  Swallow promptness: prompt  Laryngeal rise:wnl  Wet voice:no  Throat clear:no  Cough:no  Secondary swallows:no  Audible swallows:no  No s/s aspiration    Esophageal stage:  Pt reported he was having swallowing problems but they have resolved for the most part  He stated he would swallow food and it would just "stop"  Stated he had to drink water to get it to go down "especially bread"  Reports this has gotten better  Summary:  Pt presents w/ no s/s oral/pharyngeal dysphagia  Complaints are esophageal in nature  He states it has gotten better  (see esophageal stage above)  Nurse has noted no difficulties  Recommendations:  D/c ST  Diet: add moisture to foods as needed  Avoid dry/dense foods  Liquid:thin  Meds:as tolerated/desired  Positioning:Upright  Strategies: alternate w/ liquids     Reflux precautions    Results d/w:  Pt, nurse    eval only

## 2017-12-15 NOTE — PROGRESS NOTES
[facility] Progress Note  Patient: Seng Montez 65 y o  male   MRN: [id number]  PCP: No primary care provider on file  Unit/Bed#: [facility] Encounter: [id number]  Date Of Visit: 12/15/17    Assessment:    Principal Problem:    Severe sepsis (HCC)  Active Problems:    Type 2 diabetes mellitus with hyperglycemia (HCC)    HTN (hypertension)    ALDEN (acute kidney injury) ([address] )    Liver mass    Cholelithiasis with biliary obstruction S/P Cholecystectomy 6 weeks ago    Anemia    Hypokalemia      Plan:    1  Severe sepsis, present on admission in [facility] [place], suspect secondary to early hepatic infection:  Infectious disease doctor on board  Continue with present intravenous antibiotic  Monitor CBC with differential count and CMP      2  Hypodense liver mass, previous biopsy reveals consistent with abscess with areas of acute inflammation, necrosis and hemorrhage  However, according to the report cannot totally rule out possibility of malignancy; patient had recent episode of cholecystitis and cholangitis with biliary obstruction status post ERCP with stenting; liver function tests had improved:  GI doctor on board  Case was discussed with them  Patient is for MRI of the abdomen today  According to GI, for removal of stents next week, likely Tuesday or Wednesday       3  Acute kidney injury, resolved, likely due to patient's previous sepsis and hypotension:  Monitor patient's BMP and input and output      4   Diabetes mellitus type 2 with hyperglycemia:  Continue insulin treatment  Will adjust this accordingly  Blood sugars are acceptable levels  Continue holding off metformin      5  Anemia, stable and better, possible hemodilution from IV fluids; no active bleeding:  Recheck patient's CBC  For further workup and management if this worsens significantly      6  Hypokalemia, resolved:  Replace patient's potassium p r n        7    Poor appetite, improving; no more dysphagia at this point:  Continue nutritional supplements  Continue present diet  8   No more loose stools today  Patient told me that he had solid stools last night and today  However, he mentioned to me that his stools were "black/dark" in color:  Case discussed with advance practitioner for GI  Will observe this in the meantime  Patient will have EGD/ERCP next week  For further workup and management if patient's hemoglobin goes down significantly  I will send for occult blood tests       9  Nausea:  Patient and patient's family as for prophylactic doses of antiemetic medication prior to eating  Case discussed with GI  Will provide patient with Zofran prior to eating  VTE Pharmacologic Prophylaxis:   Pharmacologic: Enoxaparin (Lovenox): I will hold this in the meantime due to reports of black stools which could be melena  Mechanical: Mechanical VTE prophylaxis in place  Discussions with Specialists or Other Care Team Provider:  Patient's nurse  Case management  GI team with Dr Connie Guerrero and advance practitioners  Education and Discussions with Family / Patient:  Patient  Patient's family at bedside  Patient's outside doctors on the phone  I gave updates for today  I discussed the plans  I answered all questions and concerns  Time Spent for Care: 45 minutes  More than 50% of total time spent on counseling and coordination of care as described above  Current Length of Stay: 5 day(s)    Current Patient Status: Inpatient   Certification Statement: The patient will continue to require additional inpatient hospital stay due to Above findings and plans    Discharge Plan:  None yet  Code Status: Level 1 - Full Code      Subjective:   Patient tells me that he feels better today  However, patient still has occasional pains at the right upper quadrant abdominal area but not as bad as the previous days  He told me that his pains when it occur is just intensity 2/10 now    Patient having nausea when he is about to eat  But no actual vomiting episodes  No more loose stools  He told me that he had solid stools last night and today  However, he mentioned to me that his stools were dark/black in color  Patient told me that his appetite is slowly coming back and in fact he told me that he ate more today compared to previous days  No other pains  No shortness of breath  No other complaints  Objective:     Vitals:   Temp (24hrs), Av 5 °F (36 9 °C), Min:97 8 °F (36 6 °C), Max:99 5 °F (37 5 °C)    HR:  [] 100  Resp:  [16-18] 18  BP: (160-170)/(74-84) 170/79  SpO2:  [94 %-96 %] 96 %  Body mass index is 21 75 kg/m²  Input and Output Summary (last 24 hours): Intake/Output Summary (Last 24 hours) at 12/15/17 1219  Last data filed at 12/15/17 1100   Gross per 24 hour   Intake              880 ml   Output             1325 ml   Net             -445 ml       Physical Exam:     Physical Exam   Constitutional: He is oriented to person, place, and time  No distress  HENT:   Head: Normocephalic and atraumatic  Eyes: Right eye exhibits no discharge  Left eye exhibits no discharge  No scleral icterus  Neck: No JVD present  No tracheal deviation present  Cardiovascular: Normal rate, regular rhythm and normal heart sounds  Exam reveals no gallop and no friction rub  No murmur heard  Pulmonary/Chest: Effort normal and breath sounds normal  No stridor  No respiratory distress  He has no wheezes  He has no rales  Abdominal: Soft  Bowel sounds are normal  He exhibits no distension  There is tenderness  There is no rebound and no guarding  Positive for right upper quadrant tenderness  Musculoskeletal: He exhibits no edema, tenderness or deformity  Neurological: He is alert and oriented to person, place, and time  No cranial nerve deficit  Skin: Skin is warm  No rash noted  He is not diaphoretic  No erythema  No pallor  Psychiatric: He has a normal mood and affect  His behavior is normal  Thought content normal    Patient is pleasant  Vitals reviewed  Additional Data:     Labs:      Results from last 7 days  Lab Units 12/15/17  0606 12/14/17  0555   WBC Thousand/uL 13 49* 13 12*   HEMOGLOBIN g/dL 11 8* 11 1*   HEMATOCRIT % 35 2* 32 6*   PLATELETS Thousands/uL 280 236   NEUTROS PCT %  --  77*   LYMPHS PCT %  --  12*   MONOS PCT %  --  10   EOS PCT %  --  1       Results from last 7 days  Lab Units 12/15/17  0559   SODIUM mmol/L 138   POTASSIUM mmol/L 3 6   CHLORIDE mmol/L 104   CO2 mmol/L 27   BUN mg/dL 9   CREATININE mg/dL 0 84   CALCIUM mg/dL 8 8   TOTAL PROTEIN g/dL 6 6   BILIRUBIN TOTAL mg/dL 0 46   ALK PHOS U/L 124*   ALT U/L 66   AST U/L 33   GLUCOSE RANDOM mg/dL 119       Results from last 7 days  Lab Units 12/09/17  2354   INR  1 34*       * I Have Reviewed All Lab Data Listed Above  * Additional Pertinent Lab Tests Reviewed: [facility]    Imaging:    Imaging Reports Reviewed Today Include:  Diagnostic imaging studies that were done on this admission  Imaging Personally Reviewed by Myself Includes:  None  Cultures:   Blood Culture:   Lab Results   Component Value Date    BLOODCX No Growth After 4 Days  12/10/2017    BLOODCX No Growth After 4 Days  12/10/2017    BLOODCX No Growth After 4 Days  12/09/2017    BLOODCX No Growth After 4 Days   12/09/2017     Urine Culture: No results found for: URINECX  Sputum Culture: No components found for: SPUTUMCX  Wound Culture: No results found for: WOUNDCULT    Last 24 Hours Medication List:     cefTRIAXone 2,000 mg Intravenous Q24H   enoxaparin 40 mg Subcutaneous Q24H [address]   insulin lispro 1-5 Units Subcutaneous TID AC   insulin lispro 1-5 Units Subcutaneous HS   levothyroxine 50 mcg Oral Early Morning   lisinopril 10 mg Oral Q12H [address]   tamsulosin 0 4 mg Oral Daily With Dinner        Today, Patient Was Seen By: Danny Ross MD    ** Please Note: Dragon 360 Dictation voice to text software may have been used in the creation of this document   **

## 2017-12-15 NOTE — CONSULTS
Consultation - [facility]  Eulogio Hunter 65 y o  male MRN: [id number]  Unit/Bed#: -01 Encounter: [id number]        ASSESSMENT/PLAN:   Liver lesion  Sepsis  Hx choledocholithiasis s/p stent placement & cholecystectomy    Pt was admitted with sepsis on 12/9  He was found to have several liver lesions, one of which was solid & malignancy could not be ruled out  He had a bx consistent with abscess  He has been followed by ID & has been improving  ID recommends repeating MRCP to see if this has "liquidified" vs still solid  He has not yet gone  We'll await the results  Depending on what it shows he may benefit from a tagged WBC scan which will also help differentiate the lesion  This is a 2 day scan & therefore could not be done until Monday  He states he was also to have the stent removed on the 20th  Since he will continue to require Abx & will be in the hospital we can plan to remove this next week  -Follow up on MRCP  -If no change can consider WBC scan on Monday  -ERCP next wk for stent removal      Consults    Reason for Consult / Principal Problem: Severe sepsis [facility])    HPI: Deshaun Kellogg is a 65y o  year old male with a PMH of diabetes, hypothyroid who is s/p ERCP w/ stent placed secondary to choledocholithiasis in Oct in [place]  He then had his GB removed  He states he felt great after & was playing golf 5 days later  He states he came here on 12/8 for a wedding  He states the whole next day he did not feel well, with shaking chills, rigors, fever  He eventually came to the hospital & CT showed multiple liver lesions  He ultimately had bx of one lesion which was consistent with abscess  He has been followed by ID & treated with Abx  He has slowly improved  He denies abd pain  He states he is tolerating a diet  No N/V  + BM  States he had a colonoscopy prior to his cholecystectomy which only showed a few polyps          Review of Systems: as per HPI  Review of Systems All other systems reviewed and are negative        Historical Information   Past Medical History:   Diagnosis Date    BPH (benign prostatic hyperplasia)     Diabetes mellitus ([facility] [address] )     Hypertension     Hypothyroidism      Past Surgical History:   Procedure Laterality Date    APPENDECTOMY      CHOLECYSTECTOMY      biliary stent placement     Social History   History   Alcohol Use    Yes     Comment: occ     History   Drug Use No     History   Smoking Status    Former Smoker    Packs/day: 0 50    Years: 10 00    Types: Cigarettes    Start date: 1965    Quit date: 1975   Smokeless Tobacco    Never Used     Family History   Problem Relation Age of Onset    Hypertension Mother     Dementia Father     Diabetes Brother        Meds/Allergies     Prescriptions Prior to Admission   Medication    Dutasteride-Tamsulosin HCl 0 5-0 4 MG CAPS    levothyroxine 50 mcg tablet    lisinopril (ZESTRIL) 10 mg tablet    metFORMIN (GLUCOPHAGE) 500 mg tablet     Current Facility-Administered Medications   Medication Dose Route Frequency    acetaminophen (TYLENOL) tablet 650 mg  650 mg Oral Q6H PRN    calcium carbonate (TUMS) chewable tablet 1,000 mg  1,000 mg Oral Daily PRN    cefTRIAXone (ROCEPHIN) IVPB (premix) 2,000 mg  2,000 mg Intravenous Q24H    enoxaparin (LOVENOX) subcutaneous injection 40 mg  40 mg Subcutaneous Q24H SILVINO    insulin lispro (HumaLOG) 100 units/mL subcutaneous injection 1-5 Units  1-5 Units Subcutaneous TID AC    insulin lispro (HumaLOG) 100 units/mL subcutaneous injection 1-5 Units  1-5 Units Subcutaneous HS    levothyroxine tablet 50 mcg  50 mcg Oral Early Morning    lisinopril (ZESTRIL) tablet 10 mg  10 mg Oral Q12H SILVINO    ondansetron (ZOFRAN) injection 4 mg  4 mg Intravenous Q6H PRN    oxyCODONE (ROXICODONE) IR tablet 5 mg  5 mg Oral Q4H PRN    senna-docusate sodium (SENOKOT S) 8 6-50 mg per tablet 1 tablet  1 tablet Oral Daily PRN    sodium chloride (PF) 0 9 % injection 3 mL  3 mL Intravenous PRN    tamsulosin (FLOMAX) capsule 0 4 mg  0 4 mg Oral Daily With Dinner       Allergies   Allergen Reactions    Shellfish-Derived Products Anaphylaxis       Objective     Blood pressure 170/79, pulse 100, temperature 98 1 °F (36 7 °C), temperature source Oral, resp  rate 18, height 5' 7" (1 702 m), weight 63 kg (138 lb 14 2 oz), SpO2 96 %  Intake/Output Summary (Last 24 hours) at 12/15/17 1359  Last data filed at 12/15/17 1100   Gross per 24 hour   Intake              700 ml   Output              825 ml   Net             -125 ml       PHYSICAL EXAM     Physical Exam   Constitutional: He is oriented to person, place, and time  He appears well-developed and well-nourished  No distress  HENT:   Head: Normocephalic and atraumatic  Eyes: Conjunctivae are normal    Neck: Normal range of motion  Cardiovascular: Normal rate and regular rhythm  Pulmonary/Chest: Effort normal and breath sounds normal    Abdominal: Soft  Bowel sounds are normal    Mildly TTP RUQ   Musculoskeletal: Normal range of motion  Neurological: He is alert and oriented to person, place, and time  Skin: Skin is warm and dry  Psychiatric: He has a normal mood and affect   His behavior is normal        Lab Results:   CBC: Lab Results   Component Value Date    WBC 13 49 (H) 12/15/2017    HGB 11 8 (L) 12/15/2017    HCT 35 2 (L) 12/15/2017    MCV 80 (L) 12/15/2017     12/15/2017    MCH 26 8 12/15/2017    MCHC 33 5 12/15/2017    RDW 14 1 12/15/2017    MPV 10 5 12/15/2017   ,   CMP: Lab Results   Component Value Date     12/15/2017    K 3 6 12/15/2017     12/15/2017    CO2 27 12/15/2017    ANIONGAP 7 12/15/2017    BUN 9 12/15/2017    CREATININE 0 84 12/15/2017    GLUCOSE 119 12/15/2017    CALCIUM 8 8 12/15/2017    AST 33 12/15/2017    ALT 66 12/15/2017    ALKPHOS 124 (H) 12/15/2017    PROT 6 6 12/15/2017    ALBUMIN 2 2 (L) 12/15/2017    BILITOT 0 46 12/15/2017    EGFR 84 12/15/2017   ,   Imaging Studies: I have personally reviewed pertinent reports  MRI 12/10:  4 5 cm right hepatic lobe mass is indeterminate and restricts diffusion  Although abscess remains in the differential diagnosis, its imaging appearance slightly favor a solid mass  Needle sampling is necessary for further differentiation and therefore   recommended      1 7 cm posterior right hepatic lobe lesion is difficult to characterize given its small size although has imaging features favoring cavernous hemangioma  I would recommend follow-up MRI in 3 months to ensure stability of this finding      1 7 x 1 2 cm indeterminate left adrenal nodule  This does not appear to represent a lipid rich adenoma although could represent a lipid poor adenoma  This can be reevaluated for stability at time of follow-up above

## 2017-12-15 NOTE — PLAN OF CARE
Problem: PHYSICAL THERAPY ADULT  Goal: Performs mobility at highest level of function for planned discharge setting  See evaluation for individualized goals  Treatment/Interventions: LE strengthening/ROM, Elevations, Therapeutic exercise, Endurance training, Gait training, Spoke to nursing          See flowsheet documentation for full assessment, interventions and recommendations  Outcome: Completed Date Met: 12/15/17  Prognosis: Good  Problem List: Decreased endurance  Assessment: PT INITIATED TREATMENT SESSION IN ORDER TO ASSIST PATIENT IN ACHIEIVING GOALS TO IMPROVE ACTIVITY TOLERANCE, AMBULATION, AND STAIR NAVIGATION  PATIENT REPORTS FEELING BETTER AND PERFORMED BED MOBILITY, SIT<-->STAND TRANSFERS AND AMBULATION I  HE AMBULATED 500 FEET W/O AD PRESENTING WITH MILDLY REDUCED GAIT SPEED  HE NAVIGATED 7 STEPS W/ VERBAL CUING TO PERFORM NONRECIPROCALLY W/ R SIDED HAND RAIL  PT PROVIDED EDUCATION ON PLANNING DAY TO AVOID STAIRS MULTIPLE TIMES TO CONSERVE ENERGY WHILE PATIENT PRESENTS WITH DECREASED ENDURANCE  AT THIS TIME PATIENT DEMONSTRATES SAFE AND EFFECTIVE MOBILITY AND DECLINES QUESTIONS/CONCERNS ABOUT D/C HOME  RECOMMEND D/C HOME W/ FAMILY SUPPORT WHEN MED MAYLIN  RECOMMEND CONTINUED SELF AMBULATION  D/C INPT PT  Recommendation: (S) Home with family support     PT - OK to Discharge: (S) Yes (HOME I WHEN MED MAYLIN )    See flowsheet documentation for full assessment     Mars Escudero, PT

## 2017-12-15 NOTE — CASE MANAGEMENT
Notification of Discharge  This is a Notification of Discharge from our facility [address]  Please be advised that this patient has been discharge from our facility  Below you will find the admission and discharge date and time including the patients disposition  PRESENTATION DATE: 12/9/2017 11:28 PM  IP ADMISSION DATE: 12/10/17 0145  DISCHARGE DATE: 12/10/2017  2:07 PM  DISPOSITION: [address] [facility] in the [facility] by Arnold Ulrich for 2017  Network Utilization Review Department  Phone: [phone]; Fax [phone]  ATTENTION: The Network Utilization Review Department is now centralized for our 7 Facilities  Make a note that we have a new phone and fax numbers for our Department  Please call with any questions or concerns to [phone] and carefully follow the prompts so that you are directed to the right person  All voicemails are confidential  Fax any determinations, approvals, denials, and requests for initial or continue stay review clinical to [phone]  Due to HIGH CALL volume, it would be easier if you could please send faxed requests to expedite your requests and in part, help us provide discharge notifications faster

## 2017-12-15 NOTE — PHYSICAL THERAPY NOTE
PT TREATMENT       12/15/17 0814   Pain Assessment   Pain Assessment No/denies pain   Pain Score No Pain   Restrictions/Precautions   Other Precautions Fall Risk   General   Chart Reviewed Yes   Response to Previous Treatment Patient with no complaints from previous session  Family/Caregiver Present No   Cognition   Overall Cognitive Status WFL   Subjective   Subjective "WHEN DO YOU THINK I WILL BE ABLE TO GO HOME"   Bed Mobility   Supine to Sit 6  Modified independent   Additional items Increased time required   Transfers   Sit to Stand 7  Independent   Stand to Sit 7  Independent   Ambulation/Elevation   Gait pattern Excessively slow   Gait Assistance 7  Independent   Assistive Device None   Distance 500 FEET   Stair Management Assistance 5  Supervision   Stair Management Technique One rail R;Foreward;Nonreciprocal   Number of Stairs 7   Balance   Static Sitting Good   Static Standing Fair +   Ambulatory Fair   Activity Tolerance   Activity Tolerance Patient tolerated treatment well   Assessment   Prognosis Good   Problem List Decreased endurance   Assessment PT INITIATED TREATMENT SESSION IN ORDER TO ASSIST PATIENT IN ACHIEIVING GOALS TO IMPROVE ACTIVITY TOLERANCE, AMBULATION, AND STAIR NAVIGATION  PATIENT REPORTS FEELING BETTER AND PERFORMED BED MOBILITY, SIT<-->STAND TRANSFERS AND AMBULATION I  HE AMBULATED 500 FEET W/O AD PRESENTING WITH MILDLY REDUCED GAIT SPEED  HE NAVIGATED 7 STEPS W/ VERBAL CUING TO PERFORM NONRECIPROCALLY W/ R SIDED HAND RAIL  PT PROVIDED EDUCATION ON PLANNING DAY TO AVOID STAIRS MULTIPLE TIMES TO CONSERVE ENERGY WHILE PATIENT PRESENTS WITH DECREASED ENDURANCE  AT THIS TIME PATIENT DEMONSTRATES SAFE AND EFFECTIVE MOBILITY AND DECLINES QUESTIONS/CONCERNS ABOUT D/C HOME  RECOMMEND D/C HOME W/ FAMILY SUPPORT WHEN MED MAYLIN  RECOMMEND CONTINUED SELF AMBULATION  D/C INPT PT     Goals   Patient Goals TO GO HOME   STG Expiration Date 12/18/17   Treatment Day 1   Plan   Progress Discontinue PT Recommendation   Recommendation Home with family support   Equipment Recommended (NONE)   PT - OK to Discharge Yes  (C/Boone Wall )   Janice Mirza, PT

## 2017-12-15 NOTE — PROGRESS NOTES
Progress Note - Infectious Disease   Eulogio Hunter 65 y o  male MRN: [id number]  Unit/Bed#: -01 Encounter: [id number]      Impression/Plan:  1   Severe sepsis-POA at [facility], leukocytosis, elevated lactate  Suspect secondary to early hepatic infection  No other clear sources appreciated  Thus far the patient's blood cultures have remained negative and he has become hemodynamically stable and nontoxic   He seems to be tolerating the antibiotics without difficulty   The lactate level normalized  The fever has resolved  -continue ceftriaxone for now  -follow-up final blood cultures   -monitor CBC with diff and CMP  -supportive care  -additional workup as below     2   Hypodense liver mass-Biopsy appears consistent with abscess  Cannot exclude adjacent malignancy   With the patient having no liver mass back in late October, it is unlikely that this represents of malignancy, however perhaps there is more than 1 process  This more likely represents an inflammatory/infectious process like a phlegmon that has yet to become liquified into an abscess  No other clear source is appreciated  The liver biopsy cultures are negative  -antibiotics as above for now  -await repeat MRI of the liver as patient has a history of a contrast allergy, although he had a CT with contrast without any reaction last week  -no additional ID workup for now  -if abnormality appears to have liquified, recommend repeat attempted drainage     3   Acute kidney injury-likely secondary to sepsis with hypotension   Pre renal issues may have significantly played a role   The renal function has improved and now stabilized  -monitor the GFR  -no need to dose adjust ceftriaxone for renal function  -no additional ID workup for now     4   Recent cholecystitis/cholangitis-with biliary obstruction   Status post ERCP with stenting   Possibly all related to 1   Transaminases are elevated however there does not appear to be an obstructive pattern, and imaging does not reveal any ongoing obstruction  The liver function tests have significantly improved  -monitor liver function test  -no additional ID workup for now  -GI follow-up  -possibly remove stent next week     5   Diabetes mellitus-type 2 with hyperglycemia    Antibiotics:  Ceftriaxone 4  Antibiotics 6  Subjective:  Patient has no fever, chills, sweats; no nausea, vomiting, diarrhea; no cough, shortness of breath; no pain  No new symptoms  He is feeling much better overall  His appetite is a bit better  Objective:  Vitals:  HR:  [] 100  Resp:  [16-18] 18  BP: (160-170)/(74-84) 170/79  SpO2:  [95 %-96 %] 96 %  Temp (24hrs), Av 5 °F (36 9 °C), Min:97 8 °F (36 6 °C), Max:99 5 °F (37 5 °C)  Current: Temperature: 98 1 °F (36 7 °C)    Physical Exam:   General Appearance:  Alert, nontoxic, no acute distress  Throat: Oropharynx moist without lesions  Lungs:   Clear to auscultation bilaterally; respirations unlabored   Heart:  RRR; no murmur, rub or gallop   Abdomen:   Soft, non-tender, non-distended, positive bowel sounds  Extremities: No clubbing, cyanosis or edema   Skin: No new rashes or lesions  No draining wounds noted         Labs, Imaging, & Other studies:   All pertinent labs and imaging studies were personally reviewed    Results from last 7 days  Lab Units 12/15/17  0606 17  0555 17  0616   WBC Thousand/uL 13 49* 13 12* 17 06*   HEMOGLOBIN g/dL 11 8* 11 1* 12 7   PLATELETS Thousands/uL 280 236 231       Results from last 7 days  Lab Units 12/15/17  0559 17  0555 17  0616   SODIUM mmol/L 138 137 137   POTASSIUM mmol/L 3 6 3 3* 3 1*   CHLORIDE mmol/L 104 105 104   CO2 mmol/L 27 26 24   ANION GAP mmol/L 7 6 9   BUN mg/dL 9 9 13   CREATININE mg/dL 0 84 0 86 1 05   EGFR ml/min/1 73sq m 84 83 68   GLUCOSE RANDOM mg/dL 119 103 118   CALCIUM mg/dL 8 8 8 6 8 8   AST U/L 33 26 36   ALT U/L 66 66 105*   ALK PHOS U/L 124* 111 139*   TOTAL PROTEIN g/dL 6 6 6 2* 7 2   ALBUMIN g/dL 2 2* 2 1* 2 4*   BILIRUBIN TOTAL mg/dL 0 46 0 67 0 96       Results from last 7 days  Lab Units 12/10/17  2030 12/10/17  1202 12/10/17  0917 12/09/17  8844   BLOOD CULTURE   --  No Growth After 4 Days  No Growth After 5 Days  No Growth After 5 Days  No Growth After 5 Days     GRAM STAIN RESULT  No Polys or Bacteria seen  --   --   --    BODY FLUID CULTURE, STERILE  No growth  --   --   --

## 2017-12-16 LAB
ALBUMIN SERPL BCP-MCNC: 2.2 G/DL (ref 3.5–5)
ALP SERPL-CCNC: 118 U/L (ref 46–116)
ALT SERPL W P-5'-P-CCNC: 56 U/L (ref 12–78)
ANION GAP SERPL CALCULATED.3IONS-SCNC: 6 MMOL/L (ref 4–13)
AST SERPL W P-5'-P-CCNC: 26 U/L (ref 5–45)
BILIRUB SERPL-MCNC: 0.43 MG/DL (ref 0.2–1)
BUN SERPL-MCNC: 10 MG/DL (ref 5–25)
CALCIUM SERPL-MCNC: 8.7 MG/DL (ref 8.3–10.1)
CHLORIDE SERPL-SCNC: 102 MMOL/L (ref 100–108)
CO2 SERPL-SCNC: 30 MMOL/L (ref 21–32)
CREAT SERPL-MCNC: 0.84 MG/DL (ref 0.6–1.3)
ERYTHROCYTE [DISTWIDTH] IN BLOOD BY AUTOMATED COUNT: 14.4 % (ref 11.6–15.1)
GFR SERPL CREATININE-BSD FRML MDRD: 84 ML/MIN/1.73SQ M
GLUCOSE SERPL-MCNC: 116 MG/DL (ref 65–140)
GLUCOSE SERPL-MCNC: 130 MG/DL (ref 65–140)
GLUCOSE SERPL-MCNC: 180 MG/DL (ref 65–140)
GLUCOSE SERPL-MCNC: 189 MG/DL (ref 65–140)
GLUCOSE SERPL-MCNC: 194 MG/DL (ref 65–140)
HCT VFR BLD AUTO: 32.5 % (ref 36.5–49.3)
HCT VFR BLD AUTO: 35.8 % (ref 36.5–49.3)
HGB BLD-MCNC: 10.7 G/DL (ref 12–17)
HGB BLD-MCNC: 12.1 G/DL (ref 12–17)
MCH RBC QN AUTO: 26.7 PG (ref 26.8–34.3)
MCHC RBC AUTO-ENTMCNC: 32.9 G/DL (ref 31.4–37.4)
MCV RBC AUTO: 81 FL (ref 82–98)
PLATELET # BLD AUTO: 345 THOUSANDS/UL (ref 149–390)
PMV BLD AUTO: 11 FL (ref 8.9–12.7)
POTASSIUM SERPL-SCNC: 3.5 MMOL/L (ref 3.5–5.3)
PROT SERPL-MCNC: 6.4 G/DL (ref 6.4–8.2)
RBC # BLD AUTO: 4.01 MILLION/UL (ref 3.88–5.62)
SODIUM SERPL-SCNC: 138 MMOL/L (ref 136–145)
WBC # BLD AUTO: 10.07 THOUSAND/UL (ref 4.31–10.16)

## 2017-12-16 PROCEDURE — 82948 REAGENT STRIP/BLOOD GLUCOSE: CPT

## 2017-12-16 PROCEDURE — 85027 COMPLETE CBC AUTOMATED: CPT | Performed by: INTERNAL MEDICINE

## 2017-12-16 PROCEDURE — C9113 INJ PANTOPRAZOLE SODIUM, VIA: HCPCS | Performed by: INTERNAL MEDICINE

## 2017-12-16 PROCEDURE — 85018 HEMOGLOBIN: CPT | Performed by: INTERNAL MEDICINE

## 2017-12-16 PROCEDURE — 80053 COMPREHEN METABOLIC PANEL: CPT | Performed by: INTERNAL MEDICINE

## 2017-12-16 PROCEDURE — 85014 HEMATOCRIT: CPT | Performed by: INTERNAL MEDICINE

## 2017-12-16 RX ORDER — PANTOPRAZOLE SODIUM 40 MG/1
40 INJECTION, POWDER, FOR SOLUTION INTRAVENOUS EVERY 12 HOURS SCHEDULED
Status: DISCONTINUED | OUTPATIENT
Start: 2017-12-16 | End: 2017-12-18

## 2017-12-16 RX ORDER — PANTOPRAZOLE SODIUM 40 MG/1
40 INJECTION, POWDER, FOR SOLUTION INTRAVENOUS ONCE
Status: COMPLETED | OUTPATIENT
Start: 2017-12-16 | End: 2017-12-16

## 2017-12-16 RX ADMIN — INSULIN LISPRO 1 UNITS: 100 INJECTION, SOLUTION INTRAVENOUS; SUBCUTANEOUS at 16:27

## 2017-12-16 RX ADMIN — ONDANSETRON 4 MG: 4 TABLET, ORALLY DISINTEGRATING ORAL at 16:22

## 2017-12-16 RX ADMIN — ONDANSETRON 4 MG: 4 TABLET, ORALLY DISINTEGRATING ORAL at 05:07

## 2017-12-16 RX ADMIN — LISINOPRIL 10 MG: 10 TABLET ORAL at 08:08

## 2017-12-16 RX ADMIN — LEVOTHYROXINE SODIUM 50 MCG: 50 TABLET ORAL at 05:06

## 2017-12-16 RX ADMIN — CEFTRIAXONE 2000 MG: 2 INJECTION, SOLUTION INTRAVENOUS at 15:03

## 2017-12-16 RX ADMIN — Medication 250 MG: at 08:08

## 2017-12-16 RX ADMIN — Medication 250 MG: at 18:00

## 2017-12-16 RX ADMIN — INSULIN LISPRO 1 UNITS: 100 INJECTION, SOLUTION INTRAVENOUS; SUBCUTANEOUS at 11:15

## 2017-12-16 RX ADMIN — TAMSULOSIN HYDROCHLORIDE 0.4 MG: 0.4 CAPSULE ORAL at 16:22

## 2017-12-16 RX ADMIN — ONDANSETRON 4 MG: 4 TABLET, ORALLY DISINTEGRATING ORAL at 11:15

## 2017-12-16 RX ADMIN — INSULIN LISPRO 1 UNITS: 100 INJECTION, SOLUTION INTRAVENOUS; SUBCUTANEOUS at 22:09

## 2017-12-16 RX ADMIN — LISINOPRIL 10 MG: 10 TABLET ORAL at 20:48

## 2017-12-16 RX ADMIN — PANTOPRAZOLE SODIUM 40 MG: 40 INJECTION, POWDER, FOR SOLUTION INTRAVENOUS at 17:55

## 2017-12-16 NOTE — PROGRESS NOTES
Progress Note - Morgan Alba 65 y o  male MRN: [id number]    Unit/Bed#: -01 Encounter: [id number]        Subjective:     He is doing well  Reported 1 episode of black stool  Objective:     Vitals: Blood pressure 163/72, pulse 105, temperature 97 8 °F (36 6 °C), temperature source Oral, resp  rate 18, height 5' 7" (1 702 m), weight 64 7 kg (142 lb 10 2 oz), SpO2 100 %  ,Body mass index is 22 34 kg/m²  Intake/Output Summary (Last 24 hours) at 12/16/17 1749  Last data filed at 12/16/17 1125   Gross per 24 hour   Intake              880 ml   Output             2350 ml   Net            -1470 ml       Physical Exam:     General Appearance: Alert, appears stated age and cooperative  Lungs: Clear to auscultation bilaterally, no rales or rhonchi, no labored breathing/accessory muscle use  Heart: Regular rate and rhythm, S1, S2 normal, no murmur, click, rub or gallop  Abdomen: Soft, non-tender, non-distended; bowel sounds normal; no masses or no organomegaly  Extremities: No cyanosis, edema    Invasive Devices     Peripheral Intravenous Line            Peripheral IV 12/14/17 Left Forearm 2 days                Lab Results:      Results from last 7 days  Lab Units 12/16/17  1711 12/16/17  0458  12/14/17  0555   WBC Thousand/uL  --  10 07  < > 13 12*   HEMOGLOBIN g/dL 12 1 10 7*  < > 11 1*   HEMATOCRIT % 35 8* 32 5*  < > 32 6*   PLATELETS Thousands/uL  --  345  < > 236   NEUTROS PCT %  --   --   --  77*   LYMPHS PCT %  --   --   --  12*   MONOS PCT %  --   --   --  10   EOS PCT %  --   --   --  1   < > = values in this interval not displayed      Results from last 7 days  Lab Units 12/16/17  0458   SODIUM mmol/L 138   POTASSIUM mmol/L 3 5   CHLORIDE mmol/L 102   CO2 mmol/L 30   BUN mg/dL 10   CREATININE mg/dL 0 84   CALCIUM mg/dL 8 7   TOTAL PROTEIN g/dL 6 4   BILIRUBIN TOTAL mg/dL 0 43   ALK PHOS U/L 118*   ALT U/L 56   AST U/L 26   GLUCOSE RANDOM mg/dL 116       Results from last 7 days  Lab Units 12/09/17  2354   INR  1 34*       Results from last 7 days  Lab Units 12/09/17  2354   LIPASE u/L 98       Imaging Studies: I have personally reviewed pertinent imaging studies  Xr Chest 2 Views    Result Date: 12/10/2017  Impression: No active pulmonary disease  Workstation performed: [id number]     Mri Abdomen W Wo Contrast    Result Date: 12/10/2017  Impression: 4 5 cm right hepatic lobe mass is indeterminate and restricts diffusion  Although abscess remains in the differential diagnosis, its imaging appearance slightly favor a solid mass  Needle sampling is necessary for further differentiation and therefore recommended  1 7 cm posterior right hepatic lobe lesion is difficult to characterize given its small size although has imaging features favoring cavernous hemangioma  I would recommend follow-up MRI in 3 months to ensure stability of this finding  1 7 x 1 2 cm indeterminate left adrenal nodule  This does not appear to represent a lipid rich adenoma although could represent a lipid poor adenoma  This can be reevaluated for stability at time of follow-up above  I personally discussed this study and recommendations with Dr Lety Bowen on 12/10/2017 1:06 PM  Workstation performed: [id number]     Mri Abdomen W Wo Contrast And Mrcp    Result Date: 12/15/2017  Impression: 1   4 2 cm heterogeneously enhancing, lobulated lesion in segment VIII of the right hepatic lobe shows no significant change in size  The current study more conspicuously demonstrates areas of central enhancement that remain concerning for solid component  From an imaging standpoint, organizing abscess and metastatic lesion are still both differential considerations, though prior biopsy results are most suggestive of abscess  2   12 mm lesion in segment VII is most likely hemangioma though lack of discontinuity in early rim enhancement is atypical   Previous recommendation for 3 month follow-up is further supported   3   17 mm indeterminate left adrenal nodule is unchanged from prior  Workstation performed: [id number]     Ct Needle Biopsy Liver    Result Date: 12/11/2017  Impression: Impression: Successful CT-guided biopsy  Attempted drainage which was not possible as this is a solid lesion  Workstation performed: [id number]     Ct Abdomen Pelvis With Contrast    Result Date: 12/10/2017  Impression: 1  Enhancing hepatic lesions as described above  Differential would include atypical hemangioma, hepatic neoplasm, either primary or metastatic or hepatic abscess, given the history of fever and recent gallbladder surgery  It is recommended the patient have a follow-up MRI of the liver with gadolinium, for further evaluation  2   Prostatomegaly with probable bladder outlet obstruction  Correlation with serum PSA  3   Colonic diverticulosis  Findings are consistent with the preliminary report from [facility] which was provided shortly after completion of the exam              Workstation performed: [id number]       ASSESMENT/ PLAN:     18-year-old male admitted with sepsis and likely liver abscess status post recent ERCP for choledocholithiasis  1   Hypodense liver lesion -likely liver abscess secondary to recent instrumentation  Biopsy was negative for malignancy  He had repeat MRI today which showed stable 4 2 cm hypodense lesion cannot rule out underlying solid mass  He is afebrile on antibiotics  His LFTs are normal  Continue to monitor white cell count and recommend repeat imaging in few weeks  2   Choledocholithiasis status post ERCP with stent placement at outside hospital   He will need repeat ERCP with stent removal if the duct was not completely cleared  I will call his gastroenterologist Dr Brittany Menendez [phone] on Monday to clarify that  3   Melena-slight drop in hemoglobin but repeat hemoglobin is up to 12  1  Protonix 40 mg IV b i d  Regular diet  Monitor hemoglobin    EGD for further evaluation will be done on Tuesday  If he continues to have further bleeding or drop in hemoglobin EGD to be done sooner      Discussed the plan with the family her and primary team

## 2017-12-16 NOTE — PROGRESS NOTES
Progress Note - Infectious Disease   Eulogio Hunter 65 y o  male MRN: [id number]  Unit/Bed#: -01 Encounter: [id number]      Impression/Plan:  1   Severe sepsis-POA at [facility] ([place]), leukocytosis, elevated lactate   Suspect secondary to early hepatic infection  No other clear sources appreciated  Thus far the patient's blood cultures have remained negative and he has become hemodynamically stable and nontoxic   He seems to be tolerating the antibiotics without difficulty   The lactate level normalized   The fever has resolved  He was not bacteremic   -continue ceftriaxone for now  -monitor CBC with diff and CMP  -supportive care  -additional workup as below     2   Hypodense liver mass-Biopsy appears consistent with abscess   Cannot exclude adjacent malignancy   With the patient having no liver mass back in late October, it is unlikely that this represents of malignancy, however perhaps there is more than 1 process  This more likely represents an inflammatory/infectious process like a phlegmon that has yet to become liquified into an abscess  No other clear source is appreciated  The liver biopsy cultures are negative  The repeat MRI is once again inconclusive   -antibiotics as above for now  -may need repeat attempt at aspiration/drainage and biopsy  -no additional ID workup for now  -tagged white cell scan as per GI although it is unlikely to change my management      3   Acute kidney injury-likely secondary to sepsis with hypotension   Pre renal issues may have significantly played a role   The renal function has improved and now stabilized  -monitor the GFR  -no need to dose adjust ceftriaxone for renal function  -no additional ID workup for now     4   Recent cholecystitis/cholangitis-with biliary obstruction   Status post ERCP with stenting   Possibly all related to 1   Transaminases are elevated however there does not appear to be an obstructive pattern, and imaging does not reveal any ongoing obstruction  The liver function tests have significantly improved  -monitor liver function test  -no additional ID workup for now  -GI follow-up  -possibly remove stent next week     5   Diabetes mellitus-type 2 with hyperglycemia    Antibiotics:  Ceftriaxone 5  Antibiotics 7    Subjective:  Patient has no fever, chills, sweats; no nausea, vomiting, diarrhea; no cough, shortness of breath; no pain  No new symptoms  He is feeling better overall  He still remains quite weak  Objective:  Vitals:  HR:  [] 106  Resp:  [18] 18  BP: (153-168)/(64-74) 153/74  SpO2:  [96 %-98 %] 96 %  Temp (24hrs), Av 1 °F (36 7 °C), Min:98 °F (36 7 °C), Max:98 3 °F (36 8 °C)  Current: Temperature: 98 3 °F (36 8 °C)    Physical Exam:   General Appearance:  Alert, nontoxic, no acute distress  Throat: Oropharynx moist without lesions  Lungs:   Clear to auscultation bilaterally; respirations unlabored   Heart:  RRR; no murmur, rub or gallop   Abdomen:   Soft, non-tender, non-distended, positive bowel sounds  Extremities: No clubbing, cyanosis or edema   Skin: No new rashes or lesions  No draining wounds noted         Labs, Imaging, & Other studies:   All pertinent labs and imaging studies were personally reviewed    Results from last 7 days  Lab Units 17  0458 12/15/17  0606 17  0555   WBC Thousand/uL 10 07 13 49* 13 12*   HEMOGLOBIN g/dL 10 7* 11 8* 11 1*   PLATELETS Thousands/uL 345 280 236       Results from last 7 days  Lab Units 17  0458 12/15/17  0559 17  0555   SODIUM mmol/L 138 138 137   POTASSIUM mmol/L 3 5 3 6 3 3*   CHLORIDE mmol/L 102 104 105   CO2 mmol/L 30 27 26   ANION GAP mmol/L 6 7 6   BUN mg/dL 10 9 9   CREATININE mg/dL 0 84 0 84 0 86   EGFR ml/min/1 73sq m 84 84 83   GLUCOSE RANDOM mg/dL 116 119 103   CALCIUM mg/dL 8 7 8 8 8 6   AST U/L 26 33 26   ALT U/L 56 66 66   ALK PHOS U/L 118* 124* 111   TOTAL PROTEIN g/dL 6 4 6 6 6 2*   ALBUMIN g/dL 2 2* 2 2* 2 1*   BILIRUBIN TOTAL mg/dL 0  43 0 46 0 67       Results from last 7 days  Lab Units 12/10/17  2030 12/10/17  1202 12/10/17  0917 12/09/17  2354   BLOOD CULTURE   --  No Growth After 5 Days  No Growth After 5 Days  No Growth After 5 Days  No Growth After 5 Days  GRAM STAIN RESULT  No Polys or Bacteria seen  --   --   --    BODY FLUID CULTURE, STERILE  No growth  --   --   --      The MRI liver-1   4 2 cm heterogeneously enhancing, lobulated lesion in segment VIII of the right hepatic lobe shows no significant change in size   The current study more conspicuously demonstrates areas of central enhancement that remain concerning for solid component   From an imaging standpoint, organizing abscess and metastatic lesion are still both differential considerations, though prior biopsy results are most suggestive of abscess  2   12 mm lesion in segment VII is most likely hemangioma though lack of discontinuity in early rim enhancement is atypical   Previous recommendation for 3 month follow-up is further supported  3   17 mm indeterminate left adrenal nodule is unchanged from prior

## 2017-12-16 NOTE — PROGRESS NOTES
[facility] Progress Note  Patient: Edward Goodman 65 y o  male   MRN: [id number]  PCP: No primary care provider on file  Unit/Bed#: MS Sanchez01 Encounter: [id number]  Date Of Visit: 12/16/17    Assessment:    Principal Problem:    Severe sepsis (HCC)  Active Problems:    Type 2 diabetes mellitus with hyperglycemia (HCC)    HTN (hypertension)    ALDEN (acute kidney injury) ([address] )    Liver mass    Cholelithiasis with biliary obstruction S/P Cholecystectomy 6 weeks ago    Anemia    Hypokalemia      Plan:    1   Severe sepsis, present on admission in [facility] [place], suspect secondary to early hepatic infection:  Infectious disease doctor on board   Continue with present intravenous antibiotic       2   Hypodense liver mass, previous biopsy reveals consistent with abscess with areas of acute inflammation, necrosis and hemorrhage   However, according to the report cannot totally rule out possibility of malignancy; patient had recent episode of cholecystitis and cholangitis with biliary obstruction status post ERCP with stenting; liver function tests had improved:  GI doctor on board  Case was discussed with them  Status post MRI of the abdomen done 12/15 and the mass was unchanged  According to GI, for removal of stents next week, likely Tuesday or Wednesday  Also, patient is for CT scan with tagged WBC on Monday  According to the notes of the Infectious Disease doctor, may need repeat a temp of aspiration/drainage and biopsy  This was discussed with the patient and patient's family as well as patient's outside doctor    For now, from my discussion with them, if patient really needs a repeat biopsy, this can be done in [place] where the patient lives       3   Acute kidney injury, resolved, likely due to patient's previous sepsis and hypotension:  Monitor BMP and input and output      4   Diabetes mellitus type 2 with hyperglycemia:  Continue insulin treatment   Will adjust this accordingly   Blood sugars are acceptable levels   Continue holding off metformin      5   Anemia, worsening today with patient reporting black stools; possible upper GI bleeding with melena:  Monitor patient's CBC/hemoglobin hematocrit   Continue holding off patient's blood thinners  I will have the patient sign a consent form for blood transfusion  Transfuse as needed  I spoke to the gastroenterologist about this and he is going to evaluate the patient today  I will send patient's stools for occult blood  I will have the patient on a PPI medication  In the meantime, will place the patient on a liquid diet  The anemia is presently asymptomatic      6   Hypokalemia, resolved:  Replace patient's potassium p r n        7   Poor appetite, improving; no more dysphagia at this point:  Continue nutritional supplements  Continue present diet      8  One episode of loose stools, resolved  9   Nausea presently resolved:  Continue with Zofran  VTE Pharmacologic Prophylaxis:   Pharmacologic: Pharmacologic VTE Prophylaxis contraindicated due to Possible GI bleeding with melena and drop in patient's hemoglobin today  Mechanical: Mechanical VTE prophylaxis in place  Discussions with Specialists or Other Care Team Provider:  Patient's nurse  Case management  Dr Gloria Barahona, gastroenterologist     Education and Discussions with Family / Patient:  Patient  I spoke at length to the patient's wife and patient's outside doctor and daughter via phone  I gave updates for today  Answered all questions and concerns  Time Spent for Care: 45 minutes  More than 50% of total time spent on counseling and coordination of care as described above  Current Length of Stay: 6 day(s)    Current Patient Status: Inpatient   Certification Statement: The patient will continue to require additional inpatient hospital stay due to Above findings and plans  Discharge Plan:  None yet      Code Status: Level 1 - Full Code      Subjective:   Presently, patient tells me that he feels better  However, patient still reports of black stools  Patient still has occasional pains at the right upper quadrant abdominal area  Patient does not have any nausea anymore and he told me that he ate better today  No shortness of breath  No chest pains  No dizziness or lightheadedness  No other complaints  Patient has been walking around the hallways without any problems  Objective:     Vitals:   Temp (24hrs), Av °F (36 7 °C), Min:97 8 °F (36 6 °C), Max:98 3 °F (36 8 °C)    HR:  [104-106] 105  Resp:  [18] 18  BP: (153-163)/(67-74) 163/72  SpO2:  [96 %-100 %] 100 %  Body mass index is 22 34 kg/m²  Input and Output Summary (last 24 hours): Intake/Output Summary (Last 24 hours) at 17 1604  Last data filed at 17 1125   Gross per 24 hour   Intake              880 ml   Output             2350 ml   Net            -1470 ml       Physical Exam:     Physical Exam   Constitutional: He is oriented to person, place, and time  No distress  HENT:   Head: Normocephalic and atraumatic  Eyes: Right eye exhibits no discharge  Left eye exhibits no discharge  No scleral icterus  Neck: No JVD present  No tracheal deviation present  Cardiovascular: Normal rate, regular rhythm and normal heart sounds  Exam reveals no gallop and no friction rub  No murmur heard  Pulmonary/Chest: Effort normal and breath sounds normal  No stridor  No respiratory distress  He has no wheezes  He has no rales  Abdominal: Soft  Bowel sounds are normal  He exhibits no distension  There is tenderness  There is no rebound and no guarding  Positive for direct right upper quadrant tenderness (but not as bad as before)  Musculoskeletal: He exhibits no edema, tenderness or deformity  Neurological: He is alert and oriented to person, place, and time  No cranial nerve deficit  Skin: Skin is warm and dry  No rash noted  He is not diaphoretic  No erythema  No pallor  Psychiatric: He has a normal mood and affect  His behavior is normal  Thought content normal        Additional Data:     Labs:      Results from last 7 days  Lab Units 12/16/17  0458  12/14/17  0555   WBC Thousand/uL 10 07  < > 13 12*   HEMOGLOBIN g/dL 10 7*  < > 11 1*   HEMATOCRIT % 32 5*  < > 32 6*   PLATELETS Thousands/uL 345  < > 236   NEUTROS PCT %  --   --  77*   LYMPHS PCT %  --   --  12*   MONOS PCT %  --   --  10   EOS PCT %  --   --  1   < > = values in this interval not displayed  Results from last 7 days  Lab Units 12/16/17  0458   SODIUM mmol/L 138   POTASSIUM mmol/L 3 5   CHLORIDE mmol/L 102   CO2 mmol/L 30   BUN mg/dL 10   CREATININE mg/dL 0 84   CALCIUM mg/dL 8 7   TOTAL PROTEIN g/dL 6 4   BILIRUBIN TOTAL mg/dL 0 43   ALK PHOS U/L 118*   ALT U/L 56   AST U/L 26   GLUCOSE RANDOM mg/dL 116       Results from last 7 days  Lab Units 12/09/17  2354   INR  1 34*       * I Have Reviewed All Lab Data Listed Above  * Additional Pertinent [facility] Tests [facility]ed: [facility]    Imaging:    Imaging Reports Reviewed Today Include:  Diagnostic imaging studies that were done on this admission  Imaging Personally Reviewed by Myself Includes:  None  Cultures:   Blood Culture:   Lab Results   Component Value Date    BLOODCX No Growth After 5 Days  12/10/2017    BLOODCX No Growth After 5 Days  12/10/2017    BLOODCX No Growth After 5 Days  12/09/2017    BLOODCX No Growth After 5 Days   12/09/2017     Urine Culture: No results found for: URINECX  Sputum Culture: No components found for: SPUTUMCX  Wound Culture: No results found for: WOUNDCULT    Last 24 Hours Medication List:     cefTRIAXone 2,000 mg Intravenous Q24H   insulin lispro 1-5 Units Subcutaneous TID AC   insulin lispro 1-5 Units Subcutaneous HS   levothyroxine 50 mcg Oral Early Morning   lisinopril 10 mg Oral Q12H SILVINO   ondansetron 4 mg Oral TID AC   saccharomyces boulardii 250 mg Oral BID tamsulosin 0 4 mg Oral Daily With Dinner        Today, Patient Was Seen By: Sylvia Sacks, MD    ** Please Note: Dragon 360 Dictation voice to text software may have been used in the creation of this document   **

## 2017-12-17 LAB
ANION GAP SERPL CALCULATED.3IONS-SCNC: 4 MMOL/L (ref 4–13)
BUN SERPL-MCNC: 10 MG/DL (ref 5–25)
CALCIUM SERPL-MCNC: 8.4 MG/DL (ref 8.3–10.1)
CHLORIDE SERPL-SCNC: 102 MMOL/L (ref 100–108)
CO2 SERPL-SCNC: 31 MMOL/L (ref 21–32)
CREAT SERPL-MCNC: 0.82 MG/DL (ref 0.6–1.3)
GFR SERPL CREATININE-BSD FRML MDRD: 85 ML/MIN/1.73SQ M
GLUCOSE SERPL-MCNC: 114 MG/DL (ref 65–140)
GLUCOSE SERPL-MCNC: 120 MG/DL (ref 65–140)
GLUCOSE SERPL-MCNC: 148 MG/DL (ref 65–140)
GLUCOSE SERPL-MCNC: 170 MG/DL (ref 65–140)
GLUCOSE SERPL-MCNC: 178 MG/DL (ref 65–140)
HCT VFR BLD AUTO: 31.7 % (ref 36.5–49.3)
HGB BLD-MCNC: 10.6 G/DL (ref 12–17)
POTASSIUM SERPL-SCNC: 3.8 MMOL/L (ref 3.5–5.3)
SODIUM SERPL-SCNC: 137 MMOL/L (ref 136–145)

## 2017-12-17 PROCEDURE — 80048 BASIC METABOLIC PNL TOTAL CA: CPT | Performed by: INTERNAL MEDICINE

## 2017-12-17 PROCEDURE — 82948 REAGENT STRIP/BLOOD GLUCOSE: CPT

## 2017-12-17 PROCEDURE — 85014 HEMATOCRIT: CPT | Performed by: INTERNAL MEDICINE

## 2017-12-17 PROCEDURE — C9113 INJ PANTOPRAZOLE SODIUM, VIA: HCPCS | Performed by: INTERNAL MEDICINE

## 2017-12-17 PROCEDURE — 85018 HEMOGLOBIN: CPT | Performed by: INTERNAL MEDICINE

## 2017-12-17 RX ADMIN — PANTOPRAZOLE SODIUM 40 MG: 40 INJECTION, POWDER, FOR SOLUTION INTRAVENOUS at 09:28

## 2017-12-17 RX ADMIN — INSULIN LISPRO 1 UNITS: 100 INJECTION, SOLUTION INTRAVENOUS; SUBCUTANEOUS at 22:18

## 2017-12-17 RX ADMIN — TAMSULOSIN HYDROCHLORIDE 0.4 MG: 0.4 CAPSULE ORAL at 16:04

## 2017-12-17 RX ADMIN — LISINOPRIL 10 MG: 10 TABLET ORAL at 22:14

## 2017-12-17 RX ADMIN — PANTOPRAZOLE SODIUM 40 MG: 40 INJECTION, POWDER, FOR SOLUTION INTRAVENOUS at 22:14

## 2017-12-17 RX ADMIN — INSULIN LISPRO 1 UNITS: 100 INJECTION, SOLUTION INTRAVENOUS; SUBCUTANEOUS at 11:10

## 2017-12-17 RX ADMIN — CEFTRIAXONE 2000 MG: 2 INJECTION, SOLUTION INTRAVENOUS at 15:16

## 2017-12-17 RX ADMIN — LISINOPRIL 10 MG: 10 TABLET ORAL at 09:28

## 2017-12-17 RX ADMIN — ONDANSETRON 4 MG: 4 TABLET, ORALLY DISINTEGRATING ORAL at 05:11

## 2017-12-17 RX ADMIN — Medication 250 MG: at 09:28

## 2017-12-17 RX ADMIN — Medication 250 MG: at 17:08

## 2017-12-17 RX ADMIN — ONDANSETRON 4 MG: 4 TABLET, ORALLY DISINTEGRATING ORAL at 16:04

## 2017-12-17 RX ADMIN — ONDANSETRON 4 MG: 4 TABLET, ORALLY DISINTEGRATING ORAL at 11:10

## 2017-12-17 RX ADMIN — LEVOTHYROXINE SODIUM 50 MCG: 50 TABLET ORAL at 05:09

## 2017-12-17 NOTE — PROGRESS NOTES
Progress Note - Infectious Disease   Eulogio Hunter 65 y o  male MRN: [id number]  Unit/Bed#: -01 Encounter: [id number]      Impression/Plan:  1   Severe sepsis-POA at [facility], leukocytosis, elevated lactate   Suspect secondary to early hepatic infection  No other clear sources appreciated  Thus far the patient's blood cultures have remained negative and he has become hemodynamically stable and nontoxic   He seems to be tolerating the antibiotics without difficulty   The lactate level normalized   The fever has resolved  He was not bacteremic   -continue ceftriaxone for now  -monitor CBC with diff and CMP  -supportive care  -additional workup as below     2   Hypodense liver mass-Biopsy appears consistent with abscess   Cannot exclude adjacent malignancy   With the patient having no liver mass back in late October, it is unlikely that this represents of malignancy, however perhaps there is more than 1 process  This more likely represents an inflammatory/infectious process like a phlegmon that has yet to become liquified into an abscess  No other clear source is appreciated   The liver biopsy cultures are negative  The repeat MRI is once again inconclusive   -antibiotics as above for now  -recommend interventional Radiology re-evaluation tomorrow to see if any drainable collection  -no additional ID workup for now  -tagged white cell scan as per GI although it is unlikely to change my management   -plan at least 2 weeks of antibiotics     3   Acute kidney injury-likely secondary to sepsis with hypotension   Pre renal issues may have significantly played a role   The renal function has improved and now stabilized  -monitor the GFR  -no need to dose adjust ceftriaxone for renal function  -no additional ID workup for now     4   Recent cholecystitis/cholangitis-with biliary obstruction   Status post ERCP with stenting   Possibly all related to 1   Transaminases are elevated however there does not appear to be an obstructive pattern, and imaging does not reveal any ongoing obstruction  The liver function tests have significantly improved  -monitor liver function test  -no additional ID workup for now  -GI follow-up  -possibly remove stent on Tuesday  -discussed with GI yesterday     5   Diabetes mellitus-type 2 with hyperglycemia    Antibiotics:  Ceftriaxone 6  Antibiotics 8    Subjective:  Patient has no fever, chills, sweats; no nausea, vomiting, diarrhea; no cough, shortness of breath; mild right upper quadrant pain  No new symptoms  He otherwise feels better  Objective:  Vitals:  HR:  [] 97  Resp:  [18] 18  BP: (157-177)/(72-81) 177/81  SpO2:  [93 %-100 %] 93 %  Temp (24hrs), Av °F (36 7 °C), Min:97 8 °F (36 6 °C), Max:98 1 °F (36 7 °C)  Current: Temperature: 98 1 °F (36 7 °C)    Physical Exam:   General Appearance:  Alert, nontoxic, no acute distress  Throat: Oropharynx moist without lesions  Lungs:   Clear to auscultation bilaterally; respirations unlabored   Heart:  RRR; no murmur, rub or gallop   Abdomen:   Soft, non-tender, non-distended, positive bowel sounds  Extremities: No clubbing, cyanosis or edema   Skin: No new rashes or lesions  No draining wounds noted         Labs, Imaging, & Other studies:   All pertinent labs and imaging studies were personally reviewed    Results from last 7 days  Lab Units 17  0551 17  1711 17  0458 12/15/17  0606 17  0555   WBC Thousand/uL  --   --  10 07 13 49* 13 12*   HEMOGLOBIN g/dL 10 6* 12 1 10 7* 11 8* 11 1*   PLATELETS Thousands/uL  --   --  345 280 236       Results from last 7 days  Lab Units 17  0514 17  0458 12/15/17  0559 17  0555   SODIUM mmol/L 137 138 138 137   POTASSIUM mmol/L 3 8 3 5 3 6 3 3*   CHLORIDE mmol/L 102 102 104 105   CO2 mmol/L 31 30 27 26   ANION GAP mmol/L 4 6 7 6   BUN mg/dL 10 10 9 9   CREATININE mg/dL 0 82 0 84 0 84 0 86   EGFR ml/min/1 73sq m 85 84 84 83   GLUCOSE RANDOM mg/dL 120 116 119 103   CALCIUM mg/dL 8 4 8 7 8 8 8 6   AST U/L  --  26 33 26   ALT U/L  --  56 66 66   ALK PHOS U/L  --  118* 124* 111   TOTAL PROTEIN g/dL  --  6 4 6 6 6 2*   ALBUMIN g/dL  --  2 2* 2 2* 2 1*   BILIRUBIN TOTAL mg/dL  --  0 43 0 46 0 67       Results from last 7 days  Lab Units 12/10/17  2030 12/10/17  1202   BLOOD CULTURE   --  No Growth After 5 Days     GRAM STAIN RESULT  No Polys or Bacteria seen  --    BODY FLUID CULTURE, STERILE  No growth  --

## 2017-12-17 NOTE — PROGRESS NOTES
[facility] Progress Note  Patient: Shaila Clement 65 y o  male   MRN: [id number]  PCP: No primary care provider on file  Unit/Bed#: -Alice Encounter: [id number]  Date Of Visit: 12/17/17    Assessment:    Principal Problem:    Severe sepsis ([facility] [address] )  Active Problems:    Type 2 diabetes mellitus with hyperglycemia (HCC)    HTN (hypertension)    ALDEN (acute kidney injury) ([facility] [address] )    Liver mass    Cholelithiasis with biliary obstruction S/P Cholecystectomy 6 weeks ago    Anemia    Hypokalemia      Plan:    1   Severe sepsis, present on admission in [facility] [place], suspect secondary to early hepatic infection:  Infectious disease doctor on board   Continue with present intravenous antibiotic       2   Hypodense liver mass, previous biopsy revealed consistent with abscess with areas of acute inflammation, necrosis and hemorrhage   However, according to the report cannot totally rule out possibility of malignancy; patient had recent episode of cholecystitis and cholangitis with biliary obstruction status post ERCP with stenting; liver function tests had improved:  GI doctor on board  Price Zelaya was discussed with them  Status post MRI of the abdomen done 12/15 and the mass was unchanged  According to GI, for removal of stents next week, likely Tuesday or Wednesday  But they have to clarify this 1st with his previous GI doctor who put the stents in  According to my discussion with Dr Anali Weaver  yesterday, there is no utility to do a CT scan with tagged WBC and he also told me that he verified this also with Infectious Disease doctor  Thus he discontinued the order for the CT scan with tagged WBC  According to the notes of the Infectious Disease doctor, his recommending IR re-evaluation tomorrow to see if any drainable collection and may need repeat  aspiration/drainage and biopsy  This was discussed with the patient and patient's family as well as patient's outside doctor yesterday    For now, from my discussion with them, if patient really needs a repeat biopsy, they want this done in [place] where the patient lives  I spoke to Dr Pete Eisenberg about this and his concern is that if there is still drainable fluid there, patient is high risk for relapse  Thus I will talk to them again tomorrow about this       3   Acute kidney injury, resolved, likely due to patient's previous sepsis and hypotension:  Monitor BMP and input and output      4   Diabetes mellitus type 2 with hyperglycemia:  Continue insulin treatment   Will adjust this accordingly   Blood sugars are acceptable levels   Continue holding off metformin      5   Anemia, stable; with patient reporting black stools this past few days but today, he told me that his black stools have been subsiding and it is now mixture of normal colored stools with some black stools; possible upper GI bleeding with melena:  Monitor patient's CBC/hemoglobin hematocrit   Continue holding off patient's blood thinners  Blood consent was done  Transfuse as needed  Still for stools for occult blood  Continue PPI medication  Case was discussed with Dr Shirlene Pope  From our discussion, okay to continue with regular diet  Likely EGD/ERCP this week  However, according to him, if there is significant drop in patient's hemoglobin overnight, he will squeeze him in for an EGD tomorrow  The anemia is presently asymptomatic      6   Hypokalemia, resolved:  Replace patient's potassium p r n        7   Poor appetite, improved; no more dysphagia at this point:  Continue nutritional supplements   Continue present diet      8   One episode of loose stools, resolved      9   Nausea presently resolved:  Continue with Zofran  VTE Pharmacologic Prophylaxis:   Pharmacologic: Pharmacologic VTE Prophylaxis contraindicated due to Possibility of upper GI bleeding  Mechanical: Mechanical VTE prophylaxis in place      Discussions with Specialists or Other Care Team Provider:  Patient's nurse   Infectious Disease doctor  Dr Harjinder Keita, GI doctor:  From a discussion with him, will monitor patient's CBC tomorrow morning  If there is significant drop, he will do the EGD tomorrow  Education and Discussions with Family / Patient:  Patient  I offered to call patient's family/wife, but patient declined  Time Spent for Care:  Approximately 35 minutes  More than 50% of total time spent on counseling and coordination of care as described above  Current Length of Stay: 7 day(s)    Current Patient Status: Inpatient   Certification Statement: The patient will continue to require additional inpatient hospital stay due to Above findings and plans  Discharge Plan:  None yet  Code Status: Level 1 - Full Code      Subjective:   Presently, patient tells me that his doing and feeling better  Patient told me that he does not have keith black stools anymore and that it is more of a mixture of normal stools and just few black stools  No vomiting episodes  No significant nausea at this point  In fact, patient was able to eat well last night and today  Patient had [email or web address] ([facility]) food last night  Patient does still has occasional right upper quadrant abdominal pains but not as bad as before  No diarrhea  No other complaints  No other pains  No shortness of breath  No chest pains  Objective:     Vitals:   Temp (24hrs), Av 2 °F (36 8 °C), Min:98 1 °F (36 7 °C), Max:98 4 °F (36 9 °C)    HR:  [90-97] 90  Resp:  [18] 18  BP: (149-177)/(71-81) 149/71  SpO2:  [93 %-100 %] 100 %  Body mass index is 22 2 kg/m²  Input and Output Summary (last 24 hours): Intake/Output Summary (Last 24 hours) at 17 1549  Last data filed at 17 1424   Gross per 24 hour   Intake             1300 ml   Output             2350 ml   Net            -1050 ml       Physical Exam:     Physical Exam   Constitutional: He is oriented to person, place, and time  No distress  HENT:   Head: Normocephalic and atraumatic  Eyes: Right eye exhibits no discharge  Left eye exhibits no discharge  No scleral icterus  Neck: No JVD present  No tracheal deviation present  Cardiovascular: Normal rate, regular rhythm and normal heart sounds  Exam reveals no gallop and no friction rub  No murmur heard  Pulmonary/Chest: Effort normal and breath sounds normal  No stridor  No respiratory distress  He has no wheezes  He has no rales  Abdominal: Soft  Bowel sounds are normal  He exhibits no distension  There is tenderness  There is no rebound and no guarding  Positive for mild tenderness at the right upper quadrant area  Musculoskeletal: He exhibits no edema, tenderness or deformity  Neurological: He is alert and oriented to person, place, and time  No cranial nerve deficit  Skin: Skin is warm  No rash noted  He is not diaphoretic  No erythema  No pallor  Psychiatric: He has a normal mood and affect  His behavior is normal  Thought content normal    Vitals reviewed  Additional Data:     Labs:      Results from last 7 days  Lab Units 12/17/17  0551  12/16/17  0458  12/14/17  0555   WBC Thousand/uL  --   --  10 07  < > 13 12*   HEMOGLOBIN g/dL 10 6*  < > 10 7*  < > 11 1*   HEMATOCRIT % 31 7*  < > 32 5*  < > 32 6*   PLATELETS Thousands/uL  --   --  345  < > 236   NEUTROS PCT %  --   --   --   --  77*   LYMPHS PCT %  --   --   --   --  12*   MONOS PCT %  --   --   --   --  10   EOS PCT %  --   --   --   --  1   < > = values in this interval not displayed  Results from last 7 days  Lab Units 12/17/17  0514 12/16/17  0458   SODIUM mmol/L 137 138   POTASSIUM mmol/L 3 8 3 5   CHLORIDE mmol/L 102 102   CO2 mmol/L 31 30   BUN mg/dL 10 10   CREATININE mg/dL 0 82 0 84   CALCIUM mg/dL 8 4 8 7   TOTAL PROTEIN g/dL  --  6 4   BILIRUBIN TOTAL mg/dL  --  0 43   ALK PHOS U/L  --  118*   ALT U/L  --  56   AST U/L  --  26   GLUCOSE RANDOM mg/dL 120 116           * I Have Reviewed All Lab Data Listed Above    * Additional Pertinent Lab Tests Reviewed: [facility] Admission Reviewed    Imaging:    Imaging Reports Reviewed Today Include:  Diagnostic imaging studies that were done on this admission  Imaging Personally Reviewed by Myself Includes:  None  Cultures:   Blood Culture:   Lab Results   Component Value Date    BLOODCX No Growth After 5 Days  12/10/2017    BLOODCX No Growth After 5 Days  12/10/2017    BLOODCX No Growth After 5 Days  12/09/2017    BLOODCX No Growth After 5 Days  12/09/2017     Urine Culture: No results found for: URINECX  Sputum Culture: No components found for: SPUTUMCX  Wound Culture: No results found for: WOUNDCULT    Last 24 Hours Medication List:     cefTRIAXone 2,000 mg Intravenous Q24H   insulin lispro 1-5 Units Subcutaneous TID AC   insulin lispro 1-5 Units Subcutaneous HS   levothyroxine 50 mcg Oral Early Morning   lisinopril 10 mg Oral Q12H SILVINO   ondansetron 4 mg Oral TID AC   pantoprazole 40 mg Intravenous Q12H [facility]   saccharomyces boulardii 250 mg Oral BID   tamsulosin 0 4 mg Oral Daily With Dinner        Today, Patient Was Seen By: Wei Horton MD    ** Please Note: Dragon 360 Dictation voice to text software may have been used in the creation of this document   **

## 2017-12-18 ENCOUNTER — ANESTHESIA EVENT (INPATIENT)
Dept: GASTROENTEROLOGY | Facility: HOSPITAL | Age: 77
DRG: 919 | End: 2017-12-18
Payer: COMMERCIAL

## 2017-12-18 PROBLEM — K76.9 HEPATIC LESION: Status: ACTIVE | Noted: 2017-12-10

## 2017-12-18 LAB
ERYTHROCYTE [DISTWIDTH] IN BLOOD BY AUTOMATED COUNT: 14.4 % (ref 11.6–15.1)
GLUCOSE SERPL-MCNC: 136 MG/DL (ref 65–140)
GLUCOSE SERPL-MCNC: 143 MG/DL (ref 65–140)
GLUCOSE SERPL-MCNC: 218 MG/DL (ref 65–140)
GLUCOSE SERPL-MCNC: 246 MG/DL (ref 65–140)
HCT VFR BLD AUTO: 31.4 % (ref 36.5–49.3)
HGB BLD-MCNC: 10.3 G/DL (ref 12–17)
MCH RBC QN AUTO: 26.8 PG (ref 26.8–34.3)
MCHC RBC AUTO-ENTMCNC: 32.8 G/DL (ref 31.4–37.4)
MCV RBC AUTO: 82 FL (ref 82–98)
PLATELET # BLD AUTO: 404 THOUSANDS/UL (ref 149–390)
PMV BLD AUTO: 10 FL (ref 8.9–12.7)
RBC # BLD AUTO: 3.85 MILLION/UL (ref 3.88–5.62)
WBC # BLD AUTO: 10.17 THOUSAND/UL (ref 4.31–10.16)

## 2017-12-18 PROCEDURE — 82948 REAGENT STRIP/BLOOD GLUCOSE: CPT

## 2017-12-18 PROCEDURE — C9113 INJ PANTOPRAZOLE SODIUM, VIA: HCPCS | Performed by: INTERNAL MEDICINE

## 2017-12-18 PROCEDURE — 85027 COMPLETE CBC AUTOMATED: CPT | Performed by: INTERNAL MEDICINE

## 2017-12-18 RX ORDER — PANTOPRAZOLE SODIUM 40 MG/1
40 TABLET, DELAYED RELEASE ORAL
Status: DISCONTINUED | OUTPATIENT
Start: 2017-12-18 | End: 2017-12-22 | Stop reason: HOSPADM

## 2017-12-18 RX ORDER — SODIUM CHLORIDE 9 MG/ML
75 INJECTION, SOLUTION INTRAVENOUS CONTINUOUS
Status: DISCONTINUED | OUTPATIENT
Start: 2017-12-18 | End: 2017-12-21

## 2017-12-18 RX ADMIN — SODIUM CHLORIDE 75 ML/HR: 0.9 INJECTION, SOLUTION INTRAVENOUS at 19:42

## 2017-12-18 RX ADMIN — CEFTRIAXONE 2000 MG: 2 INJECTION, SOLUTION INTRAVENOUS at 17:13

## 2017-12-18 RX ADMIN — PANTOPRAZOLE SODIUM 40 MG: 40 INJECTION, POWDER, FOR SOLUTION INTRAVENOUS at 10:43

## 2017-12-18 RX ADMIN — INSULIN LISPRO 1 UNITS: 100 INJECTION, SOLUTION INTRAVENOUS; SUBCUTANEOUS at 21:50

## 2017-12-18 RX ADMIN — Medication 250 MG: at 10:42

## 2017-12-18 RX ADMIN — LISINOPRIL 10 MG: 10 TABLET ORAL at 10:44

## 2017-12-18 RX ADMIN — ONDANSETRON 4 MG: 4 TABLET, ORALLY DISINTEGRATING ORAL at 06:23

## 2017-12-18 RX ADMIN — LISINOPRIL 10 MG: 10 TABLET ORAL at 21:51

## 2017-12-18 RX ADMIN — Medication 250 MG: at 17:11

## 2017-12-18 RX ADMIN — LEVOTHYROXINE SODIUM 50 MCG: 50 TABLET ORAL at 06:23

## 2017-12-18 RX ADMIN — TAMSULOSIN HYDROCHLORIDE 0.4 MG: 0.4 CAPSULE ORAL at 17:17

## 2017-12-18 RX ADMIN — INSULIN LISPRO 2 UNITS: 100 INJECTION, SOLUTION INTRAVENOUS; SUBCUTANEOUS at 12:36

## 2017-12-18 NOTE — CASE MANAGEMENT
Notification of Discharge  This is a Notification of Discharge from our facility [address]  Please be advised that this patient has been discharge from our facility  Below you will find the admission and discharge date and time including the patients disposition  PRESENTATION DATE: 12/9/2017 11:28 PM  IP ADMISSION DATE: 12/10/17 0145  DISCHARGE DATE: 12/10/2017  2:07 PM  DISPOSITION: [facility] in the [facility] for 2017  Network Utilization Review Department  Phone: [phone]; Fax [phone]  ATTENTION: The Network Utilization Review Department is now centralized for our 7 Facilities  Make a note that we have a new phone and fax numbers for our Department  Please call with any questions or concerns to [phone] and carefully follow the prompts so that you are directed to the right person  All voicemails are confidential  Fax any determinations, approvals, denials, and requests for initial or continue stay review clinical to [phone]  Due to HIGH CALL volume, it would be easier if you could please send faxed requests to expedite your requests and in part, help us provide discharge notifications faster

## 2017-12-18 NOTE — PROGRESS NOTES
Progress Note - Infectious Disease   Eulogio Hunter 65 y o  male MRN: [id number]  Unit/Bed#: -01 Encounter: [id number]      Impression/Plan:  1   Severe sepsis-POA at [facility], leukocytosis, elevated lactate   Suspect secondary to early hepatic infection  No other clear sources appreciated  Thus far the patient's blood cultures have remained negative and he has become hemodynamically stable and nontoxic   He seems to be tolerating the antibiotics without difficulty   The lactate level normalized   The fever has resolved   He was not bacteremic   -continue ceftriaxone for now  -monitor CBC with diff and CMP  -supportive care     2   Hypodense liver mass-Biopsy appears consistent with abscess   Cannot exclude adjacent malignancy   With the patient having no liver mass back in late October, it is unlikely that this represents of malignancy, however perhaps there is more than 1 process  This more likely represents an inflammatory/infectious process like a phlegmon that has yet to become liquified into an abscess  No other clear source is appreciated   The liver biopsy cultures are negative  The repeat MRI is once again inconclusive   -antibiotics as above for now  -recommend interventional Radiology re-evaluation tomorrow to see if any drainable collection  -no additional ID workup for now  -tagged white cell scan as per GI although it is unlikely to change my management   -plan at least 2 weeks of antibiotics  -if no drainable collection, no need to repeat attempted biopsy     3   Recent cholecystitis/cholangitis-with biliary obstruction   Status post ERCP with stenting   Possibly all related to 1  Transaminases are elevated however there does not appear to be an obstructive pattern, and imaging does not reveal any ongoing obstruction  The liver function tests have significantly improved    -monitor liver function test  -no additional ID workup for now  -GI follow-up  -possibly remove stent on Tuesday  -GI did speak with the patient's GI physician in [place]     4   Diabetes mellitus-type 2 with hyperglycemia    Antibiotics:  Ceftriaxone 7  Antibiotics 9    Subjective:  Patient has no fever, chills, sweats; no nausea, vomiting, diarrhea; no cough, shortness of breath; no pain  No new symptoms  He is feeling better overall  He is anxious to get out of the hospital     Objective:  Vitals:  HR:  [78-97] 78  Resp:  [18] 18  BP: (109-176)/(57-82) 109/57  SpO2:  [95 %-100 %] 95 %  Temp (24hrs), Av 6 °F (37 °C), Min:98 4 °F (36 9 °C), Max:98 9 °F (37 2 °C)  Current: Temperature: 98 5 °F (36 9 °C)    Physical Exam:   General Appearance:  Alert, nontoxic, no acute distress  Throat: Oropharynx moist without lesions  Lungs:   Clear to auscultation bilaterally; respirations unlabored   Heart:  RRR; no murmur, rub or gallop   Abdomen:   Soft, non-tender, non-distended, positive bowel sounds  Extremities: No clubbing, cyanosis or edema   Skin: No new rashes or lesions  No draining wounds noted         Labs, Imaging, & Other studies:   All pertinent labs and imaging studies were personally reviewed    Results from last 7 days  Lab Units 17  0635 17  0551 17  1711 17  0458 12/15/17  0606   WBC Thousand/uL 10 17*  --   --  10 07 13 49*   HEMOGLOBIN g/dL 10 3* 10 6* 12 1 10 7* 11 8*   PLATELETS Thousands/uL 404*  --   --  345 280       Results from last 7 days  Lab Units 17  0514 17  0458 12/15/17  0559 17  0555   SODIUM mmol/L 137 138 138 137   POTASSIUM mmol/L 3 8 3 5 3 6 3 3*   CHLORIDE mmol/L 102 102 104 105   CO2 mmol/L 31 30 27 26   ANION GAP mmol/L 4 6 7 6   BUN mg/dL 10 10 9 9   CREATININE mg/dL 0 82 0 84 0 84 0 86   EGFR ml/min/1 73sq m 85 84 84 83   GLUCOSE RANDOM mg/dL 120 116 119 103   CALCIUM mg/dL 8 4 8 7 8 8 8 6   AST U/L  --  26 33 26   ALT U/L  --  56 66 66   ALK PHOS U/L  --  118* 124* 111   TOTAL PROTEIN g/dL  --  6 4 6 6 6 2*   ALBUMIN g/dL  -- 2 2* 2 2* 2 1*   BILIRUBIN TOTAL mg/dL  --  0 43 0 46 0 67

## 2017-12-18 NOTE — PROGRESS NOTES
[facility] Progress Note  Patient: Magdaleno Harmon 65 y o  male   MRN: [id number]  PCP: No primary care provider on file  Unit/Bed#: [facility]-01 Encounter: [id number]  Date Of Visit: 12/18/17    Assessment:    Principal Problem:    Severe sepsis (HCC)  Active Problems:    Type 2 diabetes mellitus with hyperglycemia (HCC)    HTN (hypertension)    ALDEN (acute kidney injury) ([address] )    Liver mass    Cholelithiasis with biliary obstruction S/P Cholecystectomy 6 weeks ago    Anemia    Hypokalemia    Hepatic lesion      Plan:    1   Severe sepsis, present on admission in [facility] [place], suspect secondary to early hepatic infection:  Infectious disease doctor on board   Continue with present intravenous antibiotic       2   Hypodense liver mass, previous biopsy revealed consistent with abscess with areas of acute inflammation, necrosis and hemorrhage   However, according to the report cannot totally rule out possibility of malignancy; patient had recent episode of cholecystitis and cholangitis with biliary obstruction status post ERCP with stenting; liver function tests had improved: [address] doctor on board  Steve Russo was discussed with them   Status post MRI of the abdomen done 12/15 and the mass was unchanged  According to GI, for EGD with removal of stents tomorrow  According to the notes of the Infectious Disease doctor, he is recommending IR re-evaluation tomorrow to see if any drainable collection and may need repeat  aspiration/drainage and biopsy  If no drainable collection, no need to repeat attempted biopsy  This was discussed with the patient and patient's family as well as patient's outside doctor yesterday   For now, from my discussion with them, if patient really needs a repeat biopsy, they want this done in [place] where the patient lives  I spoke to Dr Binta Stanton about this and his concern is that if there is still drainable fluid there, patient is high risk for relapse    Thus, we need to talk to them, which should include the outside doctor from [place] in the conversation  Today during my rounds, the family called her, but she did not call back to join the conversation       3   Acute kidney injury, resolved, likely due to patient's previous sepsis and hypotension:  Monitor BMP and input and output      4   Diabetes mellitus type 2 with hyperglycemia:  Continue insulin treatment   Will adjust this accordingly   Blood sugars are acceptable levels   Continue holding off metformin      5   Anemia, stable; with patient reporting black stools this past few days but today, he told me that his black stools had subsided; possible upper GI bleeding with melena:  Monitor patient's CBC/hemoglobin hematocrit  Continue holding off patient's blood thinners  Blood consent was done  Transfuse as needed  Still for stools for occult blood  Continue PPI medication  Case was discussed with Dr Elham Hernandez  From our discussion, okay to continue with regular diet  For EGD tomorrow  The anemia is presently asymptomatic      6   Hypokalemia, resolved:  Replace patient's potassium p r n        7   Poor appetite, improved; no more dysphagia at this point:  Continue nutritional supplements   Continue present diet      8   One episode of loose stools, resolved      9   Nausea presently resolved:  Continue with Zofran  VTE Pharmacologic Prophylaxis:   Pharmacologic: Pharmacologic VTE Prophylaxis contraindicated due to Possible upper GI bleeding  Mechanical: Mechanical VTE prophylaxis in place  Discussions with Specialists or Other Care Team Provider:  Patient's nurse  Case management  Advance practitioner for GI  Education and Discussions with Family / Patient:  Patient  Patient's wife at bedside and patient's daughter on the phone  The wife tried calling their doctor from [place] to join the conversation but she did not answer  I gave updates for today  I answered all questions and concerns      Time Spent for Care: 30 minutes  More than 50% of total time spent on counseling and coordination of care as described above  Current Length of Stay: 8 day(s)    Current Patient Status: Inpatient   Certification Statement: The patient will continue to require additional inpatient hospital stay due to Above findings and plans  Discharge Plan:  None yet today  Code Status: Level 1 - Full Code      Subjective:   Presently, patient is doing fine  Patient still has occasional pains at the right upper quadrant abdominal area most specially when he gets up, however, not as bad as before  Patient denies any more black stools  He told me that his stools are formed and brown in color  No significant nausea at this point  No vomiting  No other complaints  No shortness of breath  No chest pains  No dizziness  Patient has been walking around without any problems  Objective:     Vitals:   Temp (24hrs), Av 4 °F (36 9 °C), Min:97 8 °F (36 6 °C), Max:98 9 °F (37 2 °C)    HR:  [78-97] 94  Resp:  [18] 18  BP: (109-176)/(57-82) 157/74  SpO2:  [95 %-97 %] 97 %  Body mass index is 22 17 kg/m²  Input and Output Summary (last 24 hours): Intake/Output Summary (Last 24 hours) at 17 1801  Last data filed at 17 1745   Gross per 24 hour   Intake             1585 ml   Output             1700 ml   Net             -115 ml       Physical Exam:     Physical Exam   Constitutional: He is oriented to person, place, and time  No distress  HENT:   Head: Normocephalic and atraumatic  Eyes: Right eye exhibits no discharge  Left eye exhibits no discharge  No scleral icterus  Neck: No JVD present  No tracheal deviation present  Cardiovascular: Normal rate, regular rhythm and normal heart sounds  Exam reveals no gallop and no friction rub  No murmur heard  Pulmonary/Chest: Effort normal and breath sounds normal  No stridor  No respiratory distress  He has no wheezes  He has no rales     Abdominal: Soft  Bowel sounds are normal  He exhibits no distension  There is no tenderness  There is no rebound and no guarding  Musculoskeletal: Normal range of motion  He exhibits no edema, tenderness or deformity  Neurological: He is alert and oriented to person, place, and time  No cranial nerve deficit  Skin: Skin is warm  No rash noted  He is not diaphoretic  No erythema  No pallor  Psychiatric: He has a normal mood and affect  His behavior is normal  Thought content normal    Patient is pleasant  Vitals reviewed  Additional Data:     Labs:      Results from last 7 days  Lab Units 12/18/17  0635  12/14/17  0555   WBC Thousand/uL 10 17*  < > 13 12*   HEMOGLOBIN g/dL 10 3*  < > 11 1*   HEMATOCRIT % 31 4*  < > 32 6*   PLATELETS Thousands/uL 404*  < > 236   NEUTROS PCT %  --   --  77*   LYMPHS PCT %  --   --  12*   MONOS PCT %  --   --  10   EOS PCT %  --   --  1   < > = values in this interval not displayed  Results from last 7 days  Lab Units 12/17/17  0514 12/16/17  0458   SODIUM mmol/L 137 138   POTASSIUM mmol/L 3 8 3 5   CHLORIDE mmol/L 102 102   CO2 mmol/L 31 30   BUN mg/dL 10 10   CREATININE mg/dL 0 82 0 84   CALCIUM mg/dL 8 4 8 7   TOTAL PROTEIN g/dL  --  6 4   BILIRUBIN TOTAL mg/dL  --  0 43   ALK PHOS U/L  --  118*   ALT U/L  --  56   AST U/L  --  26   GLUCOSE RANDOM mg/dL 120 116           * I Have Reviewed All Lab Data Listed Above  * Additional Pertinent Lab Tests Reviewed: [facility] Admission Reviewed    Imaging:    Imaging Reports Reviewed Today Include:  Diagnostic imaging studies that were done this admission  Imaging Personally Reviewed by Myself Includes:  None  Cultures:   Blood Culture:   Lab Results   Component Value Date    BLOODCX No Growth After 5 Days  12/10/2017    BLOODCX No Growth After 5 Days  12/10/2017    BLOODCX No Growth After 5 Days  12/09/2017    BLOODCX No Growth After 5 Days   12/09/2017     Urine Culture: No results found for: URINECX  Sputum Culture: No components found for: SPUTUMCX  Wound Culture: No results found for: WOUNDCULT    Last 24 Hours Medication List:     cefTRIAXone 2,000 mg Intravenous Q24H   insulin lispro 1-5 Units Subcutaneous TID AC   insulin lispro 1-5 Units Subcutaneous HS   levothyroxine 50 mcg Oral Early Morning   lisinopril 10 mg Oral Q12H SILVINO   ondansetron 4 mg Oral TID AC   pantoprazole 40 mg Oral BID AC   saccharomyces boulardii 250 mg Oral BID   tamsulosin 0 4 mg Oral Daily With Dinner        Today, Patient Was Seen By: Cher Landa MD    ** Please Note: Dragon 360 Dictation voice to text software may have been used in the creation of this document   **

## 2017-12-18 NOTE — ANESTHESIA PREPROCEDURE EVALUATION
Review of Systems/Medical History  Patient summary reviewed  Chart reviewed  No history of anesthetic complications     Cardiovascular  Hypertension ,    Pulmonary  Smoker ex-smoker , ,        GI/Hepatic      Comment: Severe sepsis/Cholelithiasis with biliary obstruction/Hepatic lesion  Hepatic lesion--Bx c/w abscess but malignancy cannot be r/o  Pt with recent hx cholecystitis and cholangitis--s/pERCP with stents--for stent removal Tues/Wed this week  Infec disease MD rec IR for re-biopsy/drainage/aspiration mass     Kidney disease ARF, Prostatic disorder, benign prostatic hyperplasia       Endo/Other  Diabetes type 2 Oral agent, History of thyroid disease , hypothyroidism,      GYN  Negative gynecology ROS          Hematology  Anemia ,     Musculoskeletal  Negative musculoskeletal ROS        Neurology  Negative neurology ROS      Psychology   Negative psychology ROS            Physical Exam    Airway  Comment: Small mouth opening  Mallampati score: II  TM Distance: >3 FB  Neck ROM: full     Dental   Comment: Dental implants lower,     Cardiovascular  Rhythm: regular, Rate: normal,     Pulmonary  Breath sounds clear to auscultation,     Other Findings        Anesthesia Plan  ASA Score- 3       Anesthesia Type- general with ASA Monitors  Additional Monitors:   Airway Plan: ETT  Induction- intravenous  Informed Consent- Anesthetic plan and risks discussed with patient  I personally reviewed this patient with the CRNA  Discussed and agreed on the Anesthesia Plan with the CRNA  Vik Nava

## 2017-12-19 ENCOUNTER — ANESTHESIA (INPATIENT)
Dept: GASTROENTEROLOGY | Facility: HOSPITAL | Age: 77
DRG: 919 | End: 2017-12-19
Payer: COMMERCIAL

## 2017-12-19 ENCOUNTER — APPOINTMENT (INPATIENT)
Dept: RADIOLOGY | Facility: HOSPITAL | Age: 77
DRG: 919 | End: 2017-12-19
Attending: INTERNAL MEDICINE
Payer: COMMERCIAL

## 2017-12-19 PROBLEM — N17.9 AKI (ACUTE KIDNEY INJURY) (HCC): Status: RESOLVED | Noted: 2017-12-10 | Resolved: 2017-12-19

## 2017-12-19 PROBLEM — E87.6 HYPOKALEMIA: Status: RESOLVED | Noted: 2017-12-13 | Resolved: 2017-12-19

## 2017-12-19 LAB
ERYTHROCYTE [DISTWIDTH] IN BLOOD BY AUTOMATED COUNT: 14.5 % (ref 11.6–15.1)
GLUCOSE SERPL-MCNC: 113 MG/DL (ref 65–140)
GLUCOSE SERPL-MCNC: 116 MG/DL (ref 65–140)
GLUCOSE SERPL-MCNC: 145 MG/DL (ref 65–140)
GLUCOSE SERPL-MCNC: 229 MG/DL (ref 65–140)
HCT VFR BLD AUTO: 32.2 % (ref 36.5–49.3)
HEMOCCULT STL QL: NEGATIVE
HGB BLD-MCNC: 10.6 G/DL (ref 12–17)
MCH RBC QN AUTO: 27 PG (ref 26.8–34.3)
MCHC RBC AUTO-ENTMCNC: 32.9 G/DL (ref 31.4–37.4)
MCV RBC AUTO: 82 FL (ref 82–98)
PLATELET # BLD AUTO: 411 THOUSANDS/UL (ref 149–390)
PMV BLD AUTO: 9.7 FL (ref 8.9–12.7)
RBC # BLD AUTO: 3.92 MILLION/UL (ref 3.88–5.62)
WBC # BLD AUTO: 12.37 THOUSAND/UL (ref 4.31–10.16)

## 2017-12-19 PROCEDURE — C1769 GUIDE WIRE: HCPCS | Performed by: INTERNAL MEDICINE

## 2017-12-19 PROCEDURE — 88305 TISSUE EXAM BY PATHOLOGIST: CPT | Performed by: INTERNAL MEDICINE

## 2017-12-19 PROCEDURE — 82272 OCCULT BLD FECES 1-3 TESTS: CPT | Performed by: INTERNAL MEDICINE

## 2017-12-19 PROCEDURE — 88342 IMHCHEM/IMCYTCHM 1ST ANTB: CPT | Performed by: INTERNAL MEDICINE

## 2017-12-19 PROCEDURE — BF101ZZ FLUOROSCOPY OF BILE DUCTS USING LOW OSMOLAR CONTRAST: ICD-10-PCS | Performed by: INTERNAL MEDICINE

## 2017-12-19 PROCEDURE — 74328 X-RAY BILE DUCT ENDOSCOPY: CPT

## 2017-12-19 PROCEDURE — 82948 REAGENT STRIP/BLOOD GLUCOSE: CPT

## 2017-12-19 PROCEDURE — 0FPB8DZ REMOVAL OF INTRALUMINAL DEVICE FROM HEPATOBILIARY DUCT, VIA NATURAL OR ARTIFICIAL OPENING ENDOSCOPIC: ICD-10-PCS | Performed by: INTERNAL MEDICINE

## 2017-12-19 PROCEDURE — 85027 COMPLETE CBC AUTOMATED: CPT | Performed by: INTERNAL MEDICINE

## 2017-12-19 RX ORDER — PROPOFOL 10 MG/ML
INJECTION, EMULSION INTRAVENOUS CONTINUOUS PRN
Status: DISCONTINUED | OUTPATIENT
Start: 2017-12-19 | End: 2017-12-19 | Stop reason: SURG

## 2017-12-19 RX ORDER — PROPOFOL 10 MG/ML
INJECTION, EMULSION INTRAVENOUS AS NEEDED
Status: DISCONTINUED | OUTPATIENT
Start: 2017-12-19 | End: 2017-12-19 | Stop reason: SURG

## 2017-12-19 RX ADMIN — LISINOPRIL 10 MG: 10 TABLET ORAL at 21:04

## 2017-12-19 RX ADMIN — INSULIN LISPRO 2 UNITS: 100 INJECTION, SOLUTION INTRAVENOUS; SUBCUTANEOUS at 21:05

## 2017-12-19 RX ADMIN — IOHEXOL 9 ML: 240 INJECTION, SOLUTION INTRATHECAL; INTRAVASCULAR; INTRAVENOUS; ORAL at 15:00

## 2017-12-19 RX ADMIN — CEFTRIAXONE 2000 MG: 2 INJECTION, SOLUTION INTRAVENOUS at 17:15

## 2017-12-19 RX ADMIN — TAMSULOSIN HYDROCHLORIDE 0.4 MG: 0.4 CAPSULE ORAL at 17:22

## 2017-12-19 RX ADMIN — PANTOPRAZOLE SODIUM 40 MG: 40 TABLET, DELAYED RELEASE ORAL at 06:25

## 2017-12-19 RX ADMIN — PROPOFOL 150 MCG/KG/MIN: 10 INJECTION, EMULSION INTRAVENOUS at 14:48

## 2017-12-19 RX ADMIN — LEVOTHYROXINE SODIUM 50 MCG: 50 TABLET ORAL at 06:25

## 2017-12-19 RX ADMIN — Medication 250 MG: at 09:18

## 2017-12-19 RX ADMIN — SODIUM CHLORIDE 75 ML/HR: 0.9 INJECTION, SOLUTION INTRAVENOUS at 09:16

## 2017-12-19 RX ADMIN — LISINOPRIL 10 MG: 10 TABLET ORAL at 09:16

## 2017-12-19 RX ADMIN — PANTOPRAZOLE SODIUM 40 MG: 40 TABLET, DELAYED RELEASE ORAL at 17:22

## 2017-12-19 RX ADMIN — Medication 250 MG: at 21:04

## 2017-12-19 RX ADMIN — PROPOFOL 100 MG: 10 INJECTION, EMULSION INTRAVENOUS at 14:47

## 2017-12-19 NOTE — PROGRESS NOTES
Progress Note - Sierra Ruffin 1940, 65 y o  male MRN: [id number]    Unit/Bed#: ENDO POOL Encounter: [id number]    Primary Care Provider: No primary care provider on file  Date and time admitted to hospital: 12/10/2017  2:29 PM    * Severe sepsis [facility])   Assessment & Plan    · POA at [facility] [place]  · Suspected secondary to hepatic infection  · Sepsis is resolved and patient continues on IV ceftriaxone for hepatic infection      Liver mass   Assessment & Plan    · Hypotensive liver mass status post biopsy which appeared consistent with abscess  Malignancy however could not be excluded although less likely  · Repeat MRI on 12/15/17 was inconclusive although most suggestive of abscess  · For IR Re evaluation today to see if any drainable collection  · Holding off on repeat biopsy at this time as patient and family will prefer this to be done in [place] where he resides  · He will be continued on IV ceftriaxone for now with planned 2 week antibiotic course per ID      Cholelithiasis with biliary obstruction S/P Cholecystectomy 6 weeks ago   Assessment & Plan    · For ERCP with removal of stent scheduled today  · Liver enzymes/bilirubin within normal limits        Type 2 diabetes mellitus with hyperglycemia (HCC)   Assessment & Plan    · Blood glucose acceptable  · Was on metformin 500 mg daily prior to admission  Will resume on discharge  · Continue SSI       Anemia   Assessment & Plan    · HGB stable at 10  · Fecal occult blood negative x1      HTN (hypertension)   Assessment & Plan    · Continue lisinopril        VTE Pharmacologic Prophylaxis:   Pharmacologic: Enoxaparin (Lovenox)  Mechanical VTE Prophylaxis in Place: Yes    Patient Centered Rounds: I have performed bedside rounds with nursing staff today      Discussions with Specialists or Other Care Team Provider: Nursing    Education and Discussions with Family / Patient: Patient    Current Length of Stay: 9 day(s)    Current Patient Status: Inpatient   Certification Statement: The patient will continue to require additional inpatient hospital stay due to Liver abscess, sepsis    Discharge Plan:  Home when cleared from the ID/GI standpoints    Code Status: Level 1 - Full Code      Subjective:   Patient seen and evaluated  He denies any new complaints  No fever or chills, no abdominal pain, no nausea or vomiting  Objective:     Vitals:   Temp (24hrs), Av 6 °F (37 °C), Min:98 1 °F (36 7 °C), Max:99 2 °F (37 3 °C)    HR:  [95-96] 96  Resp:  [16-18] 16  BP: (160-175)/(71-79) 171/75  SpO2:  [97 %-99 %] 98 %  Body mass index is 22 08 kg/m²  Input and Output Summary (last 24 hours): Intake/Output Summary (Last 24 hours) at 17 1511  Last data filed at 17 1205   Gross per 24 hour   Intake              225 ml   Output             2200 ml   Net            -1975 ml       Physical Exam:  General Appearance:    Alert, cooperative, no distress, appropriately responsive    Head:    Normocephalic, without obvious abnormality, atraumatic, mucous membranes moist    Eyes:    Conjunctiva/corneas clear, EOM's intact   Neck:   Supple   Lungs:     Clear to auscultation bilaterally, respirations unlabored, no crackles or wheeze     Heart:    Regular rate and rhythm, S1 and S2    Abdomen:     Soft, non-tender, bowel sounds active all four quadrants,     no masses, no organomegaly   Extremities:   Extremities normal, atraumatic, no cyanosis or edema   Neurologic:  nonfocal         Additional Data:     Labs:      Results from last 7 days  Lab Units 17  0607  17  0555   WBC Thousand/uL 12 37*  < > 13 12*   HEMOGLOBIN g/dL 10 6*  < > 11 1*   HEMATOCRIT % 32 2*  < > 32 6*   PLATELETS Thousands/uL 411*  < > 236   NEUTROS PCT %  --   --  77*   LYMPHS PCT %  --   --  12*   MONOS PCT %  --   --  10   EOS PCT %  --   --  1   < > = values in this interval not displayed      Results from last 7 days  Lab Units 17  0514 12/16/17  0458   SODIUM mmol/L 137 138   POTASSIUM mmol/L 3 8 3 5   CHLORIDE mmol/L 102 102   CO2 mmol/L 31 30   BUN mg/dL 10 10   CREATININE mg/dL 0 82 0 84   CALCIUM mg/dL 8 4 8 7   TOTAL PROTEIN g/dL  --  6 4   BILIRUBIN TOTAL mg/dL  --  0 43   ALK PHOS U/L  --  118*   ALT U/L  --  56   AST U/L  --  26   GLUCOSE RANDOM mg/dL 120 116           * I Have Reviewed All Lab Data Listed Above  * Additional Pertinent Lab Tests Reviewed: Hattie 65 Admission Reviewed    Cultures:   Blood Culture:   Lab Results   Component Value Date    BLOODCX No Growth After 5 Days  12/10/2017    BLOODCX No Growth After 5 Days  12/10/2017    BLOODCX No Growth After 5 Days  12/09/2017    BLOODCX No Growth After 5 Days  12/09/2017     Urine Culture: No results found for: URINECX  Sputum Culture: No components found for: SPUTUMCX  Wound Culture: No results found for: WOUNDCULT    Last 24 Hours Medication List:     cefTRIAXone 2,000 mg Intravenous Q24H   insulin lispro 1-5 Units Subcutaneous TID AC   insulin lispro 1-5 Units Subcutaneous HS   levothyroxine 50 mcg Oral Early Morning   lisinopril 10 mg Oral Q12H SILVINO   ondansetron 4 mg Oral TID AC   pantoprazole 40 mg Oral BID AC   saccharomyces boulardii 250 mg Oral BID   tamsulosin 0 4 mg Oral Daily With Dinner        Today, Patient Was Seen By: Laxmi Rodriguez MD    ** Please Note: Dragon 360 Dictation voice to text software may have been used in the creation of this document   **

## 2017-12-19 NOTE — ASSESSMENT & PLAN NOTE
· POA at [facility] AT [place]  · Suspected secondary to hepatic infection  · Sepsis is resolved and patient continues on IV ceftriaxone for hepatic infection

## 2017-12-19 NOTE — OP NOTE
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  S/P   ERCP with stent placement at OSH  Admitted with liver abcess vs mass  ERCP   for stent removal and cholangiogram  Intermittent melena and anemia  CONSENT:  The benefits, risks, and alternatives to the procedure were   discussed and informed consent was obtained from the patient  PREPARATION:  EKG, pulse, pulse oximetry and blood pressure were monitored   throughout the procedure  ASA Classification: Class 2 - Patient has mild   to moderate systemic disturbance that may or may not be related to the   disorder requiring surgery  Airway Assessment Classification: Airway class   2 - Visualization of the soft palate, fauces and uvula  The patient was   kept NPO  MEDICATIONS: Anesthesia-check records     PROCEDURE:  The endoscope was passed with ease through the mouth under   direct visualization and advanced to the duodenum  The scope was withdrawn   and the mucosa was carefully examined  FINDINGS:  Plastic biliary stent was removed using a polypectomy snare  Then, selective biliary cannulation was achieved using a sphincterotome   and guidewire  Cholangiogram showed non dilated CBD and intrahepatic   ducts  CBD measured 6 mm  No stricture seen  The duct was swept using an   inflated stone extarction balloon at 9 mm  No stone is removed  Duodenoscope was removed then gastroscope was introduced  A 5 mm clean   base ulcer seen in the duodenum  Mild antral gastritis otherwise normal   stomach and esophagus  COMPLICATIONS: There were no complications       IMPRESSIONS: ERCP with stent removal  Non dialted CBD and intrahepatic   ducts  No filling defect seen  Small clean base duodenal ulcer  Mild   gastritis  RECOMMENDATIONS: -Protonix 40 mg po daily  -Resume regular diet  -Monitor   Hb  -Follow up CT scan in 4 weeks   -Antibiotics as per ID for liver   abcess  ESTIMATED BLOOD LOSS:     PROCEDURE CODES:     ICD-9 Codes:     ICD-10 Codes:     PERFORMED BY: ZORA Morales  on 12/19/2017  Version 1, electronically signed by ZORA Retana  on 12/19/2017   at 15:28

## 2017-12-19 NOTE — CASE MANAGEMENT
Reference # [id number]   Mat Sanders RN Registered Nurse Signed   Case Management Date of Service: 12/11/2017 10:46 AM         []Hide copied text  [facility] in the [employer] by Arnold Ulrich for 2017  Network Utilization Review Department  Phone: [phone]; Fax [phone]  ATTENTION: The Network Utilization Review Department is now centralized for our 7 Facilities  Make a note that we have a new phone and fax numbers for our Department  Please call with any questions or concerns to [phone] and carefully follow the prompts so that you are directed to the right person  All voicemails are confidential  Fax any determinations, approvals, denials, and requests for initial or continue stay review clinical to [phone]  Due to HIGH CALL volume, it would be easier if you could please send faxed requests to expedite your requests and in part, help us provide discharge notifications faster      Initial Clinical Review     Admission: Date/Time/Statement: 12/10/17 @ 1429            Orders Placed This Encounter   Procedures    Inpatient Admission       Standing Status:   Standing       Number of Occurrences:   1       Order Specific Question:   Admitting Physician       Answer:   Alivia Lozano [[id number]]       Order Specific Question:   Level of Care       Answer:   Critical Care [15]       Order Specific Question:   Estimated length of stay       Answer:   More than 2 Midnights       Order Specific Question:   Certification       Answer:   I certify that inpatient services are medically necessary for this patient for a duration of greater than two midnights   See H&P and MD Progress Notes for additional information about the patient's course of treatment       ED: Date/Time/Mode of Arrival:       ED Arrival Information          Patient not seen in ED  TRANSFER FROM [facility] TO [facility]                            History of Illness: Eulogio Hunter is a 65 y o  male w/ hx of recent cholecystectomy w/ biliary stent placement 10/25/17 presents with fevers/chills/rigors that bega two days ago  Patient states he started to feel unwell, and would periodically shake from being febrile  Tried tylenol with no alleviation of symptoms  Patient thought maybe he got sick on the plane ride, as patient is visiting from [place] for a family wedding  Patient this morning felt particularly worse, and was brought to [facility], where patient was found to be septic, with tachycardia, soft pressures, and febrile  IR imaginag found a hepaic mass in right liver lobe, and an abscess could not be ruled out  Transferred to [facility] for further management and IR drainage      On arrival, patient states he feels fine, denies any current nausea/vomiting, mild abdominal pain  Patient states he is to have his stents removed 12/20/17  When asked about why patient had cholecystectomy, patient states he had biliary sludge as well as many gallstones      Patient had stent placed at [facility] in [place]  The patient was unable to recall the GI surgeon's name  His gastroenterologist was Dr Bartolome Urrutia [phone]  Attempted to call the GI physician for further information, and left message for Steven Girard, Dr Jasmin Diza advanced practitioner, but no one was able to be reached for further information   Also called [facility], [phone], but  was unable to transfer us to the surgeon who performed the patient's surgery, since medical records is closed on a Sunday       ED Vital Signs:   ED Triage Vitals   Temperature Pulse Respirations Blood Pressure SpO2   12/10/17 1441 12/10/17 1441 12/10/17 1441 12/10/17 1441 12/10/17 1441   100 4 °F (38 °C) (!) 112 (!) 24 92/51 96 %       Temp Source Heart Rate Source Patient Position - Orthostatic VS BP Location FiO2 (%)   12/10/17 1441 12/10/17 1441 12/10/17 2005 12/10/17 1441 --   Oral Monitor Lying Right arm         Pain Score           12/10/17 1441           No Pain                Wt Readings from Last 1 Encounters:   12/10/17 65 4 kg (144 lb 2 9 oz)      Vital Signs (abnormal):   12/11/17 1000   --    108    28   115/60   89   100 %   --   --   12/11/17 0900   98 4 °F (36 9 °C)   102    23   136/69   87   100 %   None (Room air)   Sitting   12/11/17 0800   --   94    26   123/59   87   97 %   --   --   12/11/17 0400   98 2 °F (36 8 °C)    108    25   134/55   93   91 %   None (Room air)   Lying   12/10/17 1959   --   93   15    88/52   67   99 %   Nasal cannula   --   12/10/17 1900   --   88    26   114/63   76   99 %   --   --   12/10/17 1800   --   90    26    96/44   69   98 %   --   --   12/10/17 1700   98 8 °F (37 1 °C)   96    34   103/59   78   98 %   --   --   12/10/17 1600   --   98   17    91/44   62   96 %   --   --   12/10/17 1500   --    106    27   92/51   69   95 %   --   --   12/10/17 1441   100 4 °F (38 °C)    112    24   92/51   --   96 %   None (Room air)   --      Abnormal Labs:    12/9 12/10 12/11 12/12   Sodium 134 134     139     139       Chloride 99 101 109 110   CO2 24 25 22 20   BUN 26 23 26 27   Creatinine 1 45 1 29 1 27 1 11   Glucose 157 168 113 95   Calcium 8 3 7 8 6 8 7 0   AST 64     140   ALT 73     155   Total Protein 6 6     5 4   Albumin 3 0     2 1   Total Bilirubin 1 20     0 97   eGFR 46 53 54 63   Magnesium 1 5            WBC 22 62     20 93     21 27     19 68       RBC 4 12 3 74 3 68 3 70   Hemoglobin 11 0 10 1 10 0 10 0   Hematocrit 34 0 31 0 30 3 29 7   MCV 83 83 82 80   MCH 26 7 27 0 27 2 27 0   Platelets 435 933 859 142   PT/INR 17 0/1 34  PTT 41  POC glucose 146  D Dimer 1440  PT/INR   Trop WNL  Lactic acide 2 3, 4 4  12/10   Protein, UA Trace     Blood, UA Small        Diagnostic Test Results:      CT abd/pelvis - 1   Enhancing hepatic lesions as described above   Differential would include atypical hemangioma, hepatic neoplasm, either primary or metastatic or hepatic abscess, given the history of fever and recent gallbladder surgery   It is recommended the patient have a follow-up MRI of the liver with gadolinium, for further evaluation  2   Prostatomegaly with probable bladder outlet obstruction   Correlation with serum PSA  3   Colonic diverticulosis      MRI abd - 4 5 cm right hepatic lobe mass is indeterminate and restricts diffusion   Although abscess remains in the differential diagnosis, its imaging appearance slightly favor a solid mass   Needle sampling is necessary for further differentiation and therefore recommended  1 7 cm posterior right hepatic lobe lesion is difficult to characterize given its small size although has imaging features favoring cavernous hemangioma   I would recommend follow-up MRI in 3 months to ensure stability of this finding  1 7 x 1 2 cm indeterminate left adrenal nodule   This does not appear to represent a lipid rich adenoma although could represent a lipid poor adenoma   This can be reevaluated for stability at time of follow-up above      CT needle biopsy liver - Successful CT-guided biopsy  Attempted drainage which was not possible as this is a solid lesion      EKG - Sinus tachycardia  Nonspecific T wave abnormality  Abnormal ECG     ED Treatment:       Medication Administration - No Administrations Displayed (No Start Event Found)      None          Past Medical/Surgical History:         Active Ambulatory Problems     Diagnosis Date Noted    Severe sepsis ([address] ) 12/10/2017    Type 2 diabetes mellitus with hyperglycemia ([address] ) 12/10/2017    HTN (hypertension) 12/10/2017    ALDEN (acute kidney injury) ([address] ) 12/10/2017    Hypothyroidism 12/10/2017           Resolved Ambulatory Problems     Diagnosis Date Noted    No Resolved Ambulatory Problems           Past Medical History:   Diagnosis Date    BPH (benign prostatic hyperplasia)      Diabetes mellitus ([address] )      Hypertension      Hypothyroidism        Admitting Diagnosis: Sepsis ([facility] ) [A41 9]     Age/Sex: 65 y o  male     Assessment/Plan:  65 y o  M presenting w/ sepsis secondary to liver mass/abscess   Plan:      Neuro:  //Analgesia: Tylenol PRN     CV: Goal MAP > 65   //HTN: holding home lisinopril     //Access:    Left peripheral  Right peripheral x2                 Pulm: Goal SpO2 > 95  -Incentive spirometry     GI:   //Liver mass/abscess: IR consulted, IR guided drainage pending    -4 5 cm right hepatic lobe mass on MRI  -s/p cholecystectomy 10/25/17  [ ] AM CMP pending      //Bowel Regimen: senna prn     //nausea: zofran PRN     : Trend Is and Os  Trend UOP and BUN/creatinine   //BPH: home tamsulosin     //Indwelling Erwin present: no                  F/E/N:      //Fluids: Rate: 100 cc/hr     //Electrolytes: Replete PRN  Goals K >4 0, Mag >2 0, and Phos >3 0     //Nutrition: NPO prior to IR guided drainage      ID: Trend white count and fever curve   //Liver abscess:  - Vancomycin + Zosyn   D/C vanc once if able to post IR procedure  - lactate 1 8, not trending       Heme: Trend H and H   //Elevated PT/INR:  Vit K 10 mg IV given      //DVT ppx: to be resumed 24 hours post IR procedure        Endo:   //DM: holding home metformin, on Insulin Sliding Scale while in hospital       //Hypothyroidism: Home levothyroxine         Msk/Skin: Frequent turning and pressure offloading     //OOB if possible  //PT/OT to see patient when able     Disposition: ICU level care  VTE Pharmacologic Prophylaxis: RX contraindicated due to: going to IR  VTE Mechanical Prophylaxis: sequential compression device     Invasive lines and devices:        Invasive Devices                  Peripheral Intravenous Line                        Peripheral IV 12/09/17 Left Antecubital 1 day       Peripheral IV 12/10/17 Right Antecubital less than 1 day       Peripheral IV 12/10/17 Right Hand less than 1 day                       Code Status: Level 1 - Full Code  Given critical illness, patient length of stay will require greater than two midnights      Admission Orders:  Scheduled Meds:   chlorhexidine 15 mL Swish & Spit Q12H [facility]   enoxaparin 40 mg Subcutaneous Q24H [facility]   insulin lispro 1-5 Units Subcutaneous TID AC   levothyroxine 50 mcg Oral Early Morning   piperacillin-tazobactam 3 375 g Intravenous Q6H [facility]   tamsulosin 0 4 mg Oral Daily With Dinner      Continuous Infusions:   sodium chloride 100 mL/hr Last Rate: Stopped (12/11/17 1044)      PRN Meds:   Acetaminophen x2    calcium carbonate    ondansetron    oxyCODONE x1    senna-docusate sodium    sodium chloride (PF)  · Fentanyl  _________________________________  12/10 CT guided drainage Procedure Note  Findings: Attempted aspiration/drainage of right hepatic lesion did not yield fluid suggesting this is a solid mass  Therefore, a core biopsy was obtained for pathology  ____________________________________  12/11 Critical Care Progress Note  Assessment: sepsis secondary to liver mass/abscess     Plan:      Neuro:  //Analgesia: Tylenol and 5 mg oxy PRN      CV: Goal MAP > 65   - pressures have been stable without need for pressors   //HTN: holding home lisinopril     //Access:    Left peripheral  Right peripheral x2      Pulm: Goal SpO2 > 95  -Incentive spirometry     GI:   //Liver mass/abscess:  -4 4 x 4 2 x 4 5 cm right anterior hepatic mass consistent with abscess after cholecystectomy, but MRI findings suggestive of solid mass  1 7 x 1 3 cm posterior right hepatic lobe nodule suggestive of cavernous hemangioma  - IR attempted aspiration of right hepatic lesion, did not yield fluid, suggesting solid mass  [ ] follow up tissue biopsy and tissue culture results  - AST and ALT elevated s/p IR procedure     //Bowel Regimen: senna prn     //nausea: zofran PRN     : Trend Is and Os  Trend UOP and BUN/creatinine   //BPH: home tamsulosin     //Indwelling Erwin present: no       F/E/N:      //Fluids: Rate: 100 cc/hr     //Electrolytes: Replete PRN   Goals K >4 0, Mag >2 0, and Phos >3 0     //Nutrition: NPO prior to IR guided drainage  Advance diet today      ID: Trend white count and fever curve    -afebrile post procedure, trending white count      //Liver abscess:  - Vancomycin + Zosyn  D/C vanc once if able to post IR procedure  - lactate 1 3, not trending       Heme: Trend H and H   //Elevated PT/INR:  Vit K 10 mg IV given 12/10     //DVT ppx:  resume 24 hours post IR procedure      Endo:   //DM: holding home metformin, on Insulin Sliding Scale while in hospital       //Hypothyroidism: Home levothyroxine       Msk/Skin: Frequent turning and pressure offloading     //OOB if possible   //PT/OT to see patient when able                 Disposition: Likely stepdown or med surg      Chief Complaint: Mild abdominal pain      HPI/24hr events:  Returned from IR appearing better  Systolic BP in 607H  IR did not find a hepatic abscess to drain, unable to aspirate purulent material  Tissue biopsies and cultures sent  Post op end points wnl  Lactate was not elevated, no Hg drop  Required 1 dose of 5 mg oxy for pain control  Elisabet Darby RN Registered Nurse Signed   Case Management Date of Service: 12/15/2017 12:04 PM         []Hide copied text     St  [address] [facility] in the [facility] by Arnold Ulrich for 2017  Network Utilization Review Department  Phone: [phone]; Fax [phone]  ATTENTION: The Network Utilization Review Department is now centralized for our 7 Facilities  Make a note that we have a new phone and fax numbers for our Department  Please call with any questions or concerns to [phone] and carefully follow the prompts so that you are directed to the right person  All voicemails are confidential  Fax any determinations, approvals, denials, and requests for initial or continue stay review clinical to [phone]   Due to HIGH CALL volume, it would be easier if you could please send faxed requests to expedite your requests and in part, help us provide discharge notifications faster         Continued Stay Review     Date: 12/15/17 Friday ACUTE MED SURG LEVEL OF CARE     Vital Signs: /79   Pulse 100   Temp 98 1 °F (36 7 °C) (Oral)   Resp 18   Ht 5' 7" (1 702 m)   Wt 63 kg (138 lb 14 2 oz)   SpO2 96%   BMI 21 75 kg/m²       :   Temp (24hrs), Av 3 °F (36 8 °C), Min:97 6 °F (36 4 °C), Max:98 9 °F (37 2 °C)   HR:  [] 103  Resp:  [18] 18  BP: (131-179)/(79-85) 179/79  SpO2:  [92 %-94 %] 94 %  Body mass index is 21 79 kg/m²       Input and Output Summary (last 24 hours):   Last data filed at 17 1301    Gross per 24 hour   Intake              780 ml   Output             1600 ml   Net             -820 ml         Diet Jason/CHO Controlled; Consistent Carbohydrate Diet Level 2 (5 carb servings/75 grams CHO/meal);  Vegetarian      Dietary nutrition supplements Glucerna TID with Meals       IV ACCESS     Medications:   Scheduled Meds:   cefTRIAXone 2,000 mg Intravenous Q24H   enoxaparin 40 mg Subcutaneous Q24H SILVINO   insulin lispro 1-5 Units Subcutaneous TID AC   insulin lispro 1-5 Units Subcutaneous HS   levothyroxine 50 mcg Oral Early Morning   lisinopril 10 mg Oral Q12H SILVINO   tamsulosin 0 4 mg Oral Daily With Dinner      PRN Meds:     acetaminophen    calcium carbonate    ondansetron    oxyCODONE    senna-docusate sodium    sodium chloride (PF)        LABS/Diagnostic Results:  CBC  Results from last 7 days  Lab Units 17  0555   WBC Thousand/uL 13 12*   HEMOGLOBIN g/dL 11 1*   HEMATOCRIT % 32 6*   PLATELETS Thousands/uL 236   NEUTROS PCT % 77*   LYMPHS PCT % 12*   MONOS PCT % 10   EOS PCT % 1      CMP  Results from last 7 days  Lab Units 17  0555   SODIUM mmol/L 137   POTASSIUM mmol/L 3 3*   CHLORIDE mmol/L 105   CO2 mmol/L 26   BUN mg/dL 9   CREATININE mg/dL 0 86   CALCIUM mg/dL 8 6   TOTAL PROTEIN g/dL 6 2*   BILIRUBIN TOTAL mg/dL 0 67   ALK PHOS U/L 111   ALT U/L 66   AST U/L 26   GLUCOSE RANDOM mg/dL 103      Lab Units 12/09/17  2354   INR   1 34*      Blood Culture:         Lab Results   Component Value Date     BLOODCX No Growth at 72 hrs  12/10/2017     BLOODCX No Growth After 4 Days  12/10/2017     BLOODCX No Growth After 4 Days  12/09/2017     BLOODCX No Growth After 4 Days   12/09/2017            Age/Sex: 65 y o  male      Assessment/Plan (per recent Int Med MD progress note):   Assessment:   Principal Problem:    Severe sepsis ([facility] )  Active Problems:    Type 2 diabetes mellitus with hyperglycemia ([facility] )    HTN (hypertension)    ALDEN (acute kidney injury) ([facility] )    Liver mass    Cholelithiasis with biliary obstruction S/P Cholecystectomy 6 weeks ago    Anemia    Hypokalemia      Plan:   1   Severe sepsis, present on admission in [facility], suspect secondary to early hepatic infection:  Infectious disease doctor on board   Continue with present intravenous antibiotic   Follow final results of the cultures and liver biopsy cultures and adjust antibiotics as needed   Monitor CBC with differential count and CMP      2   Hypodense liver mass, previous biopsy appears consistent with abscess   However, we cannot totally rule out possibility of malignancy; patient had recent episode of cholecystitis and cholangitis with biliary obstruction status post ERCP with stenting; liver function tests had improved:  Follow-up final liver biopsy cultures   Infectious Disease doctor is recommending a repeat CT scan with contrast to see if anything has liquified   However, according to patient's outpatient doctors, patient had anaphylaxis with IV dye and iodine before   Thus, they asked me if an MRI may be ordered instead   I spoke to our advance practitioner for GI and from our discussion, will order an MRI of the abdomen   This even though that patient had a CT scan with an IV dye here without any significant allergic or adverse reaction   Infectious Disease doctor is also okay with the MRI of the abdomen   Patient's outpatient doctors were also asking me if patient stents need to be removed at this point prior to patient going back to [place] and if patient needs a repeat ERCP at this point  Mikel Andino will have GI to be on board      3   Acute kidney injury, resolved, likely due to patient's previous sepsis and hypotension:  Monitor patient's BMP and input and output      4   Diabetes mellitus type 2 with hyperglycemia[de-identified]  Continue insulin treatment   Will adjust this accordingly   Blood sugars are acceptable levels   Continue holding off metformin      5   Anemia, stable, possible hemodilution from IV fluids; no active bleeding:  Recheck patient's CBC   For further workup and management if this worsens significantly      6   Hypokalemia:  Replace patient's potassium      7   Poor oral intake with dysphagia with some coughing with eating and taking fluids:  Will have speech therapist to evaluate this patient as well as GI evaluation      Note:  Patient had an episode of loose stools today   If this progress or worsens to keith diarrhea, will send stools for C diff PCR      VTE Pharmacologic Prophylaxis:   Pharmacologic: Enoxaparin (Lovenox)  Mechanical: Mechanical VTE prophylaxis in place       Current Patient Status: Inpatient   Certification Statement: The patient will continue to require additional inpatient hospital stay due to Above findings and plans           Infectious Disease   Progress Notes Date of Service: 12/14/2017 10:07 AM      Impression/Plan:  1   Severe sepsis-POA at [facility], leukocytosis, elevated lactate   Suspect secondary to early hepatic infection    No other clear sources appreciated  Thus far the patient's blood cultures have remained negative and he has become hemodynamically stable and nontoxic   He seems to be tolerating the antibiotics without difficulty   The lactate level normalized   The fever seems to be resolving  -continue ceftriaxone for now  -follow-up final blood cultures and liver biopsy cultures and adjust antibiotics as needed  -monitor CBC with diff and CMP  -supportive care  -additional workup as below     2   Hypodense liver mass-Biopsy appears consistent with abscess   Cannot exclude adjacent malignancy   With the patient having no liver mass back in late October, it is unlikely that this represents of malignancy   This more likely represents an inflammatory/infectious process like a phlegmon that has yet to become liquified into an abscess   No other clear source is appreciated  -follow up final liver biopsy cultures  -antibiotics as above for now  -recommend repeat CT of the abdomen with contrast to see if anything is liquified  -no additional ID workup for now     3   Acute kidney injury-likely secondary to sepsis with hypotension   Pre renal issues may have significantly played a role   The renal function has improved and now stabilized  -monitor the GFR  -no need to dose adjust ceftriaxone for renal function  -no additional ID workup for now     4   Recent cholecystitis/cholangitis-with biliary obstruction   Status post ERCP with stenting   Possibly all related to 1  Transaminases are elevated however there does not appear to be an obstructive pattern, and imaging does not reveal any ongoing obstruction   the liver function tests have significantly improved    -monitor liver function test  -no additional ID workup for now  -outpatient GI follow-up     5   Diabetes mellitus-type 2 with hyperglycemia     Antibiotics:  Ceftriaxone 3  Antibiotics 6        Discharge Plan:   [facility] [address]     PER PT TODAY 12/1/5/17      Plan   Progress Discontinue PT   Recommendation   Recommendation Home with family support         CASE MANAGEMENT FOLLOWING CLOSELY FOR ALL DISCHARGE NEEDS      [address] in the [facility] by Arnold Ulrich for 2017  Network Utilization Review Department  Phone: [phone]; Fax [phone]  ATTENTION: The Network Utilization Review Department is now centralized for our 7 Facilities  Make a note that we have a new phone and fax numbers for our Department  Please call with any questions or concerns to [phone] and carefully follow the prompts so that you are directed to the right person  All voicemails are confidential  Fax any determinations, approvals, denials, and requests for initial or continue stay review clinical to [phone]   Due to HIGH CALL volume, it would be easier if you could please send faxed requests to expedite your requests and in part, help us provide discharge notifications faster

## 2017-12-19 NOTE — PROGRESS NOTES
Progress Note - Infectious Disease   Eulogio Hunter 65 y o  male MRN: [id number]  Unit/Bed#: [facility] POOL Encounter: [id number]      Impression/Plan:  1   Severe sepsis-POA at [facility] [place], leukocytosis, elevated lactate   Suspect secondary to early hepatic infection  No other clear sources appreciated  Thus far the patient's blood cultures have remained negative and he has become hemodynamically stable and nontoxic   He seems to be tolerating the antibiotics without difficulty   The lactate level normalized   The fever has resolved   He was not bacteremic   -continue ceftriaxone for now  -monitor CBC with diff and CMP  -supportive care     2   Hypodense liver mass-Biopsy appears consistent with abscess   Cannot exclude adjacent malignancy   With the patient having no liver mass back in late October, it is unlikely that this represents of malignancy, however perhaps there is more than 1 process  This more likely represents an inflammatory/infectious process like a phlegmon that has yet to become liquified into an abscess  No other clear source is appreciated   The liver biopsy cultures are negative  The repeat MRI is once again inconclusive   -antibiotics as above for now  -recommend interventional Radiology re-evaluation tomorrow to see if any drainable collection  -no additional ID workup for now  -plan at least 2 weeks of antibiotics  -if no drainable collection, no need to repeat attempted biopsy, but family prefers this to be done in [place]      3   Recent cholecystitis/cholangitis-with biliary obstruction   Status post ERCP with stenting   Possibly all related to 1  Transaminases are elevated however there does not appear to be an obstructive pattern, and imaging does not reveal any ongoing obstruction  The liver function tests have significantly improved    -monitor liver function test  -no additional ID workup for now  -GI follow-up  -patient for ERCP today with removal of stent     4   Diabetes mellitus-type 2 with hyperglycemia    Antibiotics:  Ceftriaxone 8  Antibiotics 10    Subjective:  Patient has no fever, chills, sweats; no nausea, vomiting, diarrhea; no cough, shortness of breath; no pain  No new symptoms  He is sitting in a chair and appears in good spirits  Objective:  Vitals:  HR:  [94-96] 96  Resp:  [16-18] 16  BP: (157-175)/(71-79) 171/75  SpO2:  [97 %-99 %] 98 %  Temp (24hrs), Av 4 °F (36 9 °C), Min:97 8 °F (36 6 °C), Max:99 2 °F (37 3 °C)  Current: Temperature: 98 1 °F (36 7 °C)    Physical Exam:   General Appearance:  Alert, nontoxic, no acute distress  Throat: Oropharynx moist without lesions  Lungs:   Clear to auscultation bilaterally; respirations unlabored   Heart:  RRR; no murmur, rub or gallop   Abdomen:   Soft, non-tender, non-distended, positive bowel sounds  Extremities: No clubbing, cyanosis or edema   Skin: No new rashes or lesions  No draining wounds noted  Labs, Imaging, & Other studies:   All pertinent labs and imaging studies were personally reviewed    Results from last 7 days  Lab Units 17  0607 17  0635 17  0551  17  0458   WBC Thousand/uL 12 37* 10 17*  --   --  10 07   HEMOGLOBIN g/dL 10 6* 10 3* 10 6*  < > 10 7*   PLATELETS Thousands/uL 411* 404*  --   --  345   < > = values in this interval not displayed      Results from last 7 days  Lab Units 17  0514 17  0458 12/15/17  0559 17  0555   SODIUM mmol/L 137 138 138 137   POTASSIUM mmol/L 3 8 3 5 3 6 3 3*   CHLORIDE mmol/L 102 102 104 105   CO2 mmol/L 31 30 27 26   ANION GAP mmol/L 4 6 7 6   BUN mg/dL 10 10 9 9   CREATININE mg/dL 0 82 0 84 0 84 0 86   EGFR ml/min/1 73sq m 85 84 84 83   GLUCOSE RANDOM mg/dL 120 116 119 103   CALCIUM mg/dL 8 4 8 7 8 8 8 6   AST U/L  --  26 33 26   ALT U/L  --  56 66 66   ALK PHOS U/L  --  118* 124* 111   TOTAL PROTEIN g/dL  --  6 4 6 6 6 2*   ALBUMIN g/dL  --  2 2* 2 2* 2 1*   BILIRUBIN TOTAL mg/dL  --  0 43 0 46 0 67

## 2017-12-19 NOTE — ASSESSMENT & PLAN NOTE
· Blood glucose acceptable  · Was on metformin 500 mg daily prior to admission    Will resume on discharge  · Continue SSI

## 2017-12-19 NOTE — ASSESSMENT & PLAN NOTE
· Hypotensive liver mass status post biopsy which appeared consistent with abscess  Malignancy however could not be excluded although less likely  · Repeat MRI on 12/15/17 was inconclusive although most suggestive of abscess  · For IR Re evaluation today to see if any drainable collection  · Holding off on repeat biopsy at this time as patient and family will prefer this to be done in [place] where he resides    · He will be continued on IV ceftriaxone for now with planned 2 week antibiotic course per ID

## 2017-12-19 NOTE — ANESTHESIA POSTPROCEDURE EVALUATION
Post-Op Assessment Note      CV Status:  Stable    Mental Status:  Alert and awake    Hydration Status:  Euvolemic    PONV Controlled:  Controlled    Airway Patency:  Patent    Post Op Vitals Reviewed: Yes          Staff: CRNA, Anesthesiologist           BP (!) 85/52 (12/19/17 1521)    Temp      Pulse 93 (12/19/17 1521)   Resp 20 (12/19/17 1521)    SpO2 98 % (12/19/17 1521)

## 2017-12-20 PROBLEM — K26.9 DUODENAL ULCER: Status: ACTIVE | Noted: 2017-12-20

## 2017-12-20 PROBLEM — N99.89 POSTPROCEDURAL URINARY RETENTION: Status: ACTIVE | Noted: 2017-12-20

## 2017-12-20 PROBLEM — R33.8 POSTPROCEDURAL URINARY RETENTION: Status: ACTIVE | Noted: 2017-12-20

## 2017-12-20 LAB
ERYTHROCYTE [DISTWIDTH] IN BLOOD BY AUTOMATED COUNT: 14.4 % (ref 11.6–15.1)
GLUCOSE SERPL-MCNC: 133 MG/DL (ref 65–140)
GLUCOSE SERPL-MCNC: 168 MG/DL (ref 65–140)
GLUCOSE SERPL-MCNC: 214 MG/DL (ref 65–140)
GLUCOSE SERPL-MCNC: 230 MG/DL (ref 65–140)
HCT VFR BLD AUTO: 34.3 % (ref 36.5–49.3)
HGB BLD-MCNC: 11.1 G/DL (ref 12–17)
MCH RBC QN AUTO: 26.2 PG (ref 26.8–34.3)
MCHC RBC AUTO-ENTMCNC: 32.4 G/DL (ref 31.4–37.4)
MCV RBC AUTO: 81 FL (ref 82–98)
PLATELET # BLD AUTO: 502 THOUSANDS/UL (ref 149–390)
PMV BLD AUTO: 9.7 FL (ref 8.9–12.7)
RBC # BLD AUTO: 4.23 MILLION/UL (ref 3.88–5.62)
WBC # BLD AUTO: 11.67 THOUSAND/UL (ref 4.31–10.16)

## 2017-12-20 PROCEDURE — 82948 REAGENT STRIP/BLOOD GLUCOSE: CPT

## 2017-12-20 PROCEDURE — 85027 COMPLETE CBC AUTOMATED: CPT | Performed by: INTERNAL MEDICINE

## 2017-12-20 RX ORDER — FINASTERIDE 5 MG/1
5 TABLET, FILM COATED ORAL DAILY
Status: DISCONTINUED | OUTPATIENT
Start: 2017-12-20 | End: 2017-12-22 | Stop reason: HOSPADM

## 2017-12-20 RX ADMIN — Medication 250 MG: at 08:13

## 2017-12-20 RX ADMIN — ENOXAPARIN SODIUM 40 MG: 40 INJECTION SUBCUTANEOUS at 08:13

## 2017-12-20 RX ADMIN — CEFTRIAXONE 2000 MG: 2 INJECTION, SOLUTION INTRAVENOUS at 17:01

## 2017-12-20 RX ADMIN — Medication 250 MG: at 18:41

## 2017-12-20 RX ADMIN — INSULIN LISPRO 1 UNITS: 100 INJECTION, SOLUTION INTRAVENOUS; SUBCUTANEOUS at 13:30

## 2017-12-20 RX ADMIN — INSULIN LISPRO 2 UNITS: 100 INJECTION, SOLUTION INTRAVENOUS; SUBCUTANEOUS at 21:58

## 2017-12-20 RX ADMIN — PANTOPRAZOLE SODIUM 40 MG: 40 TABLET, DELAYED RELEASE ORAL at 16:54

## 2017-12-20 RX ADMIN — LEVOTHYROXINE SODIUM 50 MCG: 50 TABLET ORAL at 05:20

## 2017-12-20 RX ADMIN — ONDANSETRON 4 MG: 4 TABLET, ORALLY DISINTEGRATING ORAL at 07:13

## 2017-12-20 RX ADMIN — TAMSULOSIN HYDROCHLORIDE 0.4 MG: 0.4 CAPSULE ORAL at 16:54

## 2017-12-20 RX ADMIN — FINASTERIDE 5 MG: 5 TABLET, FILM COATED ORAL at 16:54

## 2017-12-20 RX ADMIN — LISINOPRIL 10 MG: 10 TABLET ORAL at 08:13

## 2017-12-20 RX ADMIN — LISINOPRIL 10 MG: 10 TABLET ORAL at 21:58

## 2017-12-20 RX ADMIN — INSULIN LISPRO 1 UNITS: 100 INJECTION, SOLUTION INTRAVENOUS; SUBCUTANEOUS at 16:56

## 2017-12-20 RX ADMIN — PANTOPRAZOLE SODIUM 40 MG: 40 TABLET, DELAYED RELEASE ORAL at 05:20

## 2017-12-20 NOTE — PROGRESS NOTES
Progress Note - Josettenclyric Backers 1940, 65 y o  male MRN: [id number]    Unit/Bed#: -01 Encounter: [id number]    Primary Care Provider: No primary care provider on file  Date and time admitted to hospital: 12/10/2017  2:29 PM    * Severe sepsis [facility])   Assessment & Plan    · POA at [facility] [place]  · Suspected secondary to hepatic infection  · Sepsis is resolved and patient continues on IV ceftriaxone for hepatic infection        Liver mass   Assessment & Plan    · Hypotensive liver mass status post biopsy which appeared consistent with abscess  Malignancy however could not be excluded although less likely  · Repeat MRI on 12/15/17 was inconclusive although most suggestive of abscess  · I discussed with IR and they will re-evaluate today to assess for any drainable collection  · Holding off on repeat biopsy at this time as patient and family will prefer this to be done in [place] where he resides  · He will require a repeat MRI of the abdomen after completion of antibiotic course  This will likely be done in [place]  · Continue IV ceftriaxone for now per ID with eventual plans for transition to oral antibiotics    Discussed with Dr Pete Eisenberg        Cholelithiasis with biliary obstruction S/P Cholecystectomy 6 weeks ago   Assessment & Plan    · He is status post ERCP with removal of stent on 12/19/17  · Liver enzymes/bilirubin within normal limits        Type 2 diabetes mellitus with hyperglycemia (HCC)   Assessment & Plan    · Blood glucose remains acceptable  · Resume metformin on discharge  · Continue SSI         Anemia   Assessment & Plan    · HGB stable at 11  · Fecal occult blood negative x1          HTN (hypertension)   Assessment & Plan    · Continue lisinopril        Duodenal ulcer   Assessment & Plan    · Small clean based duodenal ulcer noted on ERCP  · No evidence of bleeding  · H pylori and biopsies pending     Postprocedural Urinary retention   Assessment & Plan · Has had similar problem in the past postop  · Continue Flomax  · Voiding trial in the morning          VTE Pharmacologic Prophylaxis:   Pharmacologic: Enoxaparin (Lovenox)  Mechanical VTE Prophylaxis in Place: Yes    Patient Centered Rounds: I have performed bedside rounds with nursing staff today  Discussions with Specialists or Other Care Team Provider:  Nursing    Education and Discussions with Family / Patient:  Patient, spouse at the bedside and daughter Dr Latha Alejandro on phone  All questions answered  They were updated on plan of care  Current Length of Stay: 10 day(s)    Current Patient Status: Inpatient   Certification Statement: The patient will continue to require additional inpatient hospital stay due to Sepsis    Discharge Plan:  Anticipate next 24-48 hours if remains stable and no evidence of drainable collection    Code Status: Level 1 - Full Code    Subjective:   Patient with urinary retention overnight requiring Erwin catheter placement after straight caths  Otherwise is without any new complaints  He denies nausea vomiting, no abdominal pain, no fever or chills  Objective:     Vitals:   Temp (24hrs), Av °F (36 7 °C), Min:97 5 °F (36 4 °C), Max:98 7 °F (37 1 °C)    HR:  [86-97] 93  Resp:  [16-22] 18  BP: ()/(52-84) 169/74  SpO2:  [94 %-99 %] 94 %  Body mass index is 22 08 kg/m²  Input and Output Summary (last 24 hours):        Intake/Output Summary (Last 24 hours) at 17 1304  Last data filed at 17 1157   Gross per 24 hour   Intake              780 ml   Output             6275 ml   Net            -5495 ml       Physical Exam:  General Appearance:    Alert, cooperative, no distress, appropriately responsive    Head:    Normocephalic, without obvious abnormality, atraumatic, mucous membranes moist    Eyes:    Conjunctiva/corneas clear, EOM's intact   Neck:   Supple   Lungs:     Clear to auscultation bilaterally, respirations unlabored, no crackles or wheeze Heart:    Regular rate and rhythm, S1 and S2    Abdomen:    :     Soft, non-tender, bowel sounds active all four quadrants,     no masses, no organomegaly    Erwin catheter in place draining clear urine   Extremities:   Extremities normal, atraumatic, no cyanosis or edema   Neurologic:  nonfocal         Additional Data:     Labs:      Results from last 7 days  Lab Units 12/20/17  0454  12/14/17  0555   WBC Thousand/uL 11 67*  < > 13 12*   HEMOGLOBIN g/dL 11 1*  < > 11 1*   HEMATOCRIT % 34 3*  < > 32 6*   PLATELETS Thousands/uL 502*  < > 236   NEUTROS PCT %  --   --  77*   LYMPHS PCT %  --   --  12*   MONOS PCT %  --   --  10   EOS PCT %  --   --  1   < > = values in this interval not displayed  Results from last 7 days  Lab Units 12/17/17  0514 12/16/17  0458   SODIUM mmol/L 137 138   POTASSIUM mmol/L 3 8 3 5   CHLORIDE mmol/L 102 102   CO2 mmol/L 31 30   BUN mg/dL 10 10   CREATININE mg/dL 0 82 0 84   CALCIUM mg/dL 8 4 8 7   TOTAL PROTEIN g/dL  --  6 4   BILIRUBIN TOTAL mg/dL  --  0 43   ALK PHOS U/L  --  118*   ALT U/L  --  56   AST U/L  --  26   GLUCOSE RANDOM mg/dL 120 116           * I Have Reviewed All Lab Data Listed Above  * Additional Pertinent Lab Tests Reviewed: Riteshchristina 65 Admission Reviewed    Cultures:   Blood Culture:   Lab Results   Component Value Date    BLOODCX No Growth After 5 Days  12/10/2017    BLOODCX No Growth After 5 Days  12/10/2017    BLOODCX No Growth After 5 Days  12/09/2017    BLOODCX No Growth After 5 Days   12/09/2017     Urine Culture: No results found for: URINECX  Sputum Culture: No components found for: SPUTUMCX  Wound Culture: No results found for: WOUNDCULT    Last 24 Hours Medication List:     cefTRIAXone 2,000 mg Intravenous Q24H   enoxaparin 40 mg Subcutaneous Q24H [address]   insulin lispro 1-5 Units Subcutaneous TID AC   insulin lispro 1-5 Units Subcutaneous HS   levothyroxine 50 mcg Oral Early Morning   lisinopril 10 mg Oral Q12H SILVINO   ondansetron 4 mg Oral TID AC   pantoprazole 40 mg Oral BID AC   saccharomyces boulardii 250 mg Oral BID   tamsulosin 0 4 mg Oral Daily With Dinner        Today, Patient Was Seen By: Joaquin Bhatti MD    ** Please Note: Dragon 360 Dictation voice to text software may have been used in the creation of this document   **

## 2017-12-20 NOTE — CASE MANAGEMENT
[address] [facility] in the [facility] by Arnold Ulrich for 2017  Network Utilization Review Department  Phone: [phone]; Fax [phone]  ATTENTION: The Network Utilization Review Department is now centralized for our 7 Facilities  Make a note that we have a new phone and fax numbers for our Department  Please call with any questions or concerns to [phone] and carefully follow the prompts so that you are directed to the right person  All voicemails are confidential  Fax any determinations, approvals, denials, and requests for initial or continue stay review clinical to [phone]  Due to HIGH CALL volume, it would be easier if you could please send faxed requests to expedite your requests and in part, help us provide discharge notifications faster  Continued Stay Review    Date:    17 ACUTE MED SURG LEVEL OF CARE    Vital Signs: /74   Pulse 93   Temp 98 7 °F (37 1 °C) (Oral)   Resp 18   Ht 5' 7" (1 702 m)   Wt 64 kg (141 lb)   SpO2 94%   BMI 22 08 kg/m²     Vitals:   Temp (24hrs), Av 6 °F (37 °C), Min:98 1 °F (36 7 °C), Max:99 2 °F (37 3 °C)   HR:  [95-96] 96  Resp:  [16-18] 16  BP: (160-175)/(71-79) 171/75  SpO2:  [97 %-99 %] 98 %  Body mass index is 22 08 kg/m²       Input and Output Summary (last 24 hours):   Last data filed at 17 1205    Gross per 24 hour   Intake              225 ml   Output             2200 ml   Net            -1975 ml     Diet Jason/CHO Controlled; Consistent Carbohydrate Diet Level 2 (5 carb servings/75 grams CHO/meal);  Vegetarian      Dietary nutrition supplements Glucerna TID with Meals    Continuous IV Infusions:   sodium chloride 75 mL/hr Last Rate: Stopped (17 1709)     Medications:   Scheduled Meds:   cefTRIAXone 2,000 mg Intravenous Q24H   enoxaparin 40 mg Subcutaneous Q24H SILVINO   insulin lispro 1-5 Units Subcutaneous TID AC   insulin lispro 1-5 Units Subcutaneous HS levothyroxine 50 mcg Oral Early Morning   lisinopril 10 mg Oral Q12H SILVINO   ondansetron 4 mg Oral TID AC   pantoprazole 40 mg Oral BID AC   saccharomyces boulardii 250 mg Oral BID   tamsulosin 0 4 mg Oral Daily With Dinner     PRN Meds:     acetaminophen    calcium carbonate    ondansetron    oxyCODONE    senna-docusate sodium    sodium chloride (PF)      LABS/Diagnostic Results:   CBC  esults from last 7 days  Lab Units 12/14/17  0555   WBC Thousand/uL 13 12*   HEMOGLOBIN g/dL 11 1*   HEMATOCRIT % 32 6*   PLATELETS Thousands/uL 236   NEUTROS PCT % 77*   LYMPHS PCT % 12*   MONOS PCT % 10   EOS PCT % 1      CMP  Results from last 7 days  Lab Units 12/14/17  0555   SODIUM mmol/L 137   POTASSIUM mmol/L 3 3*   CHLORIDE mmol/L 105   CO2 mmol/L 26   BUN mg/dL 9   CREATININE mg/dL 0 86   CALCIUM mg/dL 8 6   TOTAL PROTEIN g/dL 6 2*   BILIRUBIN TOTAL mg/dL 0 67   ALK PHOS U/L 111   ALT U/L 66   AST U/L 26   GLUCOSE RANDOM mg/dL 103      Lab Units 12/09/17  2354   INR   1 34*       Microbiology:  Lab Results   Component Value Date     BLOODCX No Growth at 72 hrs  12/10/2017     BLOODCX No Growth After 4 Days  12/10/2017     BLOODCX No Growth After 4 Days  12/09/2017     BLOODCX No Growth After 4 Days  12/09/2017       Age/Sex: 65 y o  male     Assessment/Plan (per recent int Med MD progress note): * Severe sepsis ([address] )   Assessment & Plan     · POA at [facility] [place]  · Suspected secondary to hepatic infection  · Sepsis is resolved and patient continues on IV ceftriaxone for hepatic infection       Liver mass   Assessment & Plan     · Hypotensive liver mass status post biopsy which appeared consistent with abscess    Malignancy however could not be excluded although less likely  · Repeat MRI on 12/15/17 was inconclusive although most suggestive of abscess  · For IR Re evaluation today to see if any drainable collection  · Holding off on repeat biopsy at this time as patient and family will prefer this to be done in [place] where he resides  · He will be continued on IV ceftriaxone for now with planned 2 week antibiotic course per ID       Cholelithiasis with biliary obstruction S/P Cholecystectomy 6 weeks ago   Assessment & Plan     · For ERCP with removal of stent scheduled today  · Liver enzymes/bilirubin within normal limits       Type 2 diabetes mellitus with hyperglycemia (HCC)   Assessment & Plan     · Blood glucose acceptable  · Was on metformin 500 mg daily prior to admission  Will resume on discharge  · Continue SSI        Anemia   Assessment & Plan     · HGB stable at 10  · Fecal occult blood negative x1       HTN (hypertension)   Assessment & Plan     · Continue lisinopril          VTE Pharmacologic Prophylaxis:   Pharmacologic: Enoxaparin (Lovenox)  Mechanical VTE Prophylaxis in Place:  Yes      Current Patient Status: Inpatient   Certification Statement: The patient will continue to require additional inpatient hospital stay due to Liver abscess, sepsis        Discharge Plan:   [address]     PER PT TODAY 12/1/5/17      Plan   Progress Discontinue PT   Recommendation   Recommendation Home with family support         CASE MANAGEMENT FOLLOWING CLOSELY FOR ALL DISCHARGE NEEDS

## 2017-12-20 NOTE — ASSESSMENT & PLAN NOTE
· He is status post ERCP with removal of stent on 12/19/17  · Liver enzymes/bilirubin within normal limits

## 2017-12-20 NOTE — PROGRESS NOTES
Progress Note - Infectious Disease   Eulogio Hunter 65 y o  male MRN: [id number]  Unit/Bed#: -01 Encounter: [id number]      Impression/Plan:  1   Severe sepsis-POA at [facility], leukocytosis, elevated lactate   Suspect secondary to early hepatic infection  No other clear sources appreciated  Thus far the patient's blood cultures have remained negative and he has become hemodynamically stable and nontoxic   He seems to be tolerating the antibiotics without difficulty   The lactate level normalized   The fever has resolved   He was not bacteremic   -continue ceftriaxone for now  -monitor CBC with diff and CMP  -supportive care     2   Hypodense liver mass-Biopsy appears consistent with abscess   Cannot exclude adjacent malignancy   With the patient having no liver mass back in late October, it is unlikely that this represents of malignancy, however perhaps there is more than 1 process  This more likely represents an inflammatory/infectious process like a phlegmon that has yet to become liquified into an abscess  No other clear source is appreciated   The liver biopsy cultures are negative  The repeat MRI is once again inconclusive   -antibiotics as above for now  -await interventional Radiology re-evaluation to see if any drainable collection  -no additional ID workup for now  -plan at least 2 weeks of antibiotics     3   Recent cholecystitis/cholangitis-with biliary obstruction   Status post ERCP with stenting   Possibly all related to 1  Transaminases are elevated however there does not appear to be an obstructive pattern, and imaging does not reveal any ongoing obstruction  The liver function tests have significantly improved  The ERCP from yesterday when he and his stent removed did not show any more obstruction    -monitor liver function test  -no additional ID workup for now  -GI follow-up     4   Diabetes mellitus-type 2 with hyperglycemia    Antibiotics:  Ceftriaxone 9  Antibiotics 11    Subjective:  Patient has no fever, chills, sweats; no nausea, vomiting, diarrhea; no cough, shortness of breath; no pain  No new symptoms  He is in good spirits  He underwent ERCP that was nondiagnostic for any obstruction    Objective:  Vitals:  HR:  [86-97] 93  Resp:  [18-22] 18  BP: ()/(52-84) 169/74  SpO2:  [94 %-99 %] 94 %  Temp (24hrs), Av 9 °F (36 6 °C), Min:97 5 °F (36 4 °C), Max:98 7 °F (37 1 °C)  Current: Temperature: 97 5 °F (36 4 °C)    Physical Exam:   General Appearance:  Alert, nontoxic, no acute distress  Sitting in a chair and in good spirits   Throat: Oropharynx moist without lesions  Lungs:   Clear to auscultation bilaterally, no audible wheezes, rhonchi or rales; respirations unlabored   Heart:  RRR; no murmur, rub or gallop   Abdomen:   Soft, non-tender, non-distended, positive bowel sounds  Extremities: No clubbing, cyanosis or edema   Skin: No new rashes or lesions  No draining wounds noted         Labs, Imaging, & Other studies:   All pertinent labs and imaging studies were personally reviewed    Results from last 7 days  Lab Units 17  0454 17  0607 17  0635   WBC Thousand/uL 11 67* 12 37* 10 17*   HEMOGLOBIN g/dL 11 1* 10 6* 10 3*   PLATELETS Thousands/uL 502* 411* 404*       Results from last 7 days  Lab Units 17  0514 17  0458 12/15/17  0559 17  0555   SODIUM mmol/L 137 138 138 137   POTASSIUM mmol/L 3 8 3 5 3 6 3 3*   CHLORIDE mmol/L 102 102 104 105   CO2 mmol/L 31 30 27 26   ANION GAP mmol/L 4 6 7 6   BUN mg/dL 10 10 9 9   CREATININE mg/dL 0 82 0 84 0 84 0 86   EGFR ml/min/1 73sq m 85 84 84 83   GLUCOSE RANDOM mg/dL 120 116 119 103   CALCIUM mg/dL 8 4 8 7 8 8 8 6   AST U/L  --  26 33 26   ALT U/L  --  56 66 66   ALK PHOS U/L  --  118* 124* 111   TOTAL PROTEIN g/dL  --  6 4 6 6 6 2*   ALBUMIN g/dL  --  2 2* 2 2* 2 1*   BILIRUBIN TOTAL mg/dL  --  0 43 0 46 0 67         ERCP-negative for biliary obstruction

## 2017-12-20 NOTE — ASSESSMENT & PLAN NOTE
· Small clean based duodenal ulcer noted on ERCP  · No evidence of bleeding  · H pylori and biopsies pending

## 2017-12-20 NOTE — ASSESSMENT & PLAN NOTE
· POA at [facility] AT [place]  · Suspected secondary to hepatic infection  · Sepsis is resolved and patient continues on IV ceftriaxone for hepatic infection

## 2017-12-20 NOTE — ASSESSMENT & PLAN NOTE
· Hypotensive liver mass status post biopsy which appeared consistent with abscess  Malignancy however could not be excluded although less likely  · Repeat MRI on 12/15/17 was inconclusive although most suggestive of abscess  · I discussed with IR and they will re-evaluate today to assess for any drainable collection  · Holding off on repeat biopsy at this time as patient and family will prefer this to be done in [place] where he resides  · He will require a repeat MRI of the abdomen after completion of antibiotic course  This will likely be done in [place]  · Continue IV ceftriaxone for now per ID with eventual plans for transition to oral antibiotics    Discussed with Dr Estrella Ripa

## 2017-12-20 NOTE — PROGRESS NOTES
Progress Note - Carline Larose 65 y o  male MRN: [id number]    Unit/Bed#: -01 Encounter: [id number]    Subjective:     Patient seen and examined at bedside  He denies abdominal pain, nausea, and vomiting  Objective:     Vitals: Blood pressure 169/74, pulse 93, temperature 98 7 °F (37 1 °C), temperature source Oral, resp  rate 18, height 5' 7" (1 702 m), weight 64 kg (141 lb), SpO2 94 %  ,Body mass index is 22 08 kg/m²  Intake/Output Summary (Last 24 hours) at 12/20/17 1013  Last data filed at 12/20/17 0524   Gross per 24 hour   Intake              560 ml   Output             5275 ml   Net            -4715 ml       Physical Exam:     General Appearance: Alert, appears stated age and cooperative  Lungs: Clear to auscultation bilaterally, no rales or rhonchi, no labored breathing/accessory muscle use  Heart: Regular rate and rhythm, S1, S2 normal, no murmur, click, rub or gallop  Abdomen: Soft, non-tender, non-distended; bowel sounds normal; no masses or no organomegaly  Extremities: No cyanosis, edema    Invasive Devices     Peripheral Intravenous Line            Peripheral IV 12/18/17 Left Forearm 2 days          Drain            Urethral Catheter Coude 18 Fr  less than 1 day                Lab Results:    Results from last 7 days  Lab Units 12/20/17  0454  12/14/17  0555   WBC Thousand/uL 11 67*  < > 13 12*   HEMOGLOBIN g/dL 11 1*  < > 11 1*   HEMATOCRIT % 34 3*  < > 32 6*   PLATELETS Thousands/uL 502*  < > 236   NEUTROS PCT %  --   --  77*   LYMPHS PCT %  --   --  12*   MONOS PCT %  --   --  10   EOS PCT %  --   --  1   < > = values in this interval not displayed      Results from last 7 days  Lab Units 12/17/17  0514 12/16/17  0458   SODIUM mmol/L 137 138   POTASSIUM mmol/L 3 8 3 5   CHLORIDE mmol/L 102 102   CO2 mmol/L 31 30   BUN mg/dL 10 10   CREATININE mg/dL 0 82 0 84   CALCIUM mg/dL 8 4 8 7   TOTAL PROTEIN g/dL  --  6 4   BILIRUBIN TOTAL mg/dL  --  0 43   ALK PHOS U/L  --  118*   ALT U/L  -- 56   AST U/L  --  26   GLUCOSE RANDOM mg/dL 120 116               Imaging Studies: I have personally reviewed pertinent imaging studies  Xr Chest 2 Views    Result Date: 12/10/2017  Impression: No active pulmonary disease  Workstation performed: [id number]     Mri Abdomen W Wo Contrast    Result Date: 12/10/2017  Impression: 4 5 cm right hepatic lobe mass is indeterminate and restricts diffusion  Although abscess remains in the differential diagnosis, its imaging appearance slightly favor a solid mass  Needle sampling is necessary for further differentiation and therefore recommended  1 7 cm posterior right hepatic lobe lesion is difficult to characterize given its small size although has imaging features favoring cavernous hemangioma  I would recommend follow-up MRI in 3 months to ensure stability of this finding  1 7 x 1 2 cm indeterminate left adrenal nodule  This does not appear to represent a lipid rich adenoma although could represent a lipid poor adenoma  This can be reevaluated for stability at time of follow-up above  I personally discussed this study and recommendations with Dr Anabell Bonner on 12/10/2017 1:06 PM  Workstation performed: [id number]     Mri Abdomen W Wo Contrast And Mrcp    Result Date: 12/15/2017  Impression: 1   4 2 cm heterogeneously enhancing, lobulated lesion in segment VIII of the right hepatic lobe shows no significant change in size  The current study more conspicuously demonstrates areas of central enhancement that remain concerning for solid component  From an imaging standpoint, organizing abscess and metastatic lesion are still both differential considerations, though prior biopsy results are most suggestive of abscess  2   12 mm lesion in segment VII is most likely hemangioma though lack of discontinuity in early rim enhancement is atypical   Previous recommendation for 3 month follow-up is further supported   3   17 mm indeterminate left adrenal nodule is unchanged from prior  Workstation performed: [id number]     Ct Needle Biopsy Liver    Result Date: 12/11/2017  Impression: Impression: Successful CT-guided biopsy  Attempted drainage which was not possible as this is a solid lesion  Workstation performed: [id number]     Ct Abdomen Pelvis With Contrast    Result Date: 12/10/2017  Impression: 1  Enhancing hepatic lesions as described above  Differential would include atypical hemangioma, hepatic neoplasm, either primary or metastatic or hepatic abscess, given the history of fever and recent gallbladder surgery  It is recommended the patient have a follow-up MRI of the liver with gadolinium, for further evaluation  2   Prostatomegaly with probable bladder outlet obstruction  Correlation with serum PSA  3   Colonic diverticulosis  Findings are consistent with the preliminary report from [facility] which was provided shortly after completion of the exam              Workstation performed: [id number]       Assessment and Plan:    1) Hypodense liver lesion: Likely liver abscess secondary to recent ERCP  Biopsy negative for malignancy  Repeat MRI showed stable 4 2 cm hypodense lesion, cannot rule out underlying solid mass  He is non-toxic in appearance and hemodynamically stable  Afebrile on antibiotics  His abdomen is non-tender on exam      -Continue IV antibiotics as per ID  -Repeat imaging in the next few weeks     2) Choledocholithiasis: status post ERCP with stent placement on 10/25/17  Patient underwent repeat ERCP yesterday with stent removal  Fluoroscopy showed normal appearance of CBD and visualized intrahepatic branches  No evidence of stricture or filling defects       -Monitor LFTs     3) Melena likely 2/2 duodenal ulcer: EGD yesterday showed small clean based duodenal ulcer with mild gastritis   Hemoglobin stable at 11 1      -Continue Protonix 40 mg daily x 8 weeks  -Continue regular diet

## 2017-12-20 NOTE — PROGRESS NOTES
Pt c/o pain, inability to urinate more than scant amount  Bladder scanned for 500+ ml urine  Urinary protocol ordered  Attempted straight cath but was meeting too much resistance  Surgical PA notified and will be down shortly  Patient agreeable with plan and resting at this time

## 2017-12-20 NOTE — PROGRESS NOTES
Called by RN to place Erwin due to urinary retention and failed straight cath attempts  After sterile prep, 18Fr Coude catheter was placed with some resistance  Clear yellow urine return - 1100cc  Balloon was filled with 10cc saline  Patient tolerated procedure well

## 2017-12-21 ENCOUNTER — APPOINTMENT (INPATIENT)
Dept: RADIOLOGY | Facility: HOSPITAL | Age: 77
DRG: 919 | End: 2017-12-21
Payer: COMMERCIAL

## 2017-12-21 LAB
ALBUMIN SERPL BCP-MCNC: 2.5 G/DL (ref 3.5–5)
ALP SERPL-CCNC: 114 U/L (ref 46–116)
ALT SERPL W P-5'-P-CCNC: 36 U/L (ref 12–78)
ANION GAP SERPL CALCULATED.3IONS-SCNC: 6 MMOL/L (ref 4–13)
AST SERPL W P-5'-P-CCNC: 12 U/L (ref 5–45)
BILIRUB SERPL-MCNC: 0.39 MG/DL (ref 0.2–1)
BUN SERPL-MCNC: 13 MG/DL (ref 5–25)
CALCIUM SERPL-MCNC: 9 MG/DL (ref 8.3–10.1)
CHLORIDE SERPL-SCNC: 105 MMOL/L (ref 100–108)
CO2 SERPL-SCNC: 28 MMOL/L (ref 21–32)
CREAT SERPL-MCNC: 0.92 MG/DL (ref 0.6–1.3)
GFR SERPL CREATININE-BSD FRML MDRD: 79 ML/MIN/1.73SQ M
GLUCOSE SERPL-MCNC: 101 MG/DL (ref 65–140)
GLUCOSE SERPL-MCNC: 121 MG/DL (ref 65–140)
GLUCOSE SERPL-MCNC: 124 MG/DL (ref 65–140)
GLUCOSE SERPL-MCNC: 134 MG/DL (ref 65–140)
GLUCOSE SERPL-MCNC: 209 MG/DL (ref 65–140)
POTASSIUM SERPL-SCNC: 3.9 MMOL/L (ref 3.5–5.3)
PROT SERPL-MCNC: 6.7 G/DL (ref 6.4–8.2)
SODIUM SERPL-SCNC: 139 MMOL/L (ref 136–145)

## 2017-12-21 PROCEDURE — 80053 COMPREHEN METABOLIC PANEL: CPT | Performed by: INTERNAL MEDICINE

## 2017-12-21 PROCEDURE — 74160 CT ABDOMEN W/CONTRAST: CPT

## 2017-12-21 PROCEDURE — 82948 REAGENT STRIP/BLOOD GLUCOSE: CPT

## 2017-12-21 RX ORDER — DIPHENHYDRAMINE HCL 25 MG
50 TABLET ORAL ONCE AS NEEDED
Status: COMPLETED | OUTPATIENT
Start: 2017-12-21 | End: 2017-12-21

## 2017-12-21 RX ORDER — FLUTICASONE PROPIONATE 50 MCG
1 SPRAY, SUSPENSION (ML) NASAL DAILY
Status: DISCONTINUED | OUTPATIENT
Start: 2017-12-21 | End: 2017-12-22 | Stop reason: HOSPADM

## 2017-12-21 RX ADMIN — FINASTERIDE 5 MG: 5 TABLET, FILM COATED ORAL at 08:40

## 2017-12-21 RX ADMIN — DIPHENHYDRAMINE HCL 50 MG: 25 TABLET ORAL at 14:42

## 2017-12-21 RX ADMIN — FLUTICASONE PROPIONATE 1 SPRAY: 50 SPRAY, METERED NASAL at 14:43

## 2017-12-21 RX ADMIN — PANTOPRAZOLE SODIUM 40 MG: 40 TABLET, DELAYED RELEASE ORAL at 05:08

## 2017-12-21 RX ADMIN — LEVOTHYROXINE SODIUM 50 MCG: 50 TABLET ORAL at 05:08

## 2017-12-21 RX ADMIN — LISINOPRIL 10 MG: 10 TABLET ORAL at 08:40

## 2017-12-21 RX ADMIN — Medication 250 MG: at 08:40

## 2017-12-21 RX ADMIN — Medication 250 MG: at 17:40

## 2017-12-21 RX ADMIN — LISINOPRIL 10 MG: 10 TABLET ORAL at 21:14

## 2017-12-21 RX ADMIN — INSULIN LISPRO 1 UNITS: 100 INJECTION, SOLUTION INTRAVENOUS; SUBCUTANEOUS at 21:17

## 2017-12-21 RX ADMIN — CEFTRIAXONE 2000 MG: 2 INJECTION, SOLUTION INTRAVENOUS at 14:43

## 2017-12-21 RX ADMIN — TAMSULOSIN HYDROCHLORIDE 0.4 MG: 0.4 CAPSULE ORAL at 17:40

## 2017-12-21 RX ADMIN — IODIXANOL 100 ML: 320 INJECTION, SOLUTION INTRAVASCULAR at 15:29

## 2017-12-21 RX ADMIN — PANTOPRAZOLE SODIUM 40 MG: 40 TABLET, DELAYED RELEASE ORAL at 17:41

## 2017-12-21 RX ADMIN — ONDANSETRON 4 MG: 4 TABLET, ORALLY DISINTEGRATING ORAL at 05:09

## 2017-12-21 NOTE — ASSESSMENT & PLAN NOTE
· Resolved   Has had similar episodes in the past after administration of anesthesia  · Voided freely this morning after discontinuation of Erwin  · Continue Flomax

## 2017-12-21 NOTE — CASE MANAGEMENT
Hello, refaxing clinical per request  Was initially faxed on 12/11/17  Dia Barrios RN Registered Nurse Addendum   Case Management Date of Service: 12/10/2017  2:07 PM         []Hide copied text  [address] [facility] in the [facility] by Arnold Ulrich for 2017  Network Utilization Review Department  Phone: [phone]; Fax [phone]  ATTENTION: The Network Utilization Review Department is now centralized for our 7 Facilities  Make a note that we have a new phone and fax numbers for our Department  Please call with any questions or concerns to [phone] and carefully follow the prompts so that you are directed to the right person  All voicemails are confidential  Fax any determinations, approvals, denials, and requests for initial or continue stay review clinical to [phone]  Due to HIGH CALL volume, it would be easier if you could please send faxed requests to expedite your requests and in part, help us provide discharge notifications faster      Initial Clinical Review     Admission: Date/Time/Statement: 12/10/17 @ 0145            Orders Placed This Encounter   Procedures    Inpatient Admission       Standing Status:   Standing       Number of Occurrences:   1       Order Specific Question:   Admitting Physician       Answer:   Tracee Santa [[id number]]       Order Specific Question:   Level of Care       Answer:   Med Surg [16]       Order Specific Question:   Estimated length of stay       Answer:   More than 2 Midnights       Order Specific Question:   Certification       Answer:   I certify that inpatient services are medically necessary for this patient for a duration of greater than two midnights   See H&P and MD Progress Notes for additional information about the patient's course of treatment             ED: Date/Time/Mode of Arrival:             ED Arrival Information      Expected Arrival Acuity Means of Arrival Escorted By Service Admission Type     - 12/9/2017 23:27 Urgent Ambulance [facility] General Medicine Urgent     Arrival Complaint     -             Chief Complaint:        Chief Complaint   Patient presents with    Fever - 9 weeks to 74 years       Per EMS " Family stated he became shaky and felt warm to touch  He was showing confusion  He had gallbladder removed three weeks ago  He has stents in place and is to be remove on the 12/22  " Healthy appearance  Answers questions appropriately         History of Illness: Eulogio Hunter is a 65 y  o  male who presents with complaints of fever and not feeling well that began yesterday   Patient states he has been very shaky   Patient states he has been taking Tylenol but the fever keeps returning   Patient is visiting from [place] for a family wedding  Genia Gimenez states that while at the wedding today he started to feel worse and was brought to the hospital via EMS   Patient status post cholecystectomy with stent placement on 10/25/17  Genia Gimenez is scheduled for stent removal on 12/20/2017  Genia Poles states he had an emergent cholecystectomy  Burt Poles states he does have abdominal pain but denies nausea, vomiting, chest pain, shortness of breath         ED Vital Signs:            ED Triage Vitals [12/09/17 2334]   Temperature Pulse Respirations Blood Pressure SpO2   99 2 °F (37 3 °C) (!) 118 20 110/57 98 %       Temp Source Heart Rate Source Patient Position - Orthostatic VS BP Location FiO2 (%)   Oral Monitor Sitting Right arm --       Pain Score           4                Wt Readings from Last 1 Encounters:   12/10/17 65 4 kg (144 lb 2 9 oz)         Vital Signs (abnormal):   12/10/17 0011    103 1 °F (39 5 °C)      12/10/17 1038    101 8 °F (38 8 °C)  126 18 105/50 91 %   12/10/17 0828    97 4 °F (36 3 °C) 104 20 139/68 92 %   12/10/17 0730   97 6 °F (36 4 °C) 77 18 100/60 97 %   12/10/17 0300   97 8 °F (36 6 °C) 81 18 109/55 97 %   12/10/17 0215   99 5 °F (37 5 °C) -- -- -- -- 12/10/17 0147   -- 91 20 97/55 95 %   12/10/17 0106   -- 101 20 107/56 96 %      Abnormal Labs/Diagnostic Test Results:   12/9: na 134   Cl 99   Bun 26   Creat 1 45   ast 64   Alb 3   t bili 1 2   Mg 1 5   Lactic acid 2 3  Wbc 22 62   hgb 11   hct 34   Bands 9   D dimer 1440     Pt 17   incr 1 34   ptt 41  bld c&s pending  EKG: sinus tach     12/10: VBG: vph 7 411   vpco2 37 3   vpo2 60 4   vhco3 23 2   voxyhgb 90 5  Na 134   Gluc 168, 146   Ca 7 8   Lactic acid 4 4  Wbc 20 93   hgb 10 1   hct 31  UA: sm bld   Tr prpot   1-2 rbc   occ epithelials  CXR: no active dz  CT a&p:1   Enhancing hepatic lesions as described above   Differential would include atypical hemangioma, hepatic neoplasm, either primary or metastatic or hepatic abscess, given the history of fever and recent gallbladder surgery   It is recommended the patient have a follow-up MRI of the liver with gadolinium, for further evaluation  2   Prostatomegaly with probable bladder outlet obstruction   Correlation with serum PSA    3   Colonic diverticulosi     MRI abd: 4 5 cm right hepatic lobe mass is indeterminate and restricts diffusion   Although abscess remains in the differential diagnosis, its imaging appearance slightly favor a solid mass   Needle sampling is necessary for further differentiation and therefore recommended    1 7 cm posterior right hepatic lobe lesion is difficult to characterize given its small size although has imaging features favoring cavernous hemangioma   I would recommend follow-up MRI in 3 months to ensure stability of this finding    1 7 x 1 2 cm indeterminate left adrenal nodule   This does not appear to represent a lipid rich adenoma although could represent a lipid poor adenoma   This can be reevaluated for stability at time of follow-up above      ED Treatment:              Medication Administration from 12/09/2017 2327 to 12/10/2017 0251        Date/Time Order Dose Route Action Action by Comments       12/10/2017 0008 sodium chloride 0 9 % bolus 1,000 mL 1,000 mL Intravenous New Bag Deo Johnson RN         12/10/2017 0034 acetaminophen (TYLENOL) tablet 650 mg 650 mg Oral Given Deo Johnson RN         12/10/2017 0034 ketorolac (TORADOL) injection 30 mg 30 mg Intravenous Given Deo Johnson RN         12/10/2017 0104 cefTRIAXone (ROCEPHIN) IVPB (premix) 1,000 mg 1,000 mg Intravenous New Bag Deo Johnson RN         12/10/2017 0145 vancomycin (VANCOCIN) IVPB (premix) 1,000 mg 1,000 mg Intravenous New Bag Deo Johnson RN               Past Medical/Surgical History: Active Ambulatory Problems     Diagnosis Date Noted    BPH (benign prostatic hyperplasia) 12/10/2017    Left upper quadrant abdominal abscess ([facility] ) 12/10/2017    Cholelithiasis with biliary obstruction S/P Cholecystectomy 6 weeks ago 12/10/2017    Lactic acid acidosis 12/10/2017    Anemia 12/10/2017    Leukocytosis with bandemia 12/10/2017           Resolved Ambulatory Problems     Diagnosis Date Noted    No Resolved Ambulatory Problems           Past Medical History:   Diagnosis Date    BPH (benign prostatic hyperplasia)      Diabetes mellitus (HCC)      Hypertension      Hypothyroidism           Admitting Diagnosis: Fever [R50 9]  Hepatic lesion [K76 9]  Sepsis ([facility] ) [A41 9]     Age/Sex: 65 y o  male     Assessment/Plan: Principal Problem:  Sepsis ([facility] )  Active Problems:    DM (diabetes mellitus) ([facility] )    HTN (hypertension)    ALDEN (acute kidney injury) (HCC)    Plan for the Primary Problem(s):  · Sepsis  ? Admit  ? Trend lactic acid  ? IV fluids  ? IV antibiotics, cefepime and vancomycin  ? Liver ultrasound pending  ? Consult GI  ?  Etiology unclear, but suspicious of possible hepatic abscess based on CT findings and recent cholecystectomy with stent placement      Plan for Additional Problems:   · Diabetes - hemoglobin A1c pending, insulin sliding scale  · Hypertension - will monitor, holding lisinopril secondary to acute kidney injury  · Acute kidney injury - IV fluids, recheck creatinine, avoid nephrotoxic drugs     VTE Prophylaxis: Heparin  / sequential compression device   Code Status:  Full  POLST: There is no POLST form on file for this patient (pre-hospital)     Anticipated Length of Stay: Judie  will be admitted on an  inpatient  basis with an anticipated length of stay of  more than 2 midnights    Justification for Hospital Stay:  IV antibiotics     Attestation signed by Sheridan Ac MD at 12/10/2017 11:17 AM      Split/Shared Statement  I saw/examined the patient  I agree with the Advanced Practitioner's note with the following additions/exceptions: Called to see patient with shaking chills   Patient awake and alert but with rigor  Fluids increased to 100 ml, and lactic acid sent stat, Blood cultures x 2 ordered  Lactic returned 4 4  Severe sepsis protocol activated with initiation of Fluid boluses and serial lactic acid   CT from last night showed potential for liver abcess which is highly likely since patient recently had cholecystectomy with common bile duct stent  He was due to have stent removed on return to [place]  I reviewed this case with ICU attending who agrees with increase in level of care  We have no ICU availablity on campus and patient will likely at minimum may need IR for intervention and drainage vs surgery    Patient will need transfer to [place]  Reviewed case with Dr Deonte Sorto who accepts patient in transfer to medical ICU  Stacey Talamantes get MRI stat prior to transfer   Tele started      Discussed ALDEN with Dr John Coil  Creatinine improved this am  Continue fluids    Ok to give gadolinium at this time         Exam: VS  101 8   Resp 18  105/50 91 % MAP 76     General : Rigor, bed shaking    Patient awake alert very pleasant  Normocephalic atraumatic pupils equal round and reactive to light extraocular muscles are intact mucous mucous membranes are moist neck is supple there is no JVD no lymph nodes no carotid bruits chest is decreased but clear to auscultation is no rhonchi rales or wheezes  Cardiovascular regular rate rhythm positive S1 and S2 no S3-S4 murmur or gallop  Abdomen soft nontender nondistended with positive bowel sounds no hepatosplenomegaly no guarding or rebound, he does not appear to have a full bladder  Extremities no clubbing cyanosis edema neurologically awake alert oriented cranial nerves 2-12 appear to be intact     Laboratories were reviewed patient with increased lactic acid the signifying worsening sepsis   Patient qualifies for severe sepsis and we transfer to the ICU at [place] for intervention   For now will give IV fluid boluses repeat lactic acid in Q 2 hours and obtain MRCP to guide therapy at our other facil            Admission Orders:     PO tylenol x 1  IV cefepime q12h  Peridex rinse q12h  SQ heparin q8h  Gluc checks ac/  Hs  IV toradol prn (x 1)  PO levothyroxine daily  PO phytonadione x 1  IV zosyn q6h  NS 1li bolus x 4  PO flomax daily  IV vanco x 2  /hr  IV fentanyl x 1  IV versed x 1  IV zofran prn  SCD's  Lactic acid q2h  Cons GI  Diabetic diet  Cbc, bmp in am  US liver  Up w/assist     PER MED DC SUMMARY 12/10  Subjective:  Patient found this morning to be shaking chills, +nausea  Discharge Diagnoses:    Principal Problem:  ·   Severe sepsis [facility]): patient with increasing lactic acid this am and rigor  CT shows possible liver abcess versus hemangioma   Patient being sent for MRI stat   Inpatient sepsis protocol initiated  I spoke with ID will change Cefepime to zosyn and keep Vanco for now  Melody Haywood has a retained common bile duct stent that he was to have removed on return to [address] [place] [address] being transferred to MICU in [place] for closer monitoring and possible drainage if this is an abcess    Significant Findings / Test Results:    · Possible liver abscess  · Discharging creatinine improved to 1   29 down from 1 45  · White count initially 22 62 down to 20 93  · Hemoglobin 10 1  · Lactic acid trending from 2 3-1 0, to 4 4 this a aleks Hunter is a 65 y  o  male patient who originally presented to the hospital on 12/9/2017 due to altered mental status and fever   Patient evidently had a cholecystectomy with complications requiring placement of common bile duct stent back in October 25  [facility] was to have the stent removed on December 20, 2017   Evidently the cholecystectomy was emergent   The patient came to [place] for a wedding and while here started to feel shaky and have fever  [facility] started to take Tylenol but did not have improvement in the fever   His wife states he was actually confused  [facility] came to the emergency room at the urging guests who were physicians at the wedding  [facility] was found to have sepsis related to a possible liver abscess noted on CT scan   This morning the patient developed poor eager stat lactic acid was sent showing worsening condition   I communicated with Infectious Disease and adjusted his antibiotics   I initiated severe sepsis protocol initiated fluid boluses   We reviewed the case with ICU  Alexandrea Daly will need to be transferred to [place] for further care due to no ICU beds and the likelihood of needing drainage through an interventional radiologist which would not be available here today  Ana Mcconnell the time of transfer the patient is awake alert and oriented rigors have continue discontinued   We are obtain MRI to help guide therapy  I have reviewed the case with Nephrology to assure that it would be safe to give him gadolinium at this time his renal function is reasonable   We will need to do a he retention protocol on him however as it does appear that he has prostatomegaly and may have urinary output put issues    Condition at [address] Transfer to [place]     Gastroenterology [address] [place] [address] - Clinical Indications for Admission to Inpatient Care by Zoila Bellamy RN          Met:  Reviewed on 12/11/2017 by Zoila Bellamy RN               Created Using Review Status Review Entered   Jaquelin Joe Completed 12/11/2017 11:29       Criteria Set Name - Subset   Gastroenterology [address] - Clinical Indications for Admission to Inpatient Care       Criteria Review       Clinical Indications for Admission to Inpatient Care   Most Recent : Griselda Bulls Most Recent Date: 12/11/2017 11:29 AM EST   (X) Hospital admission is needed for appropriate care of the patient because of 1 or more of the    following :      (X) Suspected acute intra-abdominal process indicated by 1 or more of the following  (1) (2) (3)       (4) (5):         (X) Hemodynamic instability         12/11/2017 11:29 AM EST by Griselda Bulls            after 4li NS infused; lactic acid 4 4          (X) Severe liver disease indicated by 1 or more of the following  (15) (16) (21) (22) (23) (24)       (25) (26) (27) (28):         (X) Hepatic abscess         ==================================================     PLEASE NOTE, THE BELOW REVIEW IS FROM THE [facility]) [address] [facility]   UNABLE TO REMOVE FROM THE FAX       ==================================================       Revision History

## 2017-12-21 NOTE — PROGRESS NOTES
Progress Note - Infectious Disease   Eulogio Hunter 65 y o  male MRN: [id number]  Unit/Bed#: -01 Encounter: [id number]      Impression/Plan:  1   Severe sepsis-POA at [facility], leukocytosis, elevated lactate   Suspect secondary to early hepatic infection  No other clear sources appreciated  Thus far the patient's blood cultures have remained negative and he has become hemodynamically stable and nontoxic   He seems to be tolerating the antibiotics without difficulty   The lactate level normalized   The fever has resolved   He was not bacteremic   -antibiotics as below  -monitor CBC with diff and CMP  -supportive care     2   Hypodense liver mass-Biopsy appears consistent with abscess   Cannot exclude adjacent malignancy   With the patient having no liver mass back in late October, it is unlikely that this represents of malignancy, however perhaps there is more than 1 process  This more likely represents an inflammatory/infectious process like a phlegmon that has yet to become liquified into an abscess  No other clear source is appreciated   The liver biopsy cultures are negative  The repeat MRI is once again inconclusive   -continue ceftriaxone for now  -await repeat CT of the liver to see if any drainable collection  -if no drainable collection, cefdinir 300 mg p o  q 12 hours through 12/23/2017 to complete 2 weeks of treatment  -follow up with GI in [place]     3   Recent cholecystitis/cholangitis-with biliary obstruction   Status post ERCP with stenting   Possibly all related to 1  Transaminases are elevated however there does not appear to be an obstructive pattern, and imaging does not reveal any ongoing obstruction  The liver function tests have significantly improved  The ERCP from yesterday when he and his stent removed did not show any more obstruction    -monitor liver function test  -no additional ID workup for now  -GI follow-up     4   Diabetes mellitus-type 2 with hyperglycemia    Prolonged discussion with the patient, his family, and the primary service  Antibiotics:  Ceftriaxone 10  Antibiotics 12    Subjective:  Patient has no fever, chills, sweats; no nausea, vomiting, diarrhea; no cough, shortness of breath; no pain  No new symptoms  He is in good spirits  Objective:  Vitals:  HR:  [91] 91  Resp:  [18] 18  BP: (146-168)/(71-79) 168/77  SpO2:  [95 %-98 %] 95 %  Temp (24hrs), Av 1 °F (36 7 °C), Min:97 6 °F (36 4 °C), Max:98 4 °F (36 9 °C)  Current: Temperature: 97 6 °F (36 4 °C)    Physical Exam:   General Appearance:  Alert, nontoxic, no acute distress  Throat: Oropharynx moist without lesions  Lungs:   Clear to auscultation bilaterally; respirations unlabored   Heart:  RRR; no murmur, rub or gallop   Abdomen:   Soft, non-tender, non-distended, positive bowel sounds  Extremities: No clubbing, cyanosis or edema   Skin: No new rashes or lesions  No draining wounds noted         Labs, Imaging, & Other studies:   All pertinent labs and imaging studies were personally reviewed    Results from last 7 days  Lab Units 17  0454 17  0607 17  0635   WBC Thousand/uL 11 67* 12 37* 10 17*   HEMOGLOBIN g/dL 11 1* 10 6* 10 3*   PLATELETS Thousands/uL 502* 411* 404*       Results from last 7 days  Lab Units 17  0601 17  0514 17  0458 12/15/17  0559   SODIUM mmol/L 139 137 138 138   POTASSIUM mmol/L 3 9 3 8 3 5 3 6   CHLORIDE mmol/L 105 102 102 104   CO2 mmol/L 28 31 30 27   ANION GAP mmol/L 6 4 6 7   BUN mg/dL 13 10 10 9   CREATININE mg/dL 0 92 0 82 0 84 0 84   EGFR ml/min/1 73sq m 79 85 84 84   GLUCOSE RANDOM mg/dL 121 120 116 119   CALCIUM mg/dL 9 0 8 4 8 7 8 8   AST U/L 12  --  26 33   ALT U/L 36  --  56 66   ALK PHOS U/L 114  --  118* 124*   TOTAL PROTEIN g/dL 6 7  --  6 4 6 6   ALBUMIN g/dL 2 5*  --  2 2* 2 2*   BILIRUBIN TOTAL mg/dL 0 39  --  0 43 0 46

## 2017-12-21 NOTE — PROGRESS NOTES
CT without drainable collection, but the hypodense region is still quite prominent  Recommend maintaining the patient on cefdinir 300 mg p o  q 12 hours until he is able to be seen by GI or infectious diseases in [place]  I suspect he will need additional imaging to confirm clearance of this process, and may eventually need drainage if this inflammatory process becomes liquified  Discussed in detail with the primary service

## 2017-12-21 NOTE — PROGRESS NOTES
Progress Note - Domenica Quinonez 1940, 65 y o  male MRN: [id number]    Unit/Bed#: -01 Encounter: [id number]    Primary Care Provider: No primary care provider on file  Date and time admitted to hospital: 12/10/2017  2:29 PM    * Severe sepsis [facility])   Assessment & Plan    · POA at [facility]  · Suspected secondary to hepatic infection  · Sepsis is resolved and patient continues on IV ceftriaxone for hepatic infection        Liver mass   Assessment & Plan    · Hypotensive liver mass status post biopsy which appeared consistent with abscess  Malignancy however could not be excluded although less likely  · Repeat MRI on 12/15/17 was inconclusive although most suggestive of abscess  · IR to evaluate for drainable collection  CT abdomen with contrast ordered  I had an extensive discussion with the patient, spouse and daughter at the bedside  He reports he has an iodine allergy  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   He did however receive Iodine contrast with last CT scan done on 12/10/17 without any prep or reaction  Following discussions with family members, patient has decided to proceed with current CT of the abdomen with contrast but requests Benadryl to be given prior to administration of contrast   Order placed for 50 mg Benadryl 1 hour prior to CT   · Holding off on repeat biopsy at this time as patient and family will prefer this to be done in [place] where he resides  · He will require a repeat MRI of the abdomen after completion of antibiotic course  This will likely be done in [place]  · Patient will be transitioned to oral antibiotics and discharged if no drainable collection identified after IR evaluation    Discussed with Dr Mayte Ayers            Cholelithiasis with biliary obstruction S/P Cholecystectomy 6 weeks ago   Assessment & Plan    · He is status post ERCP with removal of stent on 12/19/17  · Liver enzymes/bilirubin within normal limits        Type 2 diabetes mellitus with hyperglycemia (HCC)   Assessment & Plan    · Blood glucose acceptable  · Resume metformin on discharge  · Continue SSI         Anemia   Assessment & Plan    · HGB stable  · Fecal occult blood negative x1          HTN (hypertension)   Assessment & Plan    · Continue lisinopril        Duodenal ulcer   Assessment & Plan    · Small clean based duodenal ulcer noted on ERCP  · No evidence of bleeding  · H pylori and biopsies pending        Postprocedural urinary retention   Assessment & Plan    · Resolved  Has had similar episodes in the past after administration of anesthesia  · Voided freely this morning after discontinuation of Erwin  · Continue Flomax            Addendum 17:50pm - met with patient, spouse and daughter at the bedside and discussed with Dr nAder Yadav on the phone  They were updated on repeat CT results and IR evaluation  Plan will be for discharge tomorrow on oral antibiotics following which patient is to follow up within the next week with ID and liver specialist in [place]  He will remain on oral antibiotics until seen for continued management  They are aware of increase in size of liver mass on CT and need for continued urgent care in [place] with repeat imaging studies in a few days once discharged from the hospital     VTE Pharmacologic Prophylaxis:   Pharmacologic: Enoxaparin (Lovenox)  Mechanical VTE Prophylaxis in Place: Yes    Patient Centered Rounds: I have performed bedside rounds with nursing staff today  Discussions with Specialists or Other Care Team Provider:  Nursing/Infectious Disease/radiology    Education and Discussions with Family / Patient:  Patient, spouse and daughter at the bedside    All questions answered and concerns addressed    Current Length of Stay: 11 day(s)    Current Patient Status: Inpatient   Certification Statement: The patient will continue to require additional inpatient hospital stay due to Sepsis    Discharge Plan:  Anticipate next 24-48 hours if no drainable collection noted on IR evaluation    Code Status: Level 1 - Full Code      Subjective:   Patient without new complaints this morning  No chest pain or shortness of breath, no fever or chills, no nausea, vomiting or abdominal pain    Objective:     Vitals:   Temp (24hrs), Av 1 °F (36 7 °C), Min:97 6 °F (36 4 °C), Max:98 4 °F (36 9 °C)    HR:  [91] 91  Resp:  [18] 18  BP: (146-168)/(71-79) 168/77  SpO2:  [95 %-98 %] 95 %  Body mass index is 22 08 kg/m²  Input and Output Summary (last 24 hours):        Intake/Output Summary (Last 24 hours) at 17 1105  Last data filed at 17 8571   Gross per 24 hour   Intake              480 ml   Output             3200 ml   Net            -2720 ml       Physical Exam:  General Appearance:    Alert, cooperative, no distress, appropriately responsive    Head:    Normocephalic, without obvious abnormality, atraumatic, mucous membranes moist    Eyes:    Conjunctiva/corneas clear, EOM's intact   Neck:   Supple   Lungs:     Clear to auscultation bilaterally, respirations unlabored, no crackles or wheeze     Heart:    Regular rate and rhythm, S1 and S2    Abdomen:     Soft, non-tender, bowel sounds active all four quadrants,     no masses, no organomegaly   Extremities:   Extremities normal, atraumatic, no cyanosis or edema   Neurologic:  nonfocal         Additional Data:     Labs:      Results from last 7 days  Lab Units 17  0454   WBC Thousand/uL 11 67*   HEMOGLOBIN g/dL 11 1*   HEMATOCRIT % 34 3*   PLATELETS Thousands/uL 502*       Results from last 7 days  Lab Units 17  0601   SODIUM mmol/L 139   POTASSIUM mmol/L 3 9   CHLORIDE mmol/L 105   CO2 mmol/L 28   BUN mg/dL 13   CREATININE mg/dL 0 92   CALCIUM mg/dL 9 0   TOTAL PROTEIN g/dL 6 7   BILIRUBIN TOTAL mg/dL 0 39   ALK PHOS U/L 114   ALT U/L 36   AST U/L 12   GLUCOSE RANDOM mg/dL 121           * I Have Reviewed All Lab Data Listed Above   * Additional Pertinent Lab Tests Reviewed: All Labs Within Last 24 Hours Reviewed    Cultures:   Blood Culture:   Lab Results   Component Value Date    BLOODCX No Growth After 5 Days  12/10/2017    BLOODCX No Growth After 5 Days  12/10/2017    BLOODCX No Growth After 5 Days  12/09/2017    BLOODCX No Growth After 5 Days  12/09/2017     Urine Culture: No results found for: URINECX  Sputum Culture: No components found for: SPUTUMCX  Wound Culture: No results found for: WOUNDCULT    Last 24 Hours Medication List:     cefTRIAXone 2,000 mg Intravenous Q24H   enoxaparin 40 mg Subcutaneous Q24H [facility]   finasteride 5 mg Oral Daily   insulin lispro 1-5 Units Subcutaneous TID AC   insulin lispro 1-5 Units Subcutaneous HS   levothyroxine 50 mcg Oral Early Morning   lisinopril 10 mg Oral Q12H SILVINO   ondansetron 4 mg Oral TID AC   pantoprazole 40 mg Oral BID AC   saccharomyces boulardii 250 mg Oral BID   tamsulosin 0 4 mg Oral Daily With Dinner        Today, Patient Was Seen By: Noris Calvillo MD    ** Please Note: Dragon 360 Dictation voice to text software may have been used in the creation of this document   **

## 2017-12-21 NOTE — ASSESSMENT & PLAN NOTE
· Hypotensive liver mass status post biopsy which appeared consistent with abscess  Malignancy however could not be excluded although less likely  · Repeat MRI on 12/15/17 was inconclusive although most suggestive of abscess  · IR to evaluate for drainable collection  CT abdomen with contrast ordered  I had an extensive discussion with the patient, spouse and daughter at the bedside  He reports he has an iodine allergy  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   He did however receive Iodine contrast with last CT scan done on 12/10/17 without any prep or reaction  Following discussions with family members, patient has decided to proceed with current CT of the abdomen with contrast but requests Benadryl to be given prior to administration of contrast   Order placed for 50 mg Benadryl 1 hour prior to CT   · Holding off on repeat biopsy at this time as patient and family will prefer this to be done in [place] where he resides  · He will require a repeat MRI of the abdomen after completion of antibiotic course  This will likely be done in [place]  · Patient will be transitioned to oral antibiotics and discharged if no drainable collection identified after IR evaluation    Discussed with Dr Adis Felton

## 2017-12-22 VITALS
HEART RATE: 93 BPM | SYSTOLIC BLOOD PRESSURE: 143 MMHG | OXYGEN SATURATION: 99 % | RESPIRATION RATE: 18 BRPM | WEIGHT: 141.76 LBS | DIASTOLIC BLOOD PRESSURE: 67 MMHG | HEIGHT: 67 IN | TEMPERATURE: 97.7 F | BODY MASS INDEX: 22.25 KG/M2

## 2017-12-22 PROBLEM — R33.8 POSTPROCEDURAL URINARY RETENTION: Status: RESOLVED | Noted: 2017-12-20 | Resolved: 2017-12-22

## 2017-12-22 PROBLEM — N99.89 POSTPROCEDURAL URINARY RETENTION: Status: RESOLVED | Noted: 2017-12-20 | Resolved: 2017-12-22

## 2017-12-22 PROBLEM — A41.9 SEVERE SEPSIS (HCC): Status: RESOLVED | Noted: 2017-12-10 | Resolved: 2017-12-22

## 2017-12-22 PROBLEM — R65.20 SEVERE SEPSIS (HCC): Status: RESOLVED | Noted: 2017-12-10 | Resolved: 2017-12-22

## 2017-12-22 LAB
GLUCOSE SERPL-MCNC: 212 MG/DL (ref 65–140)
GLUCOSE SERPL-MCNC: 98 MG/DL (ref 65–140)

## 2017-12-22 PROCEDURE — 82948 REAGENT STRIP/BLOOD GLUCOSE: CPT

## 2017-12-22 RX ORDER — CEFDINIR 300 MG/1
300 CAPSULE ORAL EVERY 12 HOURS SCHEDULED
Status: DISCONTINUED | OUTPATIENT
Start: 2017-12-22 | End: 2017-12-22 | Stop reason: HOSPADM

## 2017-12-22 RX ORDER — SACCHAROMYCES BOULARDII 250 MG
250 CAPSULE ORAL 2 TIMES DAILY
Qty: 60 CAPSULE | Refills: 0 | Status: SHIPPED | OUTPATIENT
Start: 2017-12-22

## 2017-12-22 RX ORDER — FLUTICASONE PROPIONATE 50 MCG
1 SPRAY, SUSPENSION (ML) NASAL DAILY
Qty: 16 G | Refills: 0 | Status: SHIPPED | OUTPATIENT
Start: 2017-12-23

## 2017-12-22 RX ORDER — CEFDINIR 300 MG/1
300 CAPSULE ORAL EVERY 12 HOURS SCHEDULED
Qty: 60 CAPSULE | Refills: 0 | Status: SHIPPED | OUTPATIENT
Start: 2017-12-22 | End: 2018-01-21

## 2017-12-22 RX ORDER — PANTOPRAZOLE SODIUM 40 MG/1
40 TABLET, DELAYED RELEASE ORAL
Qty: 60 TABLET | Refills: 0 | Status: SHIPPED | OUTPATIENT
Start: 2017-12-22

## 2017-12-22 RX ADMIN — ONDANSETRON 4 MG: 4 TABLET, ORALLY DISINTEGRATING ORAL at 06:32

## 2017-12-22 RX ADMIN — INSULIN LISPRO 1 UNITS: 100 INJECTION, SOLUTION INTRAVENOUS; SUBCUTANEOUS at 12:12

## 2017-12-22 RX ADMIN — LISINOPRIL 10 MG: 10 TABLET ORAL at 09:13

## 2017-12-22 RX ADMIN — CEFDINIR 300 MG: 300 CAPSULE ORAL at 13:38

## 2017-12-22 RX ADMIN — FINASTERIDE 5 MG: 5 TABLET, FILM COATED ORAL at 09:13

## 2017-12-22 RX ADMIN — PANTOPRAZOLE SODIUM 40 MG: 40 TABLET, DELAYED RELEASE ORAL at 06:32

## 2017-12-22 RX ADMIN — Medication 250 MG: at 09:13

## 2017-12-22 RX ADMIN — LEVOTHYROXINE SODIUM 50 MCG: 50 TABLET ORAL at 06:28

## 2017-12-22 RX ADMIN — FLUTICASONE PROPIONATE 1 SPRAY: 50 SPRAY, METERED NASAL at 09:13

## 2017-12-22 NOTE — DISCHARGE SUMMARY
Discharge Summary - [facility]    Patient Information: Berta Barros 65 y o  male MRN: [id number]  Unit/Bed#: -01 Encounter: [id number]    Discharging Physician / Practitioner: Adalberto Saravia MD  PCP: No primary care provider on file  Admission Date: 12/10/2017  Discharge Date: 12/22/17    Reason for Admission:  Sepsis    Discharge Diagnoses:   · Severe sepsis - resolved  · Liver mass  · Cholelithiasis with biliary obstruction status post cholecystectomy 6 weeks prior  · Type 2 diabetes with hyperglycemia  · Anemia  · Essential hypertension  · Duodenal ulcer  · Postprocedural urinary retention - resolved    Consultations During Hospital Stay:  · Gastroenterology  · Infectious Disease    Procedures Performed:   · 12/10/17 - Successful CT-guided biopsy  Attempted drainage which was not possible as this is a solid lesion  · 12/19/17 ERCP - ERCP with stent removal  Non dialted CBD and intrahepatic ducts  No filling defect seen  Small clean base duodenal ulcer  Mild gastritis  · 12/10/17 -   Liver (core needle biopsy): - Liver parenchyma with areas of acute inflammation, necrosis and hemorrhage  No significant fibrosis    Significant findings:  · 12/10/17 CT abdomen/pelvis with IV contrast - 1  Enhancing hepatic lesions  Differential would include atypical hemangioma, hepatic neoplasm, either primary or metastatic or hepatic abscess, given the history of fever and recent gallbladder surgery  It is recommended the patient have a follow-up MRI of the liver with gadolinium, for further evaluation  Prostatomegaly with probable bladder outlet obstruction  Correlation with serum PSA  Colonic diverticulosis  · 12/10/17 MRI abdomen with and without contrast - 4 5 cm right hepatic lobe mass is indeterminate and restricts diffusion  Although abscess remains in the differential diagnosis, its imaging appearance slightly favor a solid mass    Needle sampling is necessary for further differentiation and therefore recommended  1 7 cm posterior right hepatic lobe lesion is difficult to characterize given its small size although has imaging features favoring cavernous hemangioma  I would recommend follow-up MRI in 3 months to ensure stability of this finding  1 7 x 1 2 cm indeterminate left adrenal nodule  This does not appear to represent a lipid rich adenoma although could represent a lipid poor adenoma  This can be reevaluated for stability at time of follow-up above  · 12/15/17 MRI abdomen/MRCP - 1   4 2 cm heterogeneously enhancing, lobulated lesion in segment VIII of the right hepatic lobe shows no significant change in size  The current study more conspicuously demonstrates areas of central enhancement that remain concerning for solid component  From an imaging standpoint, organizing abscess and metastatic lesion are still both differential considerations, though prior biopsy results are most suggestive of abscess  12 mm lesion in segment VII is most likely hemangioma though lack of discontinuity in early rim enhancement is atypical   Previous recommendation for 3 month follow-up is further supported  17 mm indeterminate left adrenal nodule is unchanged from prior  · 12/21/17 CT abdomen with IV contrast - Heterogeneously enhancing mass within the central aspect of the liver  On arterial phase imaging the mass appears larger than previous exams with heterogeneous peripheral enhancement measuring 7 5 x 5 9 cm on series 3 image 24  Peripherally this becomes somewhat isodense to liver parenchyma on portal venous and delayed imaging with heterogeneous enhancement intermixed with low density  This represents a significant change compared to CT scan dated 12/10/2017  Although biopsy showed infection, neoplastic process is not excluded  Stable smaller lesion within the posterior segment of the right lobe of the liver suggestive of hemangioma on previous MRI examination      Hospital Course:   Lam Ventura is a 65 y o  male patient who originally presented to the hospital on 12/10/2017 due to sepsis  Patient had initially presented to [facility] on 12/9/17 with altered mental status and fever  He recently had a cholecystectomy with complications requiring placement of a common bile duct stent back in October of 2017  He was planned to have the stent removed on 12/20/17  Cholecystectomy was said to have been emergent at that time  Patient had been in [place] for a wedding when symptoms began with chills and fever  On initial presentation, he was diagnosed with severe sepsis thought related to a possible liver abscess based on CT results  Was subsequently transferred to [facility] for higher level of care due to severe sepsis with suspected liver abscess and need for possible drainage  Had blood cultures drawn all of which were negative  He also had liver biopsy done which showed acute inflammation necrosis and hemorrhage with no definite evidence of malignancy  He was seen by infectious disease and treated with IV ceftriaxone which was subsequently transitioned to oral cefdinir at discharge  The patient has been instructed to continue with oral antibiotics until he follows with his gastroenterologist/infectious disease specialist in [place] at which point, further antibiotic dosing and duration will be as per their recommendations  Due to rapidly changing nature of radiologic findings on CT of the abdomen, patient is highly recommended to immediately follow up with his gastroenterologist upon return to [place]  The patient and the family were extensively counseled and understood need for continued care to ensure resolution of the liver infection/lesion  He was felt that the most likely source of infection was his recent emergent cholecystectomy with ERCP/stent placement  He did have removal of the stents prior to his discharge   Liver enzymes returned to normal, patient remained afebrile and was cleared for discharge on 12/22/17  He is currently scheduled to return to [place] on 12/23/17 and has an appointment with his gastroenterologist on 12/28/17  Condition at Discharge: stable     Discharge Day Visit / Exam:     Subjective:  No new complaints or overnight events  Denies fever, no nausea vomiting, he is voiding freely and tolerating a diabetic diet    Vitals: Blood Pressure: 143/67 (Map 97) (12/22/17 0710)  Pulse: 93 (12/22/17 0710)  Temperature: 97 7 °F (36 5 °C) (12/22/17 0710)  Temp Source: Oral (12/22/17 0710)  Respirations: 18 (12/22/17 0710)  Height: 5' 7" (170 2 cm) (12/19/17 1347)  Weight - Scale: 64 3 kg (141 lb 12 1 oz) (12/22/17 0600)  SpO2: 99 % (12/22/17 0710)    General Appearance:    Alert, cooperative, no distress, appropriately responsive    Head:    Normocephalic, without obvious abnormality, atraumatic, mucous membranes moist    Eyes:    Conjunctiva/corneas clear, EOM's intact   Neck:   Supple   Lungs:     Clear to auscultation bilaterally, respirations unlabored, no crackles or wheeze     Heart:    Regular rate and rhythm, S1 and S2    Abdomen:     Soft, non-tender, bowel sounds active all four quadrants,     no masses, no organomegaly   Extremities:   Extremities normal, atraumatic, no cyanosis or edema   Neurologic:  nonfocal      Discharge instructions/Information to patient and family:   See after visit summary for information provided to patient and family  Provisions for Follow-Up Care:  See after visit summary for information related to follow-up care and any pertinent home health orders  Disposition: Home    I discussed with patient, spouse and daughter at the bedside  All questions were answered and concerns addressed  Discharge Statement:  I spent >50 minutes discharging the patient  This time was spent on the day of discharge  I had direct contact with the patient on the day of discharge   Greater than 50% of the total time was spent examining patient, answering all patient questions, arranging and discussing plan of care with patient as well as directly providing post-discharge instructions  Additional time then spent on discharge activities  Discharge Medications:  See after visit summary for reconciled discharge medications provided to patient and family  ** Please Note: Dragon 360 Dictation voice to text software may have been used in the creation of this document   **

## 2017-12-22 NOTE — PROGRESS NOTES
Progress Note - Infectious Disease   Eulogio Hunter 65 y o  male MRN: [id number]  Unit/Bed#: -01 Encounter: [id number]      Impression/Plan:  1   Severe sepsis-POA at [facility], leukocytosis, elevated lactate   Suspect secondary to early hepatic infection  No other clear sources appreciated  Thus far the patient's blood cultures have remained negative and he has become hemodynamically stable and nontoxic   He seems to be tolerating the antibiotics without difficulty   The lactate level normalized   The fever has resolved   He was not bacteremic   -antibiotics as below  -monitor CBC with diff and CMP as needed  -supportive care     2   Hypodense liver mass-Biopsy appears consistent with abscess   Cannot exclude adjacent malignancy   With the patient having no liver mass back in late October, it is unlikely that this represents of malignancy, however perhaps there is more than 1 process  This more likely represents an inflammatory/infectious process like a phlegmon that has yet to become liquified into an abscess  No other clear source is appreciated   The liver biopsy cultures are negative  The repeat MRI is once again inconclusive  Repeat CT with contrast did not reveal any easily drainable collection   -discontinue ceftriaxone  -cefdinir 300 mg p o  q 12 hours until the patient is able to be seen by GI or infectious diseases and reassess on imaging  -he is to see GI on the 28th of December him will need repeat imaging to see if any drainable collection is forming and if the hypodense region is shrinking   -follow up with GI in [place]     3   Recent cholecystitis/cholangitis-with biliary obstruction   Status post ERCP with stenting   Possibly all related to 1   Transaminases are elevated however there does not appear to be an obstructive pattern, and imaging does not reveal any ongoing obstruction  The liver function tests have significantly improved   The ERCP from yesterday when he and his stent removed did not show any more obstruction  -monitor liver function test  -no additional ID workup for now  -GI follow-up     4   Diabetes mellitus-type 2 with hyperglycemia    Antibiotics:  Ceftriaxone 11  Antibiotics 13    Subjective:  Patient has no fever, chills, sweats; no nausea, vomiting, diarrhea; no cough, shortness of breath; no pain  No new symptoms  He is in good spirits and ambulating in the hallway  Objective:  Vitals:  HR:  [88-93] 93  Resp:  [18] 18  BP: (130-143)/(63-69) 143/67  SpO2:  [99 %-100 %] 99 %  Temp (24hrs), Av 6 °F (36 4 °C), Min:97 4 °F (36 3 °C), Max:97 7 °F (36 5 °C)  Current: Temperature: 97 7 °F (36 5 °C)    Physical Exam:   General Appearance:  Alert, nontoxic, no acute distress  Throat: Oropharynx moist without lesions  Lungs:   Clear to auscultation bilaterally; respirations unlabored   Heart:  RRR; no murmur, rub or gallop   Abdomen:   Soft, non-tender, non-distended, positive bowel sounds  Extremities: No clubbing, cyanosis or edema   Skin: No new rashes or lesions  No draining wounds noted         Labs, Imaging, & Other studies:   All pertinent labs and imaging studies were personally reviewed    Results from last 7 days  Lab Units 17  0454 17  0607 17  0635   WBC Thousand/uL 11 67* 12 37* 10 17*   HEMOGLOBIN g/dL 11 1* 10 6* 10 3*   PLATELETS Thousands/uL 502* 411* 404*       Results from last 7 days  Lab Units 17  0601 17  0514 17  0458   SODIUM mmol/L 139 137 138   POTASSIUM mmol/L 3 9 3 8 3 5   CHLORIDE mmol/L 105 102 102   CO2 mmol/L 28 31 30   ANION GAP mmol/L 6 4 6   BUN mg/dL 13 10 10   CREATININE mg/dL 0 92 0 82 0 84   EGFR ml/min/1 73sq m 79 85 84   GLUCOSE RANDOM mg/dL 121 120 116   CALCIUM mg/dL 9 0 8 4 8 7   AST U/L 12  --  26   ALT U/L 36  --  56   ALK PHOS U/L 114  --  118*   TOTAL PROTEIN g/dL 6 7  --  6 4   ALBUMIN g/dL 2 5*  --  2 2*   BILIRUBIN TOTAL mg/dL 0 39  --  0 43

## 2017-12-22 NOTE — DISCHARGE INSTRUCTIONS
Percutaneous Liver Biopsy   WHAT YOU NEED TO KNOW:   A PLB is a procedure to remove a sample of tissue from your liver  The sample can be sent to a lab and tested for liver disease, cancer, or infection  After the procedure you may have pain and bruising at the biopsy site  You may also have pain in your right shoulder  These symptoms should get better in 48 to 72 hours  DISCHARGE INSTRUCTIONS:     [facility] patients,  Contact Interventional Radiology at [phone] PATIENTS: Contact Interventional Radiology at [phone]   [facility] PATIENTS: Contact Interventional Radiology at [phone] if:    · Fever greater than 101 or chills  · You have severe pain in your abdomen  · Your abdomen is larger than usual and feels hard  · Your neck is more swollen and you have trouble swallowing  · You feel weak or dizzy  · Your heart is beating faster than usual    · Your pain does not get better after you take pain medicine  · Your wound is red, swollen, or draining pus  · You have nausea or are vomiting  · Your skin is itchy, swollen, or you have a rash  · You have questions or concerns about your condition or care  Medicines:   · Acetaminophen decreases pain and fever  It is available without a doctor's order  Acetaminophen can cause liver damage if not taken correctly  · Take your home medicine as directed  · Resume your normal diet  Small sips of flat soda will help with mild nausea  Self-care:   · Rest as directed  Do not play sports, exercise, or lift anything heavier than 5 pounds for up to 1 week  · Apply firm, steady pressure if bleeding occurs  A small amount of bleeding from your wound is possible  Apply pressure with a clean gauze or towel for 5 to 10 minutes  Call 911 if bleeding becomes heavy or does not stop  · Ask your healthcare provider when to take your blood thinner or antiplatelet medicine   You may need to wait 24 to 72 hours to take your medicine  This will prevent bleeding  Follow up with your healthcare provider as directed: Write down your questions so you remember to ask them during your visits  You will need to continue on the prescribed antibiotic Cefdinir until otherwise recommended by your gastroenterologist or infectious disease specialist in [place]  You will need serial CT scans of the abdomen to follow up on liver mass and possibly a repeat liver biopsy if so directed by your gastroenterologist in [place]

## 2017-12-25 ENCOUNTER — GENERIC CONVERSION - ENCOUNTER (OUTPATIENT)
Dept: GASTROENTEROLOGY | Facility: CLINIC | Age: 77
End: 2017-12-25

## 2017-12-26 NOTE — CASE MANAGEMENT
Renetta Tavera MD Physician Signed Internal Medicine Discharge Summaries Date of Service: 12/22/2017 11:14 AM      Discharge Summary - [facility] Internal Medicine     Patient Information: Deja Hernandez 65 y o  male MRN: [id number]  Unit/Bed#: -01 Encounter: [id number]     Discharging Physician / Practitioner: Renetta Tavera MD  PCP: No primary care provider on file  Admission Date: 12/10/2017  Discharge Date: 12/22/17     Reason for Admission:  Sepsis     Discharge Diagnoses:   · Severe sepsis - resolved  · Liver mass  · Cholelithiasis with biliary obstruction status post cholecystectomy 6 weeks prior  · Type 2 diabetes with hyperglycemia  · Anemia  · Essential hypertension  · Duodenal ulcer  · Postprocedural urinary retention - resolved     Consultations During Hospital Stay:  · Gastroenterology  · Infectious Disease     Procedures Performed:   · 12/10/17 - Successful CT-guided biopsy  Attempted drainage which was not possible as this is a solid lesion  · 12/19/17 ERCP - ERCP with stent removal  Non dialted CBD and intrahepatic ducts  No filling defect seen  Small clean base duodenal ulcer  Mild gastritis  · 12/10/17 -   Liver (core needle biopsy): - Liver parenchyma with areas of acute inflammation, necrosis and hemorrhage  No significant fibrosis     Significant findings:  · 12/10/17 CT abdomen/pelvis with IV contrast - 1   Enhancing hepatic lesions   Differential would include atypical hemangioma, hepatic neoplasm, either primary or metastatic or hepatic abscess, given the history of fever and recent gallbladder surgery   It is recommended the patient have a follow-up MRI of the liver with gadolinium, for further evaluation  Prostatomegaly with probable bladder outlet obstruction   Correlation with serum PSA   Colonic diverticulosis      · 12/10/17 MRI abdomen with and without contrast - 4 5 cm right hepatic lobe mass is indeterminate and restricts diffusion   Although abscess remains in the differential diagnosis, its imaging appearance slightly favor a solid mass   Needle sampling is necessary for further differentiation and therefore recommended  1 7 cm posterior right hepatic lobe lesion is difficult to characterize given its small size although has imaging features favoring cavernous hemangioma   I would recommend follow-up MRI in 3 months to ensure stability of this finding  1 7 x 1 2 cm indeterminate left adrenal nodule   This does not appear to represent a lipid rich adenoma although could represent a lipid poor adenoma   This can be reevaluated for stability at time of follow-up above      · 12/15/17 MRI abdomen/MRCP - 1   4 2 cm heterogeneously enhancing, lobulated lesion in segment VIII of the right hepatic lobe shows no significant change in size   The current study more conspicuously demonstrates areas of central enhancement that remain concerning for solid component   From an imaging standpoint, organizing abscess and metastatic lesion are still both differential considerations, though prior biopsy results are most suggestive of abscess  12 mm lesion in segment VII is most likely hemangioma though lack of discontinuity in early rim enhancement is atypical   Previous recommendation for 3 month follow-up is further supported  17 mm indeterminate left adrenal nodule is unchanged from prior      · 12/21/17 CT abdomen with IV contrast - Heterogeneously enhancing mass within the central aspect of the liver   On arterial phase imaging the mass appears larger than previous exams with heterogeneous peripheral enhancement measuring 7 5 x 5 9 cm on series 3 image 24   Peripherally this becomes somewhat isodense to liver parenchyma on portal venous and delayed imaging with heterogeneous enhancement intermixed with low density   This represents a significant change compared to CT scan dated 12/10/2017   Although biopsy showed infection, neoplastic process is not excluded   Stable smaller lesion within the posterior segment of the right lobe of the liver suggestive of hemangioma on previous MRI examination      Hospital Course:   Teresa Bolton is a 65 y o  male patient who originally presented to the hospital on 12/10/2017 due to sepsis  Patient had initially presented to [facility] on 12/9/17 with altered mental status and fever  He recently had a cholecystectomy with complications requiring placement of a common bile duct stent back in October of 2017  He was planned to have the stent removed on 12/20/17  Cholecystectomy was said to have been emergent at that time  Patient had been in [place] for a wedding when symptoms began with chills and fever  On initial presentation, he was diagnosed with severe sepsis thought related to a possible liver abscess based on CT results  Was subsequently transferred to [facility] for higher level of care due to severe sepsis with suspected liver abscess and need for possible drainage  Had blood cultures drawn all of which were negative  He also had liver biopsy done which showed acute inflammation necrosis and hemorrhage with no definite evidence of malignancy  He was seen by infectious disease and treated with IV ceftriaxone which was subsequently transitioned to oral cefdinir at discharge  The patient has been instructed to continue with oral antibiotics until he follows with his gastroenterologist/infectious disease specialist in [place] at which point, further antibiotic dosing and duration will be as per their recommendations  Due to rapidly changing nature of radiologic findings on CT of the abdomen, patient is highly recommended to immediately follow up with his gastroenterologist upon return to [place]  The patient and the family were extensively counseled and understood need for continued care to ensure resolution of the liver infection/lesion    He was felt that the most likely source of infection was his recent emergent cholecystectomy with ERCP/stent placement  He did have removal of the stents prior to his discharge  Liver enzymes returned to normal, patient remained afebrile and was cleared for discharge on 12/22/17  He is currently scheduled to return to [place] on 12/23/17 and has an appointment with his gastroenterologist on 12/28/17      Condition at Discharge: stable      Discharge Day Visit / Exam:      Subjective:  No new complaints or overnight events  Denies fever, no nausea vomiting, he is voiding freely and tolerating a diabetic diet     Vitals: Blood Pressure: 143/67 (Map 97) (12/22/17 0710)  Pulse: 93 (12/22/17 0710)  Temperature: 97 7 °F (36 5 °C) (12/22/17 0710)  Temp Source: Oral (12/22/17 0710)  Respirations: 18 (12/22/17 0710)  Height: 5' 7" (170 2 cm) (12/19/17 1347)  Weight - Scale: 64 3 kg (141 lb 12 1 oz) (12/22/17 0600)  SpO2: 99 % (12/22/17 0710)     General Appearance:    Alert, cooperative, no distress, appropriately responsive    Head:    Normocephalic, without obvious abnormality, atraumatic, mucous membranes moist    Eyes:    Conjunctiva/corneas clear, EOM's intact   Neck:   Supple   Lungs:     Clear to auscultation bilaterally, respirations unlabored, no crackles or wheeze     Heart:    Regular rate and rhythm, S1 and S2    Abdomen:     Soft, non-tender, bowel sounds active all four quadrants,     no masses, no organomegaly   Extremities:   Extremities normal, atraumatic, no cyanosis or edema   Neurologic:  nonfocal      Discharge instructions/Information to patient and family:   See after visit summary for information provided to patient and family        Provisions for Follow-Up Care:  See after visit summary for information related to follow-up care and any pertinent home health orders        Disposition: Home     I discussed with patient, spouse and daughter at the bedside    All questions were answered and concerns addressed      Discharge Statement:  I spent >50 minutes discharging the patient  This time was spent on the day of discharge  I had direct contact with the patient on the day of discharge  Greater than 50% of the total time was spent examining patient, answering all patient questions, arranging and discussing plan of care with patient as well as directly providing post-discharge instructions  Additional time then spent on discharge activities      Discharge Medications:  See after visit summary for reconciled discharge medications provided to patient and family        ** Please Note: Dragon 360 Dictation voice to text software may have been used in the creation of this document  **

## 2018-01-02 ENCOUNTER — OFFICE (OUTPATIENT)
Dept: URBAN - METROPOLITAN AREA CLINIC 67 | Facility: CLINIC | Age: 78
End: 2018-01-02

## 2018-01-02 VITALS
DIASTOLIC BLOOD PRESSURE: 74 MMHG | HEIGHT: 66 IN | HEART RATE: 96 BPM | SYSTOLIC BLOOD PRESSURE: 129 MMHG | WEIGHT: 136 LBS

## 2018-01-02 DIAGNOSIS — R17 JAUNDICE: ICD-10-CM

## 2018-01-02 DIAGNOSIS — K75.0 ABSCESS OF LIVER: ICD-10-CM

## 2018-01-02 DIAGNOSIS — K80.10 CALCULUS OF GALLBLADDER WITH CHOLECYSTITIS: ICD-10-CM

## 2018-01-02 DIAGNOSIS — K83.1 MIRIZZI'S SYNDROME: ICD-10-CM

## 2018-01-02 PROCEDURE — 99215 OFFICE O/P EST HI 40 MIN: CPT | Performed by: INTERNAL MEDICINE

## 2018-01-02 NOTE — SERVICEHPINOTES
patient previously seen with following notes:BR11/9/17: patient was seen in hospital with abdominal pain, cholecystitis jaundice due Mirrizi syndrome. S/p ERCP with stent placement and bilirubin came down from 5.5. 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 One 10 Fr by 9 cm plastic stent with a single external flap and a single internal flap was placed into the common bile duct into the common hepatic duct. Bile flowed through the stent. The stent was in good position.s/p lap cholecystectomy 10/27/17 per Dr. villavicencio.BRDIAGNOSIS:BRGALLBLADDER, LAPAROSCOPIC CHOLECYSTECTOMY:BR- ACUTE AND CHRONIC CHOLECYSTITIS WITH MUCOSAL ULCERATION AND NECROSIS.BR- CHOLELITHIASIS.    BR...BRMRI / MRCP 10/2017: There is 11 mm hemangioma identified in the right posterior liver.  Was told to schedule repeat ERCP with stent removal.1/2/18: Went to [facility], had fever, white count elevation and rigors. Admitted (records reviewed).Liver mass noted.BRCT 12/10/17: 14 mm enhancing lesion right lobe of liver. 4 cm lesion in the dome of the right lobe. BRIR drainage 12/10/17: Attempted drainage was not possible as this is a solid lesion.BRMRI 12/15/17: 4.2 cm lobulated lesion in segment VIII of the right hepatic lobe. Differential: abseess vs. [id number] mm lesion segment 7 lesion suggestive of hemangioma. 17 mm adrenal nodule. 3 month follow up recommended.BRBlood cultures were negative for growth.Treated with IV ceftriaxone and improved. ID consult recommended maintaining cefdinir 300 mg po Q12 hours until seen in [place], recommended  "I suspect he will need additional imaging to confirm clearance of this process, and may eventually need drainage if this inflammatory process becomes liquified."  Repeat ERCP 12/19/17: CBD stent removed. NO filling defect/ biliary tree normal. Small clean-based ulcer in duodenal bulb. Normal creatinine and lfts on 12/21/17 upon discharge.Now denies any more symptoms. Has appointment with ID physician  Also passed black stool once when was on heparin/ denies aspirin/ NSAIDs currently. Told to take protonix 40 mg daily. Says previously on baby aspirin for prophylaxis due to risk factors including diabetes.   BR

## 2021-02-25 NOTE — PROGRESS NOTES
CHIEF COMPLAINT  Desires Fraxel laser treatment.     HISTORY OF PRESENT ILLNESS  Jennie Fields presents to undergo Fraxel laser treatment  Face/ neck- 2nd treatment. 1550 treatment for laxity, wrinkles  $500/treatment      PHYSICAL EXAM  Examination revealed moderate skin laxity, fine rhytides     PLAN  I reviewed non-ablative fractional photothermolysis (Fraxel laser) as an appropriate treatment.      Discussed risks, goals, alternatives, advance directives, and the necessity of other members of the healthcare team participating in the procedure with the patient.  The patient understands and wishes to proceed with the procedure.     Procedural pause conducted to verify:  Correct patient identity, procedure to be performed and as applicable, correct side and site, correct patient position, and availability of implants, special equipment or special requirements.     The procedure was performed today.  Return for continued treatment as directed.     OPERATIVE NOTE  PREOPERATIVE DIAGNOSIS:  Laxity, rhytides     INDICATION FOR PROCEDURE:  Treatment     POSTOPERATIVE DIAGNOSIS:  same     PROCEDURE PERFORMED:  Nonablative fractional photothermolysis     DESCRIPTION OF PROCEDURE:   Prior to the procedure, final verification of the patient identity and correct marked surgical site was performed.     The anesthesia used was EMLA cream applied topically for one hour prior to the procedure and cool air.  The skin was prepped with alcohol.  Eye protection was placed.  The Fraxel laser was used at 1550 nm, treatment level 6, fluence of 60 mJ, 8 passes to treat the face/neck.         BLOOD LOSS:    None.       COMPLICATIONS:    None.       WOUND CARE:    Routine.      Progress Note - Dia Hazel 65 y o  male MRN: [id number]    Unit/Bed#: -01 Encounter: [id number]    Subjective:     Patient seen and examined  Wife present at bedside along with daughter and family friend over the phone  He denies abdominal pain  His stools are less dark than before  He is eating well  Objective:     Vitals: Blood pressure 157/74, pulse 94, temperature 97 8 °F (36 6 °C), temperature source Oral, resp  rate 18, height 5' 7" (1 702 m), weight 64 2 kg (141 lb 8 6 oz), SpO2 97 %  ,Body mass index is 22 17 kg/m²  Intake/Output Summary (Last 24 hours) at 12/18/17 1510  Last data filed at 12/18/17 1208   Gross per 24 hour   Intake             1360 ml   Output             2200 ml   Net             -840 ml       Physical Exam:     General Appearance: Alert male, well appearing, appears stated age and cooperative  Lungs: Clear to auscultation bilaterally, no rales or rhonchi, no labored breathing/accessory muscle use  Heart: Regular rate and rhythm, S1, S2 normal, no murmur, click, rub or gallop  Abdomen: Non-distended  Normal bowel sounds  Soft  Mild epigastric and RUQ tenderness to palpation  No rebound or guarding  Extremities: No cyanosis, edema    Invasive Devices     Peripheral Intravenous Line            Peripheral IV 12/18/17 Left Forearm less than 1 day                Lab Results:    Results from last 7 days  Lab Units 12/18/17  0635  12/14/17  0555   WBC Thousand/uL 10 17*  < > 13 12*   HEMOGLOBIN g/dL 10 3*  < > 11 1*   HEMATOCRIT % 31 4*  < > 32 6*   PLATELETS Thousands/uL 404*  < > 236   NEUTROS PCT %  --   --  77*   LYMPHS PCT %  --   --  12*   MONOS PCT %  --   --  10   EOS PCT %  --   --  1   < > = values in this interval not displayed      Results from last 7 days  Lab Units 12/17/17  0514 12/16/17  0458   SODIUM mmol/L 137 138   POTASSIUM mmol/L 3 8 3 5   CHLORIDE mmol/L 102 102   CO2 mmol/L 31 30   BUN mg/dL 10 10   CREATININE mg/dL 0 82 0 84   CALCIUM mg/dL 8 4 8 7 TOTAL PROTEIN g/dL  --  6 4   BILIRUBIN TOTAL mg/dL  --  0 43   ALK PHOS U/L  --  118*   ALT U/L  --  56   AST U/L  --  26   GLUCOSE RANDOM mg/dL 120 116               Imaging Studies: I have personally reviewed pertinent imaging studies  Xr Chest 2 Views    Result Date: 12/10/2017  Impression: No active pulmonary disease  Workstation performed: [id number]     Mri Abdomen W Wo Contrast    Result Date: 12/10/2017  Impression: 4 5 cm right hepatic lobe mass is indeterminate and restricts diffusion  Although abscess remains in the differential diagnosis, its imaging appearance slightly favor a solid mass  Needle sampling is necessary for further differentiation and therefore recommended  1 7 cm posterior right hepatic lobe lesion is difficult to characterize given its small size although has imaging features favoring cavernous hemangioma  I would recommend follow-up MRI in 3 months to ensure stability of this finding  1 7 x 1 2 cm indeterminate left adrenal nodule  This does not appear to represent a lipid rich adenoma although could represent a lipid poor adenoma  This can be reevaluated for stability at time of follow-up above  I personally discussed this study and recommendations with Dr Breann Hammond on 12/10/2017 1:06 PM  Workstation performed: [id number]     Mri Abdomen W Wo Contrast And Mrcp    Result Date: 12/15/2017  Impression: 1   4 2 cm heterogeneously enhancing, lobulated lesion in segment VIII of the right hepatic lobe shows no significant change in size  The current study more conspicuously demonstrates areas of central enhancement that remain concerning for solid component  From an imaging standpoint, organizing abscess and metastatic lesion are still both differential considerations, though prior biopsy results are most suggestive of abscess   2   12 mm lesion in segment VII is most likely hemangioma though lack of discontinuity in early rim enhancement is atypical   Previous recommendation for 3 month follow-up is further supported  3   17 mm indeterminate left adrenal nodule is unchanged from prior  Workstation performed: [id number]     Ct Needle Biopsy Liver    Result Date: 12/11/2017  Impression: Impression: Successful CT-guided biopsy  Attempted drainage which was not possible as this is a solid lesion  Workstation performed: [id number]     Ct Abdomen Pelvis With Contrast    Result Date: 12/10/2017  Impression: 1  Enhancing hepatic lesions as described above  Differential would include atypical hemangioma, hepatic neoplasm, either primary or metastatic or hepatic abscess, given the history of fever and recent gallbladder surgery  It is recommended the patient have a follow-up MRI of the liver with gadolinium, for further evaluation  2   Prostatomegaly with probable bladder outlet obstruction  Correlation with serum PSA  3   Colonic diverticulosis  Findings are consistent with the preliminary report from [employer] which was provided shortly after completion of the exam              Workstation performed: [id number]       Assessment and Plan:    1) Hypodense liver lesion: Likely liver abscess secondary to recent ERCP  Biopsy negative for malignancy  Repeat MRI showed stable 4 2 cm hypodense lesion cannot rule out underlying solid mass  Leukocytosis resolved  No fevers  He appears non-toxic  On exam, he has mild RUQ tenderness to palpation     -Continue IV antibiotics as per ID  -Repeat imaging in the next few weeks    2) Choledocholithiasis: status post ERCP with stent placement on 10/25/17  Called GI office in [place] to obtain official ERCP report  The biliary tree was unremarkable except for narrowing of the common hepatic duct suggesting partial obstruction by extrinsic pressure   A 10 Fr by 9 cm plastic stent was placed into the CBD into the common hepatic duct with good drainage      -Plan for ERCP tomorrow with stent removal  -NPO after midnight    3) Melena: Patient reports stools are less dark now  Hemoglobin stable at 10 3  Differential diagnosis includes erosive esophagitis, gastritis, PUD, AVM, less likely mass as upper GI tract appeared normal on recent endoscopy       -Plan for upper endoscopy tomorrow   -NPO after midnight  -Continue Protonix BID

## (undated) DEVICE — RETRIEVAL BALLOON CATHETER: Brand: EXTRACTOR™ PRO RX

## (undated) DEVICE — SPHINCTEROTOME: Brand: DREAMTOME™ RX 44